# Patient Record
Sex: MALE | Race: WHITE | NOT HISPANIC OR LATINO | Employment: OTHER | URBAN - METROPOLITAN AREA
[De-identification: names, ages, dates, MRNs, and addresses within clinical notes are randomized per-mention and may not be internally consistent; named-entity substitution may affect disease eponyms.]

---

## 2024-09-04 ENCOUNTER — APPOINTMENT (OUTPATIENT)
Dept: CT IMAGING | Facility: CLINIC | Age: 69
DRG: 100 | End: 2024-09-04
Attending: EMERGENCY MEDICINE
Payer: MEDICARE

## 2024-09-04 ENCOUNTER — HOSPITAL ENCOUNTER (INPATIENT)
Facility: CLINIC | Age: 69
LOS: 12 days | Discharge: HOME | DRG: 100 | End: 2024-09-18
Attending: EMERGENCY MEDICINE | Admitting: HOSPITALIST
Payer: MEDICARE

## 2024-09-04 DIAGNOSIS — R41.0 CONFUSION: ICD-10-CM

## 2024-09-04 DIAGNOSIS — R41.82 ALTERED MENTAL STATUS, UNSPECIFIED ALTERED MENTAL STATUS TYPE: ICD-10-CM

## 2024-09-04 DIAGNOSIS — R56.9 SEIZURE (H): ICD-10-CM

## 2024-09-04 DIAGNOSIS — R46.2 BIZARRE BEHAVIOR: ICD-10-CM

## 2024-09-04 DIAGNOSIS — R41.9 NEUROCOGNITIVE DISORDER: Primary | Chronic | ICD-10-CM

## 2024-09-04 LAB
ALBUMIN SERPL BCG-MCNC: 4.4 G/DL (ref 3.5–5.2)
ALBUMIN UR-MCNC: NEGATIVE MG/DL
ALP SERPL-CCNC: 69 U/L (ref 40–150)
ALT SERPL W P-5'-P-CCNC: 23 U/L (ref 0–70)
ANION GAP SERPL CALCULATED.3IONS-SCNC: 13 MMOL/L (ref 7–15)
APPEARANCE UR: CLEAR
AST SERPL W P-5'-P-CCNC: 39 U/L (ref 0–45)
BASOPHILS # BLD AUTO: 0 10E3/UL (ref 0–0.2)
BASOPHILS NFR BLD AUTO: 0 %
BILIRUB SERPL-MCNC: 0.9 MG/DL
BILIRUB UR QL STRIP: NEGATIVE
BUN SERPL-MCNC: 10.9 MG/DL (ref 8–23)
CALCIUM SERPL-MCNC: 9.1 MG/DL (ref 8.8–10.4)
CHLORIDE SERPL-SCNC: 100 MMOL/L (ref 98–107)
COLOR UR AUTO: ABNORMAL
CREAT SERPL-MCNC: 0.9 MG/DL (ref 0.67–1.17)
EGFRCR SERPLBLD CKD-EPI 2021: >90 ML/MIN/1.73M2
EOSINOPHIL # BLD AUTO: 0 10E3/UL (ref 0–0.7)
EOSINOPHIL NFR BLD AUTO: 0 %
ERYTHROCYTE [DISTWIDTH] IN BLOOD BY AUTOMATED COUNT: 12.2 % (ref 10–15)
ETHANOL SERPL-MCNC: <0.01 G/DL
GLUCOSE SERPL-MCNC: 161 MG/DL (ref 70–99)
GLUCOSE UR STRIP-MCNC: NEGATIVE MG/DL
HCO3 SERPL-SCNC: 20 MMOL/L (ref 22–29)
HCT VFR BLD AUTO: 38.8 % (ref 40–53)
HGB BLD-MCNC: 13.9 G/DL (ref 13.3–17.7)
HGB UR QL STRIP: ABNORMAL
HOLD SPECIMEN: NORMAL
IMM GRANULOCYTES # BLD: 0.1 10E3/UL
IMM GRANULOCYTES NFR BLD: 1 %
KETONES UR STRIP-MCNC: ABNORMAL MG/DL
LEUKOCYTE ESTERASE UR QL STRIP: NEGATIVE
LYMPHOCYTES # BLD AUTO: 1.6 10E3/UL (ref 0.8–5.3)
LYMPHOCYTES NFR BLD AUTO: 21 %
MAGNESIUM SERPL-MCNC: 2.3 MG/DL (ref 1.7–2.3)
MCH RBC QN AUTO: 33.6 PG (ref 26.5–33)
MCHC RBC AUTO-ENTMCNC: 35.8 G/DL (ref 31.5–36.5)
MCV RBC AUTO: 94 FL (ref 78–100)
MONOCYTES # BLD AUTO: 0.4 10E3/UL (ref 0–1.3)
MONOCYTES NFR BLD AUTO: 6 %
NEUTROPHILS # BLD AUTO: 5.3 10E3/UL (ref 1.6–8.3)
NEUTROPHILS NFR BLD AUTO: 72 %
NITRATE UR QL: NEGATIVE
NRBC # BLD AUTO: 0 10E3/UL
NRBC BLD AUTO-RTO: 0 /100
PH UR STRIP: 6.5 [PH] (ref 5–7)
PLATELET # BLD AUTO: 137 10E3/UL (ref 150–450)
POTASSIUM SERPL-SCNC: 3.6 MMOL/L (ref 3.4–5.3)
PROT SERPL-MCNC: 7.4 G/DL (ref 6.4–8.3)
RBC # BLD AUTO: 4.14 10E6/UL (ref 4.4–5.9)
RBC URINE: 2 /HPF
SODIUM SERPL-SCNC: 133 MMOL/L (ref 135–145)
SP GR UR STRIP: 1.02 (ref 1–1.03)
UROBILINOGEN UR STRIP-MCNC: NORMAL MG/DL
WBC # BLD AUTO: 7.3 10E3/UL (ref 4–11)
WBC URINE: 1 /HPF

## 2024-09-04 PROCEDURE — 96360 HYDRATION IV INFUSION INIT: CPT

## 2024-09-04 PROCEDURE — 85025 COMPLETE CBC W/AUTO DIFF WBC: CPT | Performed by: EMERGENCY MEDICINE

## 2024-09-04 PROCEDURE — 80053 COMPREHEN METABOLIC PANEL: CPT | Performed by: EMERGENCY MEDICINE

## 2024-09-04 PROCEDURE — 36415 COLL VENOUS BLD VENIPUNCTURE: CPT | Performed by: EMERGENCY MEDICINE

## 2024-09-04 PROCEDURE — 82077 ASSAY SPEC XCP UR&BREATH IA: CPT | Performed by: EMERGENCY MEDICINE

## 2024-09-04 PROCEDURE — 250N000009 HC RX 250: Performed by: EMERGENCY MEDICINE

## 2024-09-04 PROCEDURE — 80307 DRUG TEST PRSMV CHEM ANLYZR: CPT | Performed by: EMERGENCY MEDICINE

## 2024-09-04 PROCEDURE — 81001 URINALYSIS AUTO W/SCOPE: CPT | Performed by: EMERGENCY MEDICINE

## 2024-09-04 PROCEDURE — 84443 ASSAY THYROID STIM HORMONE: CPT | Performed by: HOSPITALIST

## 2024-09-04 PROCEDURE — 83735 ASSAY OF MAGNESIUM: CPT | Performed by: EMERGENCY MEDICINE

## 2024-09-04 PROCEDURE — 99285 EMERGENCY DEPT VISIT HI MDM: CPT | Mod: 25

## 2024-09-04 PROCEDURE — 82607 VITAMIN B-12: CPT | Performed by: HOSPITALIST

## 2024-09-04 PROCEDURE — 258N000003 HC RX IP 258 OP 636: Performed by: EMERGENCY MEDICINE

## 2024-09-04 PROCEDURE — 70496 CT ANGIOGRAPHY HEAD: CPT | Mod: MA

## 2024-09-04 PROCEDURE — 70450 CT HEAD/BRAIN W/O DYE: CPT | Mod: MA

## 2024-09-04 PROCEDURE — 96361 HYDRATE IV INFUSION ADD-ON: CPT

## 2024-09-04 PROCEDURE — 250N000011 HC RX IP 250 OP 636: Performed by: EMERGENCY MEDICINE

## 2024-09-04 RX ORDER — IOPAMIDOL 755 MG/ML
67 INJECTION, SOLUTION INTRAVASCULAR ONCE
Status: COMPLETED | OUTPATIENT
Start: 2024-09-04 | End: 2024-09-04

## 2024-09-04 RX ADMIN — SODIUM CHLORIDE 1000 ML: 9 INJECTION, SOLUTION INTRAVENOUS at 21:24

## 2024-09-04 RX ADMIN — IOPAMIDOL 67 ML: 755 INJECTION, SOLUTION INTRAVENOUS at 22:06

## 2024-09-04 RX ADMIN — SODIUM CHLORIDE 100 ML: 9 INJECTION, SOLUTION INTRAVENOUS at 22:06

## 2024-09-04 ASSESSMENT — COLUMBIA-SUICIDE SEVERITY RATING SCALE - C-SSRS
2. HAVE YOU ACTUALLY HAD ANY THOUGHTS OF KILLING YOURSELF IN THE PAST MONTH?: NO
6. HAVE YOU EVER DONE ANYTHING, STARTED TO DO ANYTHING, OR PREPARED TO DO ANYTHING TO END YOUR LIFE?: NO
1. IN THE PAST MONTH, HAVE YOU WISHED YOU WERE DEAD OR WISHED YOU COULD GO TO SLEEP AND NOT WAKE UP?: NO

## 2024-09-04 ASSESSMENT — ACTIVITIES OF DAILY LIVING (ADL)
ADLS_ACUITY_SCORE: 35

## 2024-09-04 NOTE — LETTER
[facility] NEUROSCIENCE UNIT  [address] [place] [address]  Phone: [phone]    September 18, 2024      Ivan Webb  [facility] [place] [id number]      To Whom It May Concern:    Ivan Webb is under our care and was admitted to our hospital from 9/4/24 to 9/18/24.  I anticipate that he will be able to go take a flight back to [place] after discharging from Hospital.  I hope this information is sufficient for your needs.  If you have any additional questions regarding this matter please contact our office.  Thank you.      Sincerely,            Estiven Randall MD  Internal Medicine/ Hospitalist

## 2024-09-05 ENCOUNTER — APPOINTMENT (OUTPATIENT)
Dept: GENERAL RADIOLOGY | Facility: CLINIC | Age: 69
DRG: 100 | End: 2024-09-05
Attending: INTERNAL MEDICINE
Payer: MEDICARE

## 2024-09-05 ENCOUNTER — APPOINTMENT (OUTPATIENT)
Dept: MRI IMAGING | Facility: CLINIC | Age: 69
DRG: 100 | End: 2024-09-05
Attending: EMERGENCY MEDICINE
Payer: MEDICARE

## 2024-09-05 ENCOUNTER — HOSPITAL ENCOUNTER (OUTPATIENT)
Dept: NEUROLOGY | Facility: CLINIC | Age: 69
Setting detail: OBSERVATION
Discharge: HOME | DRG: 100 | End: 2024-09-05
Attending: HOSPITALIST
Payer: MEDICARE

## 2024-09-05 ENCOUNTER — HOSPITAL ENCOUNTER (OUTPATIENT)
Dept: NEUROLOGY | Facility: CLINIC | Age: 69
Setting detail: OBSERVATION
Discharge: HOME | DRG: 100 | End: 2024-09-05
Attending: PSYCHIATRY & NEUROLOGY
Payer: MEDICARE

## 2024-09-05 PROBLEM — R41.82 ALTERED MENTAL STATUS, UNSPECIFIED ALTERED MENTAL STATUS TYPE: Status: ACTIVE | Noted: 2024-09-05

## 2024-09-05 PROBLEM — R46.2 BIZARRE BEHAVIOR: Status: ACTIVE | Noted: 2024-09-05

## 2024-09-05 PROBLEM — R56.9 SEIZURE (H): Status: ACTIVE | Noted: 2024-09-05

## 2024-09-05 PROBLEM — R41.0 CONFUSION: Status: ACTIVE | Noted: 2024-09-05

## 2024-09-05 LAB
AMMONIA PLAS-SCNC: 13 UMOL/L (ref 16–60)
AMPHETAMINES UR QL SCN: NORMAL
ANION GAP SERPL CALCULATED.3IONS-SCNC: 11 MMOL/L (ref 7–15)
BARBITURATES UR QL SCN: NORMAL
BASOPHILS # BLD AUTO: 0 10E3/UL (ref 0–0.2)
BASOPHILS NFR BLD AUTO: 0 %
BENZODIAZ UR QL SCN: NORMAL
BUN SERPL-MCNC: 9.3 MG/DL (ref 8–23)
BZE UR QL SCN: NORMAL
CALCIUM SERPL-MCNC: 8.7 MG/DL (ref 8.8–10.4)
CANNABINOIDS UR QL SCN: NORMAL
CHLORIDE SERPL-SCNC: 100 MMOL/L (ref 98–107)
CREAT SERPL-MCNC: 0.77 MG/DL (ref 0.67–1.17)
EGFRCR SERPLBLD CKD-EPI 2021: >90 ML/MIN/1.73M2
EOSINOPHIL # BLD AUTO: 0 10E3/UL (ref 0–0.7)
EOSINOPHIL NFR BLD AUTO: 0 %
ERYTHROCYTE [DISTWIDTH] IN BLOOD BY AUTOMATED COUNT: 12.2 % (ref 10–15)
FENTANYL UR QL: NORMAL
GLUCOSE SERPL-MCNC: 107 MG/DL (ref 70–99)
HCO3 SERPL-SCNC: 25 MMOL/L (ref 22–29)
HCT VFR BLD AUTO: 38.1 % (ref 40–53)
HGB BLD-MCNC: 13.7 G/DL (ref 13.3–17.7)
IMM GRANULOCYTES # BLD: 0 10E3/UL
IMM GRANULOCYTES NFR BLD: 0 %
LYMPHOCYTES # BLD AUTO: 1.8 10E3/UL (ref 0.8–5.3)
LYMPHOCYTES NFR BLD AUTO: 25 %
MCH RBC QN AUTO: 33.7 PG (ref 26.5–33)
MCHC RBC AUTO-ENTMCNC: 36 G/DL (ref 31.5–36.5)
MCV RBC AUTO: 94 FL (ref 78–100)
MONOCYTES # BLD AUTO: 0.5 10E3/UL (ref 0–1.3)
MONOCYTES NFR BLD AUTO: 7 %
NEUTROPHILS # BLD AUTO: 4.8 10E3/UL (ref 1.6–8.3)
NEUTROPHILS NFR BLD AUTO: 67 %
NRBC # BLD AUTO: 0 10E3/UL
NRBC BLD AUTO-RTO: 0 /100
OPIATES UR QL SCN: NORMAL
PCP QUAL URINE (ROCHE): NORMAL
PLATELET # BLD AUTO: 118 10E3/UL (ref 150–450)
POTASSIUM SERPL-SCNC: 3.7 MMOL/L (ref 3.4–5.3)
RBC # BLD AUTO: 4.06 10E6/UL (ref 4.4–5.9)
SARS-COV-2 RNA RESP QL NAA+PROBE: POSITIVE
SODIUM SERPL-SCNC: 136 MMOL/L (ref 135–145)
TSH SERPL DL<=0.005 MIU/L-ACNC: 2.38 UIU/ML (ref 0.3–4.2)
VIT B12 SERPL-MCNC: 808 PG/ML (ref 232–1245)
WBC # BLD AUTO: 7.2 10E3/UL (ref 4–11)

## 2024-09-05 PROCEDURE — 250N000013 HC RX MED GY IP 250 OP 250 PS 637: Performed by: INTERNAL MEDICINE

## 2024-09-05 PROCEDURE — 99418 PROLNG IP/OBS E/M EA 15 MIN: CPT | Performed by: HOSPITALIST

## 2024-09-05 PROCEDURE — 250N000011 HC RX IP 250 OP 636: Performed by: PSYCHIATRY & NEUROLOGY

## 2024-09-05 PROCEDURE — 72080 X-RAY EXAM THORACOLMB 2/> VW: CPT

## 2024-09-05 PROCEDURE — 85048 AUTOMATED LEUKOCYTE COUNT: CPT | Performed by: HOSPITALIST

## 2024-09-05 PROCEDURE — 96374 THER/PROPH/DIAG INJ IV PUSH: CPT

## 2024-09-05 PROCEDURE — 999N000052 EEG VIDEO 12-26 HR UNMONITORED

## 2024-09-05 PROCEDURE — 36415 COLL VENOUS BLD VENIPUNCTURE: CPT | Performed by: HOSPITALIST

## 2024-09-05 PROCEDURE — 87635 SARS-COV-2 COVID-19 AMP PRB: CPT | Performed by: HOSPITALIST

## 2024-09-05 PROCEDURE — 95816 EEG AWAKE AND DROWSY: CPT

## 2024-09-05 PROCEDURE — 258N000003 HC RX IP 258 OP 636: Performed by: PSYCHIATRY & NEUROLOGY

## 2024-09-05 PROCEDURE — 70551 MRI BRAIN STEM W/O DYE: CPT | Mod: MA

## 2024-09-05 PROCEDURE — G0378 HOSPITAL OBSERVATION PER HR: HCPCS

## 2024-09-05 PROCEDURE — 82140 ASSAY OF AMMONIA: CPT | Performed by: EMERGENCY MEDICINE

## 2024-09-05 PROCEDURE — 99223 1ST HOSP IP/OBS HIGH 75: CPT | Mod: AI | Performed by: HOSPITALIST

## 2024-09-05 PROCEDURE — 250N000013 HC RX MED GY IP 250 OP 250 PS 637: Performed by: PSYCHIATRY & NEUROLOGY

## 2024-09-05 PROCEDURE — 250N000013 HC RX MED GY IP 250 OP 250 PS 637: Performed by: HOSPITALIST

## 2024-09-05 PROCEDURE — 36415 COLL VENOUS BLD VENIPUNCTURE: CPT | Performed by: EMERGENCY MEDICINE

## 2024-09-05 PROCEDURE — 80048 BASIC METABOLIC PNL TOTAL CA: CPT | Performed by: HOSPITALIST

## 2024-09-05 PROCEDURE — 99207 PR APP CREDIT; MD BILLING SHARED VISIT: CPT | Performed by: INTERNAL MEDICINE

## 2024-09-05 RX ORDER — LORAZEPAM 2 MG/ML
2 INJECTION INTRAMUSCULAR
Status: DISCONTINUED | OUTPATIENT
Start: 2024-09-05 | End: 2024-09-18 | Stop reason: HOSPADM

## 2024-09-05 RX ORDER — AMOXICILLIN 250 MG
2 CAPSULE ORAL 2 TIMES DAILY PRN
Status: DISCONTINUED | OUTPATIENT
Start: 2024-09-05 | End: 2024-09-18 | Stop reason: HOSPADM

## 2024-09-05 RX ORDER — BUPROPION HYDROCHLORIDE 150 MG/1
150 TABLET ORAL EVERY MORNING
Status: DISCONTINUED | OUTPATIENT
Start: 2024-09-05 | End: 2024-09-16

## 2024-09-05 RX ORDER — FOLIC ACID/MV,IRON,MIN/LUTEIN 0.4-18-25
1 TABLET ORAL DAILY
Status: ON HOLD | COMMUNITY
End: 2024-09-17

## 2024-09-05 RX ORDER — LEVETIRACETAM 750 MG/1
750 TABLET ORAL 2 TIMES DAILY
Status: DISCONTINUED | OUTPATIENT
Start: 2024-09-05 | End: 2024-09-06

## 2024-09-05 RX ORDER — FOLIC ACID 1 MG/1
1 TABLET ORAL DAILY
Status: DISCONTINUED | OUTPATIENT
Start: 2024-09-05 | End: 2024-09-18 | Stop reason: HOSPADM

## 2024-09-05 RX ORDER — FOLIC ACID 1 MG/1
1 TABLET ORAL DAILY
Status: ON HOLD | COMMUNITY
End: 2024-09-17

## 2024-09-05 RX ORDER — LIDOCAINE 4 G/G
2 PATCH TOPICAL
Status: DISCONTINUED | OUTPATIENT
Start: 2024-09-05 | End: 2024-09-18 | Stop reason: HOSPADM

## 2024-09-05 RX ORDER — ATORVASTATIN CALCIUM 80 MG/1
80 TABLET, FILM COATED ORAL DAILY
COMMUNITY

## 2024-09-05 RX ORDER — MAGNESIUM OXIDE 400 MG/1
800 TABLET ORAL ONCE
Status: COMPLETED | OUTPATIENT
Start: 2024-09-05 | End: 2024-09-05

## 2024-09-05 RX ORDER — LANOLIN ALCOHOL/MO/W.PET/CERES
100 CREAM (GRAM) TOPICAL DAILY
Status: ON HOLD | COMMUNITY
End: 2024-09-17

## 2024-09-05 RX ORDER — MULTIPLE VITAMINS W/ MINERALS TAB 9MG-400MCG
1 TAB ORAL DAILY
Status: DISCONTINUED | OUTPATIENT
Start: 2024-09-05 | End: 2024-09-18 | Stop reason: HOSPADM

## 2024-09-05 RX ORDER — MULTIVITAMIN,THERAPEUTIC
1 TABLET ORAL ONCE
Status: COMPLETED | OUTPATIENT
Start: 2024-09-05 | End: 2024-09-05

## 2024-09-05 RX ORDER — AMOXICILLIN 250 MG
1 CAPSULE ORAL 2 TIMES DAILY PRN
Status: DISCONTINUED | OUTPATIENT
Start: 2024-09-05 | End: 2024-09-18 | Stop reason: HOSPADM

## 2024-09-05 RX ORDER — ONDANSETRON 2 MG/ML
4 INJECTION INTRAMUSCULAR; INTRAVENOUS EVERY 6 HOURS PRN
Status: DISCONTINUED | OUTPATIENT
Start: 2024-09-05 | End: 2024-09-18 | Stop reason: HOSPADM

## 2024-09-05 RX ORDER — FOLIC ACID 1 MG/1
1 TABLET ORAL ONCE
Status: COMPLETED | OUTPATIENT
Start: 2024-09-05 | End: 2024-09-05

## 2024-09-05 RX ORDER — BUPROPION HYDROCHLORIDE 150 MG/1
150 TABLET ORAL EVERY MORNING
Status: ON HOLD | COMMUNITY
End: 2024-09-17

## 2024-09-05 RX ORDER — ACETAMINOPHEN 650 MG/1
650 SUPPOSITORY RECTAL EVERY 4 HOURS PRN
Status: DISCONTINUED | OUTPATIENT
Start: 2024-09-05 | End: 2024-09-18 | Stop reason: HOSPADM

## 2024-09-05 RX ORDER — ONDANSETRON 4 MG/1
4 TABLET, ORALLY DISINTEGRATING ORAL EVERY 6 HOURS PRN
Status: DISCONTINUED | OUTPATIENT
Start: 2024-09-05 | End: 2024-09-18 | Stop reason: HOSPADM

## 2024-09-05 RX ORDER — ACETAMINOPHEN 325 MG/1
650 TABLET ORAL EVERY 4 HOURS PRN
Status: DISCONTINUED | OUTPATIENT
Start: 2024-09-05 | End: 2024-09-18 | Stop reason: HOSPADM

## 2024-09-05 RX ORDER — LANOLIN ALCOHOL/MO/W.PET/CERES
3 CREAM (GRAM) TOPICAL
Status: DISCONTINUED | OUTPATIENT
Start: 2024-09-05 | End: 2024-09-18 | Stop reason: HOSPADM

## 2024-09-05 RX ORDER — ATORVASTATIN CALCIUM 80 MG/1
80 TABLET, FILM COATED ORAL DAILY
Status: DISCONTINUED | OUTPATIENT
Start: 2024-09-05 | End: 2024-09-18 | Stop reason: HOSPADM

## 2024-09-05 RX ADMIN — THIAMINE HCL TAB 100 MG 100 MG: 100 TAB at 03:03

## 2024-09-05 RX ADMIN — FOLIC ACID 1 MG: 1 TABLET ORAL at 03:03

## 2024-09-05 RX ADMIN — BENZOCAINE 6 MG-MENTHOL 10 MG LOZENGES 1 LOZENGE: at 03:16

## 2024-09-05 RX ADMIN — FOLIC ACID 1 MG: 1 TABLET ORAL at 15:59

## 2024-09-05 RX ADMIN — LIDOCAINE 2 PATCH: 560 PATCH PERCUTANEOUS; TOPICAL; TRANSDERMAL at 20:49

## 2024-09-05 RX ADMIN — LEVETIRACETAM 750 MG: 750 TABLET, FILM COATED ORAL at 20:48

## 2024-09-05 RX ADMIN — Medication 800 MG: at 03:03

## 2024-09-05 RX ADMIN — MULTIVITAMIN TABLET 1 TABLET: TABLET at 03:03

## 2024-09-05 RX ADMIN — LEVETIRACETAM 2000 MG: 100 INJECTION, SOLUTION INTRAVENOUS at 19:02

## 2024-09-05 RX ADMIN — ATORVASTATIN CALCIUM 80 MG: 80 TABLET, FILM COATED ORAL at 15:59

## 2024-09-05 RX ADMIN — THIAMINE HCL TAB 100 MG 100 MG: 100 TAB at 15:59

## 2024-09-05 RX ADMIN — ACETAMINOPHEN 650 MG: 325 TABLET ORAL at 09:57

## 2024-09-05 RX ADMIN — Medication 1 TABLET: at 15:59

## 2024-09-05 ASSESSMENT — ACTIVITIES OF DAILY LIVING (ADL)
ADLS_ACUITY_SCORE: 35
ADLS_ACUITY_SCORE: 45
ADLS_ACUITY_SCORE: 45
ADLS_ACUITY_SCORE: 35
ADLS_ACUITY_SCORE: 45
ADLS_ACUITY_SCORE: 35
ADLS_ACUITY_SCORE: 45
ADLS_ACUITY_SCORE: 44
ADLS_ACUITY_SCORE: 35
ADLS_ACUITY_SCORE: 45
ADLS_ACUITY_SCORE: 44
ADLS_ACUITY_SCORE: 35
ADLS_ACUITY_SCORE: 45
ADLS_ACUITY_SCORE: 35
ADLS_ACUITY_SCORE: 45
ADLS_ACUITY_SCORE: 35
ADLS_ACUITY_SCORE: 45
ADLS_ACUITY_SCORE: 35

## 2024-09-05 NOTE — PROGRESS NOTES
RECEIVING UNIT ED HANDOFF REVIEW    ED Nurse Handoff Report was reviewed by: Nikolay Cohen RN on September 5, 2024 at 9:13 AM

## 2024-09-05 NOTE — PROGRESS NOTES
Patient admitted early hours of this morning.  See excellent H&P by Dr. Gore    Patient seen and examined along with neurologist  History reviewed    Better but intermittent confusion as per wife    Recently had a long 3300 mile trip to [place] which he remembers but complains of low back pain  Mild cough since Saturday      Breathing comfortable  No tachypnea  Vital stable, no use of O2    Labs and imaging reviewed    A/P: Continue management as noted in H&P  -Discussed with neurologist and will repeat EEG  -Continue to treat toxic metabolic causes  - HOLD Bupropion as lowers seizure threshold    15-minute prolonged care

## 2024-09-05 NOTE — PROGRESS NOTES
Preliminary review of long-term EEG recording.      EEG started this afternoon around 3:30 PM.  Normal for the first 1-1/2 hours.  Around 4:50 PM, patient started developing rhythmic bifrontal sharp wave discharges with centralization around the right F4/C4 electrodes.  These quickly spread to bifrontal regions and likely represent right more than left bilateral periodic lateralized epileptiform discharges.  Could represent an epileptiform phenomena in the right clinical setting.  This activity goes on till about 5:20 PM and normalizes thereafter until about 538 when this note was written.      I called Dr. Shar Feliz, neurologist on-call to give him this report.  Will review a longer-term recording later tonight.    Isauro Jaime MD   Neurologist, [facility]   September 5, 2024 5:39 PM

## 2024-09-05 NOTE — H&P
[facility]    History and Physical  Hospitalist       Date of Admission:  9/4/2024  Date of Service (when I saw the patient): 09/05/24    ASSESSMENT  Ivan Webb is a markedly pleasant 69 year old gentleman with past medical history that is most notable for prior seizure, who presents with acute confusion and convulsive syncope. and is found to have suspected acute seizure and acute metabolic encephalopathy.    PLAN     Suspected acute seizure and acute metabolic encephalopathy: Of note, Mr. Webb is travelling today back to his home in [place]. He recently quit smoking several weeks ago. It seems that he was hospitalized there in 2020 for acute confusion and bizarre behavior. It was documented that he was drinking 4-6 alcoholic beverages per day at that time. While hospitalized he had a seizure. Alcohol withdrawal and/or wernicke korsakoff syndrome were suspected. No further seizures have been known to have occurred before or since.    Now, he presents for evaluation of acute confusion today, culminating in a witnessed episode of convulsive syncope at the airport. In the ED, he is afebrile, without hypotension, tachycardia, or hypoxia. WBC is normal, as is hepatic panel. He has mild acute metabolic acidosis and thrombocytopenia as discussed below. UA as well as UDS are negative and Ethyl alcohol level is undetectable. CT and CTA of head and neck show no acute processes.     Overall, his symptoms seem consistent with a seizure. He could have alcohol withdrawal or a post-viral process. We will rule out stroke or structural abnormalities of the brain. His ongoing encephalopathy could be due to post-ictal confusion. Alternatively he could have as yet undiagnosed dementia, perhaps Wernicke encephalopathy. Epilepsy also remains on the differential.       -- Observation. Fall and seizure precautions. Q 4 neuro checks. MRI Brain and EEG ordered. Neurology consulted.    -- TSH, B12 and  Ammonia level ordered. COVID PCR ordered    -- Monitor closely for signs of withdrawal. Thiamine, folate and MVI ordered    -- Repeat CBC and BMP in AM. SW consulted for disposition planning.    Acute metabolic acidosis: Suspect due to hypovolemia: Given IV fluid in the ED. Repeat BMP in AM    Thrombocytopenia, acute: Mild. Had been normal on 6/17/24. Could be due to acute illness. Repeat CBC in AM.    Hyperlipidemia: Resume home Lipitor when verified    Chronic depression: On Wellbutrin which can lower the seizure threshold. Neurology to assess further today prior to resumption    History of prostate cancer: Status post prostactectomy. Noted    I have spent 75 minutes on the date of service doing chart review, history, examination, documentation, and further activities per the note.    Chief Complaint   Confusion    History is obtained from the patient, his wife at the bedside, and the ED physician whom I have spoken with    History of Present Illness   Ivan Webb is a markedly pleasant 69 year old gentleman who presents with confusion. His wife is with him and notes that they are from [place], and have recently been on a bus tour of the [facility] (neither one of them have been driving). This morning when they woke up at the hotel they were at, she noted him to be very acutely confused, doing things such as putting his bags outside the elevator door and leaving them there, not aware of where he was. They got to the airport here to fly back and his confusion worsened, especially as they were trying to get through security. At one point he tossed his 's license up in the air. Then as they were in line to board the plane, he fell backwards onto his backpack, thereby avoiding striking his head; he developed loss of consciousness and had full body convulsions for a period of several minutes, at least as far as can be ascertained by EMS and his wife. When he came to, he was even more confused. He  "was brought in for further evaluation. He is currently disoriented to place and time and has trouble finding words to say at times. He appears easily distracted. He is unable to recall fully the events of the day. His wife says that they both have had recent cold symptoms with fever and head congestion which have improved. When I ask about ETOH use, she says \"he didn't have that much today\", perhaps two drinks today, with lunch and while at the airport. When I ask how much he normally drinks, he says \"a lot. And that's the end of the story\"; his wife then regards him with a quizzical look. He has some mild pain in the middle of the back after his fall today. He otherwise denies headache, nausea, diarrhea, dysuria, dyspnea, or other acute complaints.    In the ED,   09/04 2100 125/88 98.3  F (36.8  C) 82 18 95 %     CBC and CMP were notable for , Na 133, CO2 20, Glucose 161, otherwise were within the normal reference range. WBC was 7.3. UA shows 1 WBC, 2 RBC. Ethyl alcohol level and UDS were negative.    He was given IV fluid in the ED.    Recent Results (from the past 24 hour(s))   CT Head w/o Contrast    Narrative    EXAM: CT HEAD W/O CONTRAST  LOCATION: [facility]  DATE: 9/4/2024    INDICATION: seizure, fall, confusion  COMPARISON: None.  TECHNIQUE: Routine CT Head without IV contrast. Multiplanar reformats. Dose reduction techniques were used.    FINDINGS:  INTRACRANIAL CONTENTS: No intracranial hemorrhage, extraaxial collection, or mass effect.  No CT evidence of acute infarct. Mild presumed chronic small vessel ischemic changes. Mild generalized volume loss. No hydrocephalus.     VISUALIZED ORBITS/SINUSES/MASTOIDS: No intraorbital abnormality. Minimal amount of fluid left maxillary sinus. No middle ear or mastoid effusion.    BONES/SOFT TISSUES: No acute abnormality.      Impression    IMPRESSION:  1.  No acute intracranial process.   CTA Head Neck w Contrast    Narrative    " EXAM: CTA HEAD NECK W CONTRAST  LOCATION: [facility]  DATE: 9/4/2024    INDICATION: seizure, fall, confusion  COMPARISON: None.  CONTRAST: 67mL Isovue 370  TECHNIQUE: Head and neck CT angiogram with IV contrast. Axial helical CT images of the head and neck vessels obtained during the arterial phase of intravenous contrast administration. Axial 2D reconstructed images and multiplanar 3D MIP reconstructed   images of the head and neck vessels were performed by the technologist. Dose reduction techniques were used. All stenosis measurements made according to NASCET criteria unless otherwise specified.    FINDINGS:   HEAD CTA:  ANTERIOR CIRCULATION: Mild atherosclerotic changes cavernous and supraclinoid ICAs bilaterally. Standard [place] anatomy.    POSTERIOR CIRCULATION: No stenosis/occlusion, aneurysm, or high flow vascular malformation. Dominant left and smaller right vertebral artery contribute to a normal basilar artery.     DURAL VENOUS SINUSES: Expected enhancement of the major dural venous sinuses.    NECK CTA:  RIGHT CAROTID: Less than 50% narrowing.    LEFT CAROTID: Less than 50% narrowing.    VERTEBRAL ARTERIES: Mild narrowing at the origin of both vertebral arteries. Both vertebral arteries appear otherwise normal. Dominant left and smaller right vertebral arteries.    AORTIC ARCH: Moderate narrowing at origin/proximal aspect of both subclavian arteries.    NONVASCULAR STRUCTURES: Mild compression of T3 and moderate compression of T4, age indeterminate; appear chronic.      Impression    IMPRESSION:     HEAD CTA:   1.  Mild atherosclerotic changes cavernous and supraclinoid ICAs bilaterally.  2.  Intracranial circulation appears otherwise normal.    NECK CTA:  1.  No definite hemodynamically significant narrowing throughout major neck vessels.         PHYSICAL EXAM  Blood pressure 125/88, pulse 82, temperature 98.3  F (36.8  C), temperature source Oral, resp. rate 18,  "height 1.676 m (5' 6\"), weight 72.6 kg (160 lb), SpO2 95%.  Constitutional: Alert and oriented to person only; no apparent distress  Respiratory: lungs clear to auscultation bilaterally  Cardiovascular: regular S1 S2  GI: abdomen soft non tender non distended bowel sounds positive  Musculoskeletal: no clubbing, cyanosis or edema  Neurologic: extra-ocular muscles intact; moves all four extremities; no nystagmus     DVT Prophylaxis: Pneumatic Compression Devices  Code Status: Full Code    Medically Ready for Discharge: Anticipated Today       Nikolay Gore MD, MD    Past Medical History    I have reviewed this patient's medical history and updated it with pertinent information if needed.   Past Medical History:   Diagnosis Date    Depression     History of colonic polyps     History of diverticulitis     HTN (hypertension)     Hyperlipidemia     Nephrolithiasis     Prostate cancer (H)     Pulmonary nodules     Seizure (H) 2020    Thrombosed external hemorrhoids        Past Surgical History   I have reviewed this patient's surgical history and updated it with pertinent information if needed.  Past Surgical History:   Procedure Laterality Date    COLONOSCOPY      PROSTATECTOMY         Prior to Admission Medications     Need to be reconciled but seem to include:    1) Lipitor  2) Wellbutrin  3) Aspirin  4) Thiamine  5) Folate  6) MVI    Among others    Allergies   No Known Allergies    Social History   I have reviewed this patient's social history and updated it with pertinent information if needed. Ivan Webb      Family History   Family history assessed and, except as above, is non-contributory.    No family history on file.    Review of Systems   The 10 point Review of Systems is negative other than noted in the HPI or here.     Primary Care Physician   Physician No Ref-Primary    Data   Labs Ordered and Resulted from Time of ED Arrival to Time of ED Departure   COMPREHENSIVE METABOLIC PANEL - Abnormal "       Result Value    Sodium 133 (*)     Potassium 3.6      Carbon Dioxide (CO2) 20 (*)     Anion Gap 13      Urea Nitrogen 10.9      Creatinine 0.90      GFR Estimate >90      Calcium 9.1      Chloride 100      Glucose 161 (*)     Alkaline Phosphatase 69      AST 39      ALT 23      Protein Total 7.4      Albumin 4.4      Bilirubin Total 0.9     ROUTINE UA WITH MICROSCOPIC REFLEX TO CULTURE - Abnormal    Color Urine Straw      Appearance Urine Clear      Glucose Urine Negative      Bilirubin Urine Negative      Ketones Urine Trace (*)     Specific Gravity Urine 1.019      Blood Urine Small (*)     pH Urine 6.5      Protein Albumin Urine Negative      Urobilinogen Urine Normal      Nitrite Urine Negative      Leukocyte Esterase Urine Negative      RBC Urine 2      WBC Urine 1     CBC WITH PLATELETS AND DIFFERENTIAL - Abnormal    WBC Count 7.3      RBC Count 4.14 (*)     Hemoglobin 13.9      Hematocrit 38.8 (*)     MCV 94      MCH 33.6 (*)     MCHC 35.8      RDW 12.2      Platelet Count 137 (*)     % Neutrophils 72      % Lymphocytes 21      % Monocytes 6      % Eosinophils 0      % Basophils 0      % Immature Granulocytes 1      NRBCs per 100 WBC 0      Absolute Neutrophils 5.3      Absolute Lymphocytes 1.6      Absolute Monocytes 0.4      Absolute Eosinophils 0.0      Absolute Basophils 0.0      Absolute Immature Granulocytes 0.1      Absolute NRBCs 0.0     MAGNESIUM - Normal    Magnesium 2.3     ETHYL ALCOHOL LEVEL - Normal    Alcohol ethyl <0.01     URINE DRUG SCREEN PANEL - Normal    Amphetamines Urine Screen Negative      Barbituates Urine Screen Negative      Benzodiazepine Urine Screen Negative      Cannabinoids Urine Screen Negative      Cocaine Urine Screen Negative      Fentanyl Qual Urine Screen Negative      Opiates Urine Screen Negative      PCP Urine Screen Negative     AMMONIA       Data reviewed today:  I personally reviewed the head CT image(s) showing no acute pathology .

## 2024-09-05 NOTE — PROGRESS NOTES
Brief Note    Full consult note to follow, but alert by EEG that patient was having frequent intermittent epileptiform activity, consistent with PLEDS, around 5PM.  The tracing has since normalized, so no need to administer Ativan.  But will load with Keppra.      Shar Basilio MD (general neurology)  Pgr [phone]

## 2024-09-05 NOTE — ED PROVIDER NOTES
"  Emergency Department Note      History of Present Illness     Chief Complaint   Seizures    HPI   Ivan Webb is a 69 year old male presenting to the ED for evaluation of seizures. The patient's wife reports that they were in line at the airport when her  tensed up and fell backwards. He did not hit his head because he landed flat on the backpack he was wearing. She witnessed tonic clonic activity for an unknown amount of time, and since then, the patient has been exhibiting a foggy memory. She adds that since 1000 this morning, the patient has seemed disoriented and is \"not comprehending what he is supposed to be doing\". No speech changes or word finding difficulties before or after the seizure. Upon FD arrival, the patient's oxygen saturation was in the mid 80s on room air. The patient reportedly had a known seizure 20 years ago, but does not take any anti-seizure medications.  He adds that he had one beer and one gin and tonic last night, but no alcohol today. The patient and his wife were just on a 12 day tour of the [employer], and were connecting in [place] on their way back home to [place], [place].     Independent Historian   Wife as detailed above.    Review of External Notes   I reviewed the pulmonology office visit from 7/1/24.   Reviewed old records from 2021 patient was admitted at [facility] with similar symptoms    Past Medical History     Medical History and Problem List   Seizure  Prostate cancer  Sever major depression with psychotic features  Diverticulitis  Hyponatremia  Kidney stones  Mental disorder  Hemorrhoids  Thrombosed external hemorrhoid   Tobacco abuse  Hyperlipidemia  Colon polyps  Hypertension  Diverticular disease of colon  Lung nodule     Medications   Lipitor   Bupropion  Thiamine  Folic acid   Certavite     Surgical History   Colonoscopy  Prostatectomy     Physical Exam     Patient Vitals for the past 24 hrs:   BP Temp Temp src Pulse Resp SpO2 Height " "Weight   09/04/24 2100 125/88 98.3  F (36.8  C) Oral 82 18 95 % 1.676 m (5' 6\") 72.6 kg (160 lb)     Physical Exam  General: Patient is alert and normal appearing.  Confusion, difficulty with memory   HEENT: Head atraumatic    Eyes: pupils equal and reactive. Conjunctiva clear   Nares: patent   Oropharynx: no lesions, uvula midline, no palatal draping, normal voice, no trismus  Neck: Supple without lymphadenopathy, no meningismus  Chest: Heart regular rate and rhythm.   Lungs: Equal clear to auscultation with no wheeze or rales  Abdomen: Soft, non tender, nondistended, normal bowel sounds  Back: No costovertebral angle tenderness, no midline C, T or L spine tenderness  Neuro: Grossly nonfocal, normal speech, strength equal bilaterally, CN 2-12 intact  Extremities: No deformities, equal radial and DP pulses. No clubbing, cyanosis.  No edema  Skin: Warm and dry with no rash.       Diagnostics     Lab Results   Labs Ordered and Resulted from Time of ED Arrival to Time of ED Departure   COMPREHENSIVE METABOLIC PANEL - Abnormal       Result Value    Sodium 133 (*)     Potassium 3.6      Carbon Dioxide (CO2) 20 (*)     Anion Gap 13      Urea Nitrogen 10.9      Creatinine 0.90      GFR Estimate >90      Calcium 9.1      Chloride 100      Glucose 161 (*)     Alkaline Phosphatase 69      AST 39      ALT 23      Protein Total 7.4      Albumin 4.4      Bilirubin Total 0.9     ROUTINE UA WITH MICROSCOPIC REFLEX TO CULTURE - Abnormal    Color Urine Straw      Appearance Urine Clear      Glucose Urine Negative      Bilirubin Urine Negative      Ketones Urine Trace (*)     Specific Gravity Urine 1.019      Blood Urine Small (*)     pH Urine 6.5      Protein Albumin Urine Negative      Urobilinogen Urine Normal      Nitrite Urine Negative      Leukocyte Esterase Urine Negative      RBC Urine 2      WBC Urine 1     CBC WITH PLATELETS AND DIFFERENTIAL - Abnormal    WBC Count 7.3      RBC Count 4.14 (*)     Hemoglobin 13.9      " Hematocrit 38.8 (*)     MCV 94      MCH 33.6 (*)     MCHC 35.8      RDW 12.2      Platelet Count 137 (*)     % Neutrophils 72      % Lymphocytes 21      % Monocytes 6      % Eosinophils 0      % Basophils 0      % Immature Granulocytes 1      NRBCs per 100 WBC 0      Absolute Neutrophils 5.3      Absolute Lymphocytes 1.6      Absolute Monocytes 0.4      Absolute Eosinophils 0.0      Absolute Basophils 0.0      Absolute Immature Granulocytes 0.1      Absolute NRBCs 0.0     MAGNESIUM - Normal    Magnesium 2.3     ETHYL ALCOHOL LEVEL - Normal    Alcohol ethyl <0.01     URINE DRUG SCREEN PANEL   AMMONIA     Imaging   CTA Head Neck w Contrast   Final Result   IMPRESSION:       HEAD CTA:    1.  Mild atherosclerotic changes cavernous and supraclinoid ICAs bilaterally.   2.  Intracranial circulation appears otherwise normal.      NECK CTA:   1.  No definite hemodynamically significant narrowing throughout major neck vessels.         CT Head w/o Contrast   Final Result   IMPRESSION:   1.  No acute intracranial process.      MR Brain w/o Contrast    (Results Pending)   Report per radiology.     Independent Interpretation   CT Head: No intracranial hemorrhage or midline shift.    ED Course      Medications Administered   Medications   sodium chloride 0.9% BOLUS 1,000 mL (1,000 mLs Intravenous $New Bag 9/4/24 2124)   iopamidol (ISOVUE-370) solution 67 mL (67 mLs Intravenous $Given 9/4/24 2206)   sodium chloride 0.9 % bag 500 mL for CT scan flush use (100 mLs Intravenous $Given 9/4/24 2206)       Discussion of Management   Admitting Hospitalist, Dr. Gore. 12:10    ED Course   ED Course as of 09/04/24 2246   Wed Sep 04, 2024   2102 I obtained history and examined the patient as noted above.     0000 evaluated the patient.  Improved mentation however continues to have some bizarre behavior and confusion per his wife in the room.  Repeating questions.  Agree with plan for admission and updated on the findings and  plan  Additional Documentation  None    Medical Decision Making / Diagnosis     CMS Diagnoses: None    MIPS       None    MDM   Ivan Webb is a 69 year old male who presents emergency department following a seizure at the airport while waiting to check in.  Per the wife who is here with the patient they have been on a 2-week trip to the [employer] out [place].  Starting last night patient began to mill about and be very active and not getting sleep, this morning was acting more confused with some bizarre behavior for example getting off the elevator and going to a different elevator instead of walking to the door to leave the hotel.  While waiting in line at the airport patient was having trouble using his phone and locating his boarding pass which would be unusual for him and he fell back and had a seizure.  He landed on his backpack and did not hit his head.  Mildly postictal on arrival.  Reportedly unresponsive for 5 minutes.  On arrival patient is afebrile and hemodynamically stable.  He and his wife have had colds for the last 2 days but no fevers or chills.  Denies headache.  Denies any tick bites or rashes.  No new medication changes.  Patient has a history of alcohol use and previous admission 4 years ago with similar symptoms.  He does take thiamine and folic acid as prescribed.  Did drink a beer and a gin and tonic last night per his wife.    No signs of infectious etiology.  Afebrile and hemodynamically stable.  No significant leukocytosis.  No headache or meningismus on examination.    In the emergency department undertaken as noted above.  No evidence of alcohol withdrawal syndrome.  Alcohol level is negative.  Head CT with no acute intracranial hemorrhage or evidence of stroke.  CTA within acceptable limits with no large vessel occlusion.  Labs relatively unremarkable except for mild hyponatremia which she has had previously per records stating that he has hyponatremia.  Plan for MRI.  Patient  would not be a tPA candidate as he is outside of 4 hour window as well as no large vessel occlusion for thrombectomy.  Will plan to get MRI to rule out small lesions or CVA.  Plan to admit for continued observation, EEG and possible neurology consultation.  Patient is agreeable with this plan and all questions and concerns addressed.    Disposition   The patient was admitted to the hospital.     Diagnosis     ICD-10-CM    1. Altered mental status, unspecified altered mental status type  R41.82       2. Seizure (H)  R56.9       3. Confusion  R41.0       4. Bizarre behavior  R46.2            Discharge Medications   New Prescriptions    No medications on file     Scribe Disclosure:  I, Concha Bueno, am serving as a scribe at 9:02 PM on 9/4/2024 to document services personally performed by Yohana Augustine MD based on my observations and the provider's statements to me.        Yohana Augustine MD  09/05/24 0025

## 2024-09-05 NOTE — PLAN OF CARE
Goal Outcome Evaluation:  Plan of Care Reviewed With: patient, spouse    Overall Patient Progress: no changeOverall Patient Progress: no change    ED transferred 0930. Pt here with seizure like episode, acute encephalopathy. Covid +. Pt A&O x3, confused to situation. VSS, on RA. LS clear, infrequent dry cough. CMS and Neuro's intact except for confusion, and intermittent WFD like episodes. C/o lower back pain, Tylenol given and warm pack applied, relief. Xray of thoracic lumbar ordered. 24hr EEG ordered. A1 w/ GB to bathroom. Voiding adequately. +BS, last BM yesterday. Reg diet. Takes pills whole with water. Pt scoring green on Aggression Stop Light Tool. Seizure precautions maintained. Hosp/Gen Neuro following. Spouse, Laureen, at bedside. Discharge pending.

## 2024-09-05 NOTE — CONSULTS
"HOSPITAL NEUROLOGY    History of the Present Illness:    69 year old male presented to the ED 9/4/2024 after an apparent seizure with lingering disorientation (and behavior was strange leading up to the seizure as well).  Notes reported in chart suggest he was also hospitalized in 2020 for bizarre behavior and had a seizure.  Given that he was drinking 4-6 alcoholic drinks per day, these were thought to represent alcohol withdrawal seizures possibly.  Notes from this morning suggests not very forthcoming about alcohol intake, quoted as \"a lot, and that's the end of the story.\"  He was found to test positive for COVID.      Wife says that his behavior has been increasingly bizarre compared to yesterday and over the course of the day.  She does note that he was drinking more heavily but in the last week has cut back considerably.  Patient is disoriented and cannot give much in the way of history.      Otherwise, no focal weakness, numbness, vision changes.  No tongue biting or incontinence.        Past Medical History:   Diagnosis Date    Depression     History of colonic polyps     History of diverticulitis     HTN (hypertension)     Hyperlipidemia     Nephrolithiasis     Prostate cancer (H)     Pulmonary nodules     Seizure (H) 2020    Thrombosed external hemorrhoids         Past Surgical History:   Procedure Laterality Date    COLONOSCOPY      PROSTATECTOMY        Social History     Tobacco Use    Smoking status: Former     Types: Cigarettes   Substance Use Topics    Alcohol use: Yes        Family History   Problem Relation Age of Onset    Cancer Mother     Cancer Father           Current Facility-Administered Medications:     acetaminophen (TYLENOL) tablet 650 mg, 650 mg, Oral, Q4H PRN, 650 mg at 09/05/24 0957 **OR** acetaminophen (TYLENOL) Suppository 650 mg, 650 mg, Rectal, Q4H PRN, Nikolay Gore MD    benzocaine-menthol (CHLORASEPTIC) 6-10 MG lozenge 1 lozenge, 1 lozenge, Buccal, Q1H PRN, Sofía, " "Nikolay Scott MD, 1 lozenge at 24 0316    LORazepam (ATIVAN) injection 2 mg, 2 mg, Intravenous, Q3 Min PRN, Nikolay Gore MD    melatonin tablet 3 mg, 3 mg, Oral, At Bedtime PRN, Nikolay Gore MD    ondansetron (ZOFRAN ODT) ODT tab 4 mg, 4 mg, Oral, Q6H PRN **OR** ondansetron (ZOFRAN) injection 4 mg, 4 mg, Intravenous, Q6H PRN, Nikolay Gore MD    senna-docusate (SENOKOT-S/PERICOLACE) 8.6-50 MG per tablet 1 tablet, 1 tablet, Oral, BID PRN **OR** senna-docusate (SENOKOT-S/PERICOLACE) 8.6-50 MG per tablet 2 tablet, 2 tablet, Oral, BID PRN, Nikolay Gore MD    Allergies:  No Known Allergies    Physical Examination:     BP (!) 153/91   Pulse 72   Temp 98.4  F (36.9  C) (Oral)   Resp 29   Ht 1.727 m (5' 8\")   Wt 72.6 kg (160 lb)   SpO2 96%   BMI 24.33 kg/m      Body mass index is 24.33 kg/m .        NEUROLOGICAL:   MENTAL STATUS:   Alert but disoriented    CN:   II: Visual fields intact. PERRLA.   III, IV, VI: EOMI.    V: Symmetric facial sensation to light touch.   VII: Face symmetric.   XII: Tongue midline with symmetric movements.     MOTOR:          RIGHT UE: LEFT UE   Deltoid              5/5             5/5   Biceps              5/5              5/5   Triceps             5/5              5/5   Finger abd        5/5             5/5             RIGHT LE: LEFT LE:   HF     5/5            5/5   PF                      5/5            5/5   DF                     5/5             5/5     SENSATION:   Light touch intact and symmetric.     CEREBELLAR:   Normal F-N-F. No dysmetria. No nystagmus.     GAIT:   Deferred        Review of Diagnostics:  I personally reviewed and interpreted the followin/05/24 06:59   Sodium 136   Potassium 3.7   Chloride 100   Carbon Dioxide (CO2) 25   Urea Nitrogen 9.3   Creatinine 0.77   GFR Estimate >90   Calcium 8.7 (L)   Anion Gap 11        24 21:07   Albumin 4.4   Protein Total 7.4   Alkaline Phosphatase 69   ALT 23   AST 39 " "      Vitamin B12:   Lab Results   Component Value Date    B12 808 09/04/2024         Complete Blood Count:    Recent Labs   Lab Test 09/05/24  0659 09/04/24  2107   WBC 7.2 7.3   RBC 4.06* 4.14*   HGB 13.7 13.9   HCT 38.1* 38.8*   * 137*   LYMPH 25 21        HgA1c: No results found for: \"A1C\"     Thyroid Stimulating Hormone:   Lab Results   Component Value Date    TSH 2.38 09/04/2024          EEG (8/2020) - normal     EEG (9/5/2024) -   Impression: This is a mildly abnormal standard electroencephalogram showing no clear electroencephalographic epileptiform activity, but with some semirhythmic bifrontal slowing. This finding could point towards a generalized metabolic/infectious encephalopathy, but could also be indicative of a postictal phase. A longer duration EEG or clinical correlation would be required for definitive interpretation.     MRI Brain without Contrast   IMPRESSION:  1.  No acute intracranial abnormality.     2.  Age-related and chronic ischemic changes.    Impression:  Mr. Webb is a 69 year old male admitted after an apparent seizure.  He also has been diagnosed with COVID. There is a question of some history of alcohol withdrawal being a contributing factor.  His routine EEG was borderline, no clear epileptiform activity.  But given that his behavior is actually becoming increasingly strange, I think (as noted by Dr. Jaime in EEG read) a more prolonged monitoring is a good idea.      Recommendations:  - Agree with thiamine  - ciwa per primary  - Agree with switching off Wellbutrin  - Will evaluate with longer EEG monitoring          Shar Basilio MD (general neurology)  Pgr [phone]    1. Altered mental status, unspecified altered mental status type    2. Seizure (H)    3. Confusion    4. Bizarre behavior       "

## 2024-09-05 NOTE — ED TRIAGE NOTES
[place] after experiencing seizure-like episode. Per EMS, patient was standing in line when he tensed up and fell backwards. Did not hit head but had LOC for orughly 5 minutes. O2 mid 80s upon fire arrival. Pt states he doesn't remember what happened. Complaining of back pain after the fall. Hx of experiencing seizure 20 years ago. VSS. RA.      Triage Assessment (Adult)       Row Name 09/04/24 4548          Triage Assessment    Airway WDL WDL        Respiratory WDL    Respiratory WDL WDL        Skin Circulation/Temperature WDL    Skin Circulation/Temperature WDL WDL        Cardiac WDL    Cardiac WDL WDL        Peripheral/Neurovascular WDL    Peripheral Neurovascular WDL WDL        Cognitive/Neuro/Behavioral WDL    Cognitive/Neuro/Behavioral WDL orientation     Orientation disoriented to;situation

## 2024-09-05 NOTE — ED NOTES
"[facility]  ED Nurse Handoff Report    ED Chief complaint: Seizures      ED Diagnosis:   Final diagnoses:   Altered mental status, unspecified altered mental status type   Seizure (H)   Confusion   Bizarre behavior       Code Status: Full Code    Allergies: No Known Allergies    Patient Story: Triage: Patient arrives from airport via EMS after experiencing seizure-like episode. Per EMS, patient was standing in line when he tensed up and fell backwards. Did not hit head but had LOC for roughly 5 minutes. O2 mid 80s upon fire arrival. Pt states he doesn't remember what happened. Complaining of back pain after the fall. Hx of experiencing seizure 20 years ago. VSS. RA.     Focused Assessment:  Patient continues to be confused in ED, but appears to be gradually clearing throughout ED stay.  CT and MRI negative for acute findings.  Covid positive. Labs otherwise unremarkable.    Treatments and/or interventions provided: IVF  Patient's response to treatments and/or interventions: Tolerated well    To be done/followed up on inpatient unit:  Continue to monitor    Does this patient have any cognitive concerns?: Forgetful    Activity level - Baseline/Home:  Independent  Activity Level - Current:   Independent    Patient's Preferred language: English   Needed?: No    Isolation: None  Infection: Not Applicable  Patient tested for COVID 19 prior to admission: YES  Bariatric?: No    Vital Signs:   Vitals:    09/04/24 2100   BP: 125/88   Pulse: 82   Resp: 18   Temp: 98.3  F (36.8  C)   TempSrc: Oral   SpO2: 95%   Weight: 72.6 kg (160 lb)   Height: 1.676 m (5' 6\")       Cardiac Rhythm:     Was the PSS-3 completed:   Yes  What interventions are required if any?               Family Comments: None  OBS brochure/video discussed/provided to patient/family: No              Name of person given brochure if not patient: na              Relationship to patient: na    For the majority of the shift this patient's " behavior was Green.   Behavioral interventions performed were none.    ED NURSE PHONE NUMBER: [phone]

## 2024-09-05 NOTE — PLAN OF CARE
Pt here with fall, encephalopathy, rule out seizures, and COVID+. A&O x3, disoriented to situation. Neuros intermittent confusion/WFD episodes and generalized weakness. VSS, SBP<180. Regular diet, thin liquids. Takes pills whole. Up with Ax1 GB. Pt complaining of back pain. Pt scoring green on the Aggression Stop Light Tool. Thoracic Lumbar xray obtained. Plan continue EEG. Discharge pending.

## 2024-09-05 NOTE — PHARMACY-ADMISSION MEDICATION HISTORY
Pharmacy Intern Admission Medication History    Admission medication history is complete. The information provided in this note is only as accurate as the sources available at the time of the update.    Information Source(s): Family member and CareEverywhere/SureScripts via phone    Pertinent Information: Could not recall which vitamins they took yesterday in the AM.     Changes made to PTA medication list:  Added: Atorvastatin 80mg, bupropion XL 150mg, folic acid 1mg, certavite/antioxidant, vitamin B1 100mg  Deleted: None  Changed: None    Allergies reviewed with patient and updates made in EHR: yes    Medication History Completed By: Ade Peters 9/5/2024 9:05 AM    PTA Med List   Medication Sig Last Dose    atorvastatin (LIPITOR) 80 MG tablet Take 80 mg by mouth daily. 9/4/2024 at AM    buPROPion (WELLBUTRIN XL) 150 MG 24 hr tablet Take 150 mg by mouth every morning. 9/4/2024 at AM    folic acid (FOLVITE) 1 MG tablet Take 1 mg by mouth daily.     multivitamin w/minerals (CERTAVITE/ANTIOXIDANTS) tablet Take 1 tablet by mouth daily.     thiamine (B-1) 100 MG tablet Take 100 mg by mouth daily.

## 2024-09-05 NOTE — ED NOTES
Bed: ED29  Expected date: 9/4/24  Expected time: 8:45 PM  Means of arrival: Ambulance  Comments:  Priscila 542 69M fall; poss seizure

## 2024-09-06 ENCOUNTER — HOSPITAL ENCOUNTER (INPATIENT)
Dept: NEUROLOGY | Facility: CLINIC | Age: 69
Discharge: HOME | DRG: 100 | End: 2024-09-06
Attending: PSYCHIATRY & NEUROLOGY
Payer: MEDICARE

## 2024-09-06 LAB
ANION GAP SERPL CALCULATED.3IONS-SCNC: 10 MMOL/L (ref 7–15)
BUN SERPL-MCNC: 13.3 MG/DL (ref 8–23)
CALCIUM SERPL-MCNC: 9.3 MG/DL (ref 8.8–10.4)
CHLORIDE SERPL-SCNC: 101 MMOL/L (ref 98–107)
CREAT SERPL-MCNC: 0.88 MG/DL (ref 0.67–1.17)
EGFRCR SERPLBLD CKD-EPI 2021: >90 ML/MIN/1.73M2
ERYTHROCYTE [DISTWIDTH] IN BLOOD BY AUTOMATED COUNT: 12.3 % (ref 10–15)
GLUCOSE SERPL-MCNC: 74 MG/DL (ref 70–99)
HCO3 SERPL-SCNC: 27 MMOL/L (ref 22–29)
HCT VFR BLD AUTO: 38.5 % (ref 40–53)
HGB BLD-MCNC: 13.3 G/DL (ref 13.3–17.7)
MCH RBC QN AUTO: 32.6 PG (ref 26.5–33)
MCHC RBC AUTO-ENTMCNC: 34.5 G/DL (ref 31.5–36.5)
MCV RBC AUTO: 94 FL (ref 78–100)
PLATELET # BLD AUTO: 155 10E3/UL (ref 150–450)
POTASSIUM SERPL-SCNC: 3.5 MMOL/L (ref 3.4–5.3)
RBC # BLD AUTO: 4.08 10E6/UL (ref 4.4–5.9)
SODIUM SERPL-SCNC: 138 MMOL/L (ref 135–145)
WBC # BLD AUTO: 8.6 10E3/UL (ref 4–11)

## 2024-09-06 PROCEDURE — 120N000001 HC R&B MED SURG/OB

## 2024-09-06 PROCEDURE — 95714 VEEG EA 12-26 HR UNMNTR: CPT

## 2024-09-06 PROCEDURE — 250N000013 HC RX MED GY IP 250 OP 250 PS 637: Performed by: HOSPITALIST

## 2024-09-06 PROCEDURE — G0378 HOSPITAL OBSERVATION PER HR: HCPCS

## 2024-09-06 PROCEDURE — 250N000013 HC RX MED GY IP 250 OP 250 PS 637: Performed by: INTERNAL MEDICINE

## 2024-09-06 PROCEDURE — 82374 ASSAY BLOOD CARBON DIOXIDE: CPT | Performed by: INTERNAL MEDICINE

## 2024-09-06 PROCEDURE — 85014 HEMATOCRIT: CPT | Performed by: INTERNAL MEDICINE

## 2024-09-06 PROCEDURE — 84295 ASSAY OF SERUM SODIUM: CPT | Performed by: INTERNAL MEDICINE

## 2024-09-06 PROCEDURE — 36415 COLL VENOUS BLD VENIPUNCTURE: CPT | Performed by: INTERNAL MEDICINE

## 2024-09-06 PROCEDURE — 250N000011 HC RX IP 250 OP 636: Performed by: PSYCHIATRY & NEUROLOGY

## 2024-09-06 PROCEDURE — 99233 SBSQ HOSP IP/OBS HIGH 50: CPT | Performed by: HOSPITALIST

## 2024-09-06 PROCEDURE — 250N000013 HC RX MED GY IP 250 OP 250 PS 637: Performed by: PSYCHIATRY & NEUROLOGY

## 2024-09-06 RX ORDER — LEVETIRACETAM 500 MG/1
1000 TABLET ORAL 2 TIMES DAILY
Status: DISCONTINUED | OUTPATIENT
Start: 2024-09-07 | End: 2024-09-15

## 2024-09-06 RX ORDER — LEVETIRACETAM 10 MG/ML
1000 INJECTION INTRAVASCULAR ONCE
Status: COMPLETED | OUTPATIENT
Start: 2024-09-06 | End: 2024-09-06

## 2024-09-06 RX ADMIN — LEVETIRACETAM 750 MG: 750 TABLET, FILM COATED ORAL at 09:00

## 2024-09-06 RX ADMIN — THIAMINE HCL TAB 100 MG 100 MG: 100 TAB at 09:00

## 2024-09-06 RX ADMIN — FOLIC ACID 1 MG: 1 TABLET ORAL at 09:00

## 2024-09-06 RX ADMIN — ACETAMINOPHEN 650 MG: 325 TABLET ORAL at 15:38

## 2024-09-06 RX ADMIN — ACETAMINOPHEN 650 MG: 325 TABLET ORAL at 20:45

## 2024-09-06 RX ADMIN — ACETAMINOPHEN 650 MG: 325 TABLET ORAL at 05:50

## 2024-09-06 RX ADMIN — ATORVASTATIN CALCIUM 80 MG: 80 TABLET, FILM COATED ORAL at 09:00

## 2024-09-06 RX ADMIN — LIDOCAINE 2 PATCH: 560 PATCH PERCUTANEOUS; TOPICAL; TRANSDERMAL at 08:57

## 2024-09-06 RX ADMIN — LEVETIRACETAM 1000 MG: 10 INJECTION INTRAVENOUS at 20:28

## 2024-09-06 RX ADMIN — Medication 1 TABLET: at 09:00

## 2024-09-06 ASSESSMENT — ACTIVITIES OF DAILY LIVING (ADL)
ADLS_ACUITY_SCORE: 47
DEPENDENT_IADLS:: INDEPENDENT
ADLS_ACUITY_SCORE: 47

## 2024-09-06 NOTE — PROGRESS NOTES
Neurology Progress Note      Subjective    Patient feels good, from wife's perspective, he was a little bit irritable this morning.  Ivan notes that the EEG leads had been irritating and one had fallen off over night.  But he is much much better, no more bizarre comments, feels good.      Medications:    Current Facility-Administered Medications   Medication Dose Route Frequency Provider Last Rate Last Admin    acetaminophen (TYLENOL) tablet 650 mg  650 mg Oral Q4H PRN Nikolay Gore MD   650 mg at 09/06/24 1538    Or    acetaminophen (TYLENOL) Suppository 650 mg  650 mg Rectal Q4H PRN Nikolay Gore MD        atorvastatin (LIPITOR) tablet 80 mg  80 mg Oral Daily Estiven Randall MD   80 mg at 09/06/24 0900    benzocaine-menthol (CHLORASEPTIC) 6-10 MG lozenge 1 lozenge  1 lozenge Buccal Q1H PRN Nikolay Gore MD   1 lozenge at 09/05/24 0316    [Held by provider] buPROPion (WELLBUTRIN XL) 24 hr tablet 150 mg  150 mg Oral QAM Estiven Randall MD        folic acid (FOLVITE) tablet 1 mg  1 mg Oral Daily Estiven Randall MD   1 mg at 09/06/24 0900    levETIRAcetam (KEPPRA) tablet 750 mg  750 mg Oral BID Shar Basilio MD   750 mg at 09/06/24 0900    Lidocaine (LIDOCARE) 4 % Patch 2 patch  2 patch Transdermal Q24H Kerline Fraire MD   2 patch at 09/06/24 0857    LORazepam (ATIVAN) injection 2 mg  2 mg Intravenous Q3 Min PRN Nikolay Gore MD        melatonin tablet 3 mg  3 mg Oral At Bedtime PRN Nikolay Gore MD        multivitamin w/minerals (THERA-VIT-M) tablet 1 tablet  1 tablet Oral Daily Estiven Randall MD   1 tablet at 09/06/24 0900    ondansetron (ZOFRAN ODT) ODT tab 4 mg  4 mg Oral Q6H PRNikolay Echeverria MD        Or    ondansetron (ZOFRAN) injection 4 mg  4 mg Intravenous Q6H PRN Nikolay Gore MD        senna-docusate (SENOKOT-S/PERICOLACE) 8.6-50 MG per tablet 1 tablet  1 tablet Oral BID Nikolay Ponce MD        Or    galen  (SENOKOT-S/PERICOLACE) 8.6-50 MG per tablet 2 tablet  2 tablet Oral BID Nikolay Ponce MD        thiamine (B-1) tablet 100 mg  100 mg Oral Daily Estiven Randall MD   100 mg at 09/06/24 0900                Diagnostic Work-up Review:    TSH (9/4/2024) - 2.38  Vitamin B12 - 808    EEG (8/2020) - normal      EEG (9/5/2024) -   Impression: This is a mildly abnormal standard electroencephalogram showing no clear electroencephalographic epileptiform activity, but with some semirhythmic bifrontal slowing. This finding could point towards a generalized metabolic/infectious encephalopathy, but could also be indicative of a postictal phase. A longer duration EEG or clinical correlation would be required for definitive interpretation.      MRI Brain without Contrast   IMPRESSION:  1.  No acute intracranial abnormality.     2.  Age-related and chronic ischemic changes.        Impression:  69 year old male admitted 9/4/2024 after an apparent seizure.  He was diagnosed with COVID.  He had some bizarre behavior with a seizure in the past (hospitalized for this reason in 2020) but these past (and perhaps current) episode I thought could be due to alcohol withdrawal.  But with the abnormalities discovered on EEG from yesterday evening, it now seems likely that he has epilepsy.  He was loaded with Keppra yesterday.        Recommendations:  - Agree with thiamine / CIWA per primary  - Agree with switching off Wellbutrin  - Will need to establish with neurologist, continue levetiracetam 750mg BID    He is from [place] and due to return as soon as he is discharged here, so will need to consult with neurologist there about driving laws in his state, climbing up heights or use of power tools should be avoided as well at least until they consult with their local neurologist.      OK with discharge from my perspective assuming no clinical change, will sign off please page with ?s (Dr. Guaman will assume the service  tomorrow)      I spent 40 minutes on the date of encounter with the patient and before and after the visit on activities detailed in the above note which may include reviewing the EMR, documenting clinical information, and communicating with other health care professionals.    Shar Basilio MD  [facility] [phone]

## 2024-09-06 NOTE — UTILIZATION REVIEW
Admission Status; Secondary Review Determination         Under the authority of the Utilization Management Committee, the utilization review process indicated a secondary review on the above patient.  The review outcome is based on review of the medical records, discussions with staff, and applying clinical experience noted on the date of the review.        (x)      Inpatient Status Appropriate - This patient's medical care is consistent with medical management for inpatient care and reasonable inpatient medical practice.       RATIONALE FOR DETERMINATION   The patient is a 69-year-old male admitted on 9/5/2024.  Patient presented with acute confusion and convulsive syncope is found to have suspected acute seizure and acute metabolic encephalopathy.  Patient was traveling back to his home in [place].  His prior history of was positive for seizure, possibly alcohol withdrawal in 2020.  Patient was also noted to have acute metabolic acidosis possibly due to hypovolemia and was given IV fluids in the ED.  The patient has thrombocytopenia with platelet count of 38.5.  Neurology was consulted and EEG was ordered to evaluate for possible seizure issues.  Conclusion of EEG did not show seizure activity.  Additional evaluation will be required the patient will have stayed at least 3 midnight stay evaluating change mental status and other findings as mentioned above.  Recommend changing status from observation to inpatient based on complexity and need for further evaluation Dr. Rice will be notified of this recommendation.      The severity of illness, intensity of service provided, expected LOS and risk for adverse outcome make the care complex, high risk and appropriate for hospital admission.        The information on this document is developed by the utilization review team in order for the business office to ensure compliance.  This only denotes the appropriateness of proper admission status and does not reflect  the quality of care rendered.         The definitions of Inpatient Status and Observation Status used in making the determination above are those provided in the CMS Coverage Manual, Chapter 1 and Chapter 6, section 70.4.      Sincerely,     Brady Barrett MD  Physician Advisor  Utilization Review/ Case Management  [facility].

## 2024-09-06 NOTE — PROGRESS NOTES
[facility]    Medicine Progress Note - Hospitalist Service    Date of Admission:  9/4/2024    Assessment & Plan   Ivan Webb is a markedly pleasant 69 year old gentleman with past medical history that is most notable for prior seizure, who presents with acute confusion and convulsive syncope. and is found to have suspected acute seizure and acute metabolic encephalopathy.     Suspected epilepsy  Acute metabolic encephalopathy  Mr. Webb was travelling back to his home in [place] on the day of admission. He recently quit smoking several weeks ago. It seems that he was hospitalized there in 2020 for acute confusion and bizarre behavior. It was documented that he was drinking 4-6 alcoholic beverages per day at that time. While hospitalized he had a seizure. Alcohol withdrawal and/or wernicke korsakoff syndrome were suspected. No further seizures have been known to have occurred before or since.  Now, he presented for evaluation of acute confusion, culminating in a witnessed episode of convulsive syncope at the airport. In the ED, he was afebrile, without hypotension, tachycardia, or hypoxia. WBC was normal, as was hepatic panel. He had mild acute metabolic acidosis and thrombocytopenia as discussed below. UA as well as UDS were negative and Ethyl alcohol level was undetectable. CT and CTA of head and neck showed no acute processes.   Overall, his symptoms seemed consistent with a seizure. He could have alcohol withdrawal or a post-viral process.  Registered to observation initially, then admitted to inpatient on 9/6/24.  Neurology consulted, appreciate their assistance.  EEG on 9/5/24 showed PLEDS, resolved around 7:00 pm on 9/5/24.  MRI brain on 9/5/24 showed no acute abnormality.  Loaded with keppra on 9/5/24, then started on keppra 750 mg PO BID.  Seizure precautions.  TSH and vitamin B12 within normal limits.  Ammonia level low at 13.  Multivitamin, folic acid, and  thiamine.  Monitor for signs of withdrawal, although suspect this is unlikely as patient's alcohol use has diminished significantly since 2020, now has one drink per day.  Discontinue Wellbutrin.  Continue care in the hospital today. Potential discharge tomorrow pending continued improvement and no further seizure activity.     Recent COVID-19 infection  Patient developed a cough, fever, and sneezes about 1 week prior to admission.  Wife subsequently developed the same symptoms.  COVID-19 PCR was positive on 9/5/2024.  Symptoms have essentially resolved.  Afebrile.  No cough or sneezes.  Vitals okay, not hypoxic.  Monitor.    Acute metabolic acidosis  Suspect due to hypovolemia: Given IV fluid in the ED.  Resolved after IV fluids.     Thrombocytopenia, acute  Mild, platelets 137 initially. Had been normal on 6/17/24.  Could be due to acute illness.  Platelets down to 118 on 9/5, but then up to 155 on 9/6.     Hyperlipidemia  Continue PTA atorvastatin.     Tobacco use  Has been on Wellbutrin for a while (patient and wife not sure exactly how long) for tobacco cessation.  They state he stopped smoking about 2 weeks ago.  Will stop Wellbutrin as it can lower the seizure threshold.     History of prostate cancer  Status post prostactectomy.   Noted.       Observation Goals: -diagnostic tests and consults completed and resulted, -vital signs normal or at patient baseline, -returns to baseline functional status, -safe disposition plan has been identified, Nurse to notify provider when observation goals have been met and patient is ready for discharge.  Diet: Regular Diet Adult    DVT Prophylaxis: Pneumatic Compression Devices  Kennedy Catheter: Not present  Lines: None     Cardiac Monitoring: None  Code Status: Full Code      Clinically Significant Risk Factors Present on Admission                # Thrombocytopenia: Lowest platelets = 118 in last 2 days, will monitor for bleeding                            Disposition Plan      Medically Ready for Discharge: Anticipated Tomorrow             Quan Rice MD  Hospitalist Service  [facility]  Securely message with [employer] (more info)  Text page via [employer] Paging/Directory   ______________________________________________________________________    Interval History   Ivan Webb was seen this afternoon. He feels better today.  Epileptiform discharges were noted on EEG yesterday, appeared to have resolved around 7 PM last evening.  No seizure activity noted today.  Denies headache, chest pain, shortness breath, nausea, abdominal pain.  Wife was in the room with him, discussed plan of care.  Discussed plan of care with bedside nurse, care coordinator, and neurology as well.    Physical Exam   Vital Signs: Temp: 98.4  F (36.9  C) Temp src: Axillary BP: 139/67 Pulse: 64   Resp: 18 SpO2: 96 % O2 Device: None (Room air)    Weight: 160 lbs 0 oz    Constitutional: awake, alert, cooperative, no apparent distress, laying in the hospital bed with the head of the bed elevated  Respiratory: no increased work of breathing, clear to auscultation bilaterally, no crackles or wheezing  Cardiovascular: regular rate and rhythm, normal S1 and S2, no murmur noted  GI: normal bowel sounds, soft, non-distended, non-tender  Skin: warm, dry  Musculoskeletal: no lower extremity pitting edema present  Neurologic: awake, alert, answers questions appropriately, moves all extremities    Medical Decision Making       55 MINUTES SPENT BY ME on the date of service doing chart review, history, exam, documentation & further activities per the note.      Data     I have personally reviewed the following data over the past 24 hrs:    8.6  \   13.3   / 155     138 101 13.3 /  74   3.5 27 0.88 \       Imaging results reviewed over the past 24 hrs:   Recent Results (from the past 24 hour(s))   XR Thoracic Lumbar Standing 2 Views    Narrative    EXAM: XR THORACIC LUMBAR STANDING 2  VIEWS  LOCATION: [facility]  DATE: 9/5/2024    INDICATION: LBP. r o fracture  COMPARISON: None.      Impression    IMPRESSION: Age-indeterminate fractures at T7, T8, T9, and T12. Normal alignment. Mild multilevel disc height loss and facet hypertrophy. Aortic atherosclerotic versus.

## 2024-09-06 NOTE — CONSULTS
Care Management Initial Consult    General Information  Assessment completed with: Spouse or significant other, Alaina  Type of CM/SW Visit: Initial Assessment    Primary Care Provider verified and updated as needed: Yes   Readmission within the last 30 days: no previous admission in last 30 days      Reason for Consult: discharge planning  Advance Care Planning: Advance Care Planning Reviewed: no concerns identified          Communication Assessment  Patient's communication style: spoken language (English or Bilingual)             Cognitive  Cognitive/Neuro/Behavioral: WDL  Level of Consciousness: alert, confused  Arousal Level: opens eyes spontaneously  Orientation: oriented x 4  Mood/Behavior: calm, cooperative  Best Language: 0 - No aphasia  Speech: clear, logical    Living Environment:   People in home: spouse  Alaina  Current living Arrangements: house      Able to return to prior arrangements: yes       Family/Social Support:  Care provided by: self  Provides care for: no one  Marital Status:   Support system: Wife, Children, Sibling(s), Friend, Neighbor  Alaina       Description of Support System: Supportive, Involved    Support Assessment: Adequate family and caregiver support    Current Resources:   Patient receiving home care services: No        Community Resources: None  Equipment currently used at home: none  Supplies currently used at home: None    Employment/Financial:  Employment Status: retired        Financial Concerns: none   Referral to Financial Worker: No       Does the patient's insurance plan have a 3 day qualifying hospital stay waiver?  No    Lifestyle & Psychosocial Needs:  Social Determinants of Health     Food Insecurity: Not on file   Depression: Not at risk (11/27/2020)    [facility]    PHQ-2     PHQ-2 Score: 0   Housing Stability: Not on file   Tobacco Use: Medium Risk (9/5/2024)    Patient History     Smoking Tobacco Use: Former     Smokeless Tobacco Use:  Unknown     Passive Exposure: Not on file   Financial Resource Strain: Not on file   Alcohol Use: Not At Risk (11/27/2020)    Received from [facility]    AUDIT-C     Frequency of Alcohol Consumption: 4 or more times a week     Average Number of Drinks: 1 or 2     Frequency of Binge Drinking: Never   Transportation Needs: Not on file   Physical Activity: Not on file   Interpersonal Safety: Not on file   Stress: Not on file   Social Connections: Not on file   Health Literacy: Not on file       Functional Status:  Prior to admission patient needed assistance:   Dependent ADLs:: Independent  Dependent IADLs:: Independent  Assesssment of Functional Status: At functional baseline    Mental Health Status:          Chemical Dependency Status:                Values/Beliefs:  Spiritual, Cultural Beliefs, Restoration Practices, Values that affect care: no               Discussed  Partnership in Safe Discharge Planning  document with patient/family: No    Additional Information:      Consulted for discharge planning, writer called Pt's room phone and spoke to the Pt's wife,Alaina. Writer went over [facility] information and verbalized understanding.  Writer introduced self and role in discharge planning. Pt lives in a house with his Wife, Aalina. Pt and Wife were on their way home from a 12 day tour of [employer] and were on their way back to [place].  Per Alaina, prior to admit, Pt was independent with all ADL's and was still driving. Pt denies any financial concerns at this time. Pt wasn't receiving any [facility] or [facility] services.Pt will discharge home with no needs. Wife Alaina, asked writer if I could giver her information on Hotels close to [facility] with shuttle services. Pt also wanted transportation information to get to the hotel. Writer printed off hotel information and [employer] taxi number to be able to book a hotel and schedule a taxi for discharge.     Pt WOULD NEED A MEDICAL NOTE TO BE CLEARED TO  FLY            Inpatient Care Coordinator will continue to follow for discharge needs.      Next Steps: Planning discharge 9/7, no needs        Marimar Palmer RN, BSN, Care Coordinator

## 2024-09-06 NOTE — PLAN OF CARE
Goal Outcome Evaluation:  9/5/2024 [id number]   Present acute confusion and convulsive syncope. and is found to have suspected acute seizure and acute metabolic encephalopathy.     Orientation: AOX2-3,  Neuros intermittent confusion/WFD episodes and generalized weakness.  Vitals/Tele: VSS RA, NSR  IV Access/drains: 1PIV SL  Diet: Reg, take pill whole  Mobility: AX1 gbw  GI/: continent   Wound/Skin:   Consults: Fall and seizure precautions. Q 4 neuro checks. EEG continue to monitor overnight   Discharge Plan: TBD  - C/O Back pain due to fall, CT done prior shift, see results. Lidocaine patches in lower back, prn Tylenol given,  Covid positive.wife at bedside.   See Flow sheets for assessment

## 2024-09-06 NOTE — PROGRESS NOTES
Care Management Discharge Note    Discharge Date: 09/07/2024       Discharge Disposition: Home    Discharge Services: None    Discharge DME: None    Discharge Transportation: public transportation (Gave number for the [employer] taxi)    Private pay costs discussed: Not applicable    Does the patient's insurance plan have a 3 day qualifying hospital stay waiver?  No    PAS Confirmation Code:    Patient/family educated on Medicare website which has current facility and service quality ratings: no    Education Provided on the Discharge Plan:    Persons Notified of Discharge Plans: Bedside RN  Patient/Family in Agreement with the Plan: yes    Handoff Referral Completed: No, handoff not indicated or clinically appropriate    Additional Information:    No further care management intervention anticipated at this time.  Please re-consult if further needs arise.  Care management signing off.            Marimar Palmer, RN, BSN, Care Coordinator

## 2024-09-06 NOTE — PROVIDER NOTIFICATION
MD Notification    Notified Person: MD    Notified Person Name: Dr Fraire    Notification Date/Time: 758pm 9/5/2024     Notification Interaction: kaveh    Purpose of Notification: requesting lidocaine patches for pt's lower back pain      Orders Received: ordered    Comments:

## 2024-09-06 NOTE — PROGRESS NOTES
Reviewed EEG again last night and this morning.  The PLEDs from last evening became rare, and proceeded to resolve completely by about 7 PM.  Since then, the EEG has been basically normal, showing normal sleep patterns and infrequent sharp waves and isolated spikes appear in the left frontal region; but no epileptiform activity.       Nevertheless, no electroencephalographic seizure activity is noted. Discussed these findings with Dr. Lynette Feliz, who is on-call.

## 2024-09-07 ENCOUNTER — HOSPITAL ENCOUNTER (INPATIENT)
Dept: NEUROLOGY | Facility: CLINIC | Age: 69
Discharge: HOME | DRG: 100 | End: 2024-09-07
Attending: PSYCHIATRY & NEUROLOGY
Payer: MEDICARE

## 2024-09-07 LAB
CREAT SERPL-MCNC: 0.81 MG/DL (ref 0.67–1.17)
EGFRCR SERPLBLD CKD-EPI 2021: >90 ML/MIN/1.73M2

## 2024-09-07 PROCEDURE — 250N000013 HC RX MED GY IP 250 OP 250 PS 637: Performed by: INTERNAL MEDICINE

## 2024-09-07 PROCEDURE — 36415 COLL VENOUS BLD VENIPUNCTURE: CPT | Performed by: INTERNAL MEDICINE

## 2024-09-07 PROCEDURE — 999N000052 EEG VIDEO 12-26 HR UNMONITORED

## 2024-09-07 PROCEDURE — 120N000001 HC R&B MED SURG/OB

## 2024-09-07 PROCEDURE — 82565 ASSAY OF CREATININE: CPT | Performed by: INTERNAL MEDICINE

## 2024-09-07 PROCEDURE — 250N000011 HC RX IP 250 OP 636: Performed by: INTERNAL MEDICINE

## 2024-09-07 PROCEDURE — 250N000013 HC RX MED GY IP 250 OP 250 PS 637: Performed by: HOSPITALIST

## 2024-09-07 PROCEDURE — 250N000013 HC RX MED GY IP 250 OP 250 PS 637: Performed by: PSYCHIATRY & NEUROLOGY

## 2024-09-07 PROCEDURE — 99233 SBSQ HOSP IP/OBS HIGH 50: CPT | Performed by: INTERNAL MEDICINE

## 2024-09-07 RX ORDER — ENOXAPARIN SODIUM 100 MG/ML
40 INJECTION SUBCUTANEOUS EVERY 24 HOURS
Status: DISCONTINUED | OUTPATIENT
Start: 2024-09-07 | End: 2024-09-18 | Stop reason: HOSPADM

## 2024-09-07 RX ADMIN — FOLIC ACID 1 MG: 1 TABLET ORAL at 08:42

## 2024-09-07 RX ADMIN — ACETAMINOPHEN 650 MG: 325 TABLET ORAL at 05:08

## 2024-09-07 RX ADMIN — Medication 1 TABLET: at 08:42

## 2024-09-07 RX ADMIN — ENOXAPARIN SODIUM 40 MG: 40 INJECTION SUBCUTANEOUS at 16:22

## 2024-09-07 RX ADMIN — LEVETIRACETAM 1000 MG: 500 TABLET, FILM COATED ORAL at 08:42

## 2024-09-07 RX ADMIN — MELATONIN TAB 3 MG 3 MG: 3 TAB at 21:48

## 2024-09-07 RX ADMIN — THIAMINE HCL TAB 100 MG 100 MG: 100 TAB at 08:42

## 2024-09-07 RX ADMIN — ACETAMINOPHEN 650 MG: 325 TABLET ORAL at 20:09

## 2024-09-07 RX ADMIN — ATORVASTATIN CALCIUM 80 MG: 80 TABLET, FILM COATED ORAL at 08:42

## 2024-09-07 RX ADMIN — LIDOCAINE 2 PATCH: 560 PATCH PERCUTANEOUS; TOPICAL; TRANSDERMAL at 08:43

## 2024-09-07 RX ADMIN — LEVETIRACETAM 1000 MG: 500 TABLET, FILM COATED ORAL at 20:10

## 2024-09-07 ASSESSMENT — ACTIVITIES OF DAILY LIVING (ADL)
ADLS_ACUITY_SCORE: 47
ADLS_ACUITY_SCORE: 23
ADLS_ACUITY_SCORE: 47
ADLS_ACUITY_SCORE: 47
ADLS_ACUITY_SCORE: 23
ADLS_ACUITY_SCORE: 23
ADLS_ACUITY_SCORE: 24
ADLS_ACUITY_SCORE: 23
ADLS_ACUITY_SCORE: 47
ADLS_ACUITY_SCORE: 24
ADLS_ACUITY_SCORE: 47
ADLS_ACUITY_SCORE: 23
ADLS_ACUITY_SCORE: 47
ADLS_ACUITY_SCORE: 23
ADLS_ACUITY_SCORE: 24
ADLS_ACUITY_SCORE: 24
ADLS_ACUITY_SCORE: 29
ADLS_ACUITY_SCORE: 47

## 2024-09-07 NOTE — PROGRESS NOTES
Neurology Note  [facility]    Patient Name: Ivan Webb  MRN: [id number]  : 1955  Visit Date: 2024    Subjective:  He continues to be confused today.  When I ask him why he is in the hospital he says that something about a plane and he says how the plane is round in front.  Wife feels that his mentation has not been improving and he has intermittent confusion however he is able to follow all commands.    Past Medical History:  Past Medical History:   Diagnosis Date    Depression     History of colonic polyps     History of diverticulitis     HTN (hypertension)     Hyperlipidemia     Nephrolithiasis     Prostate cancer (H)     Pulmonary nodules     Seizure (H) 2020    Thrombosed external hemorrhoids      Past Surgical History:  Past Surgical History:   Procedure Laterality Date    COLONOSCOPY      PROSTATECTOMY       Medications:  No current outpatient medications on file.     Allergies:  No Known Allergies  Family History:  Family History   Problem Relation Age of Onset    Cancer Mother     Cancer Father      Social History:  Social History     Socioeconomic History    Marital status:      Spouse name: Not on file    Number of children: Not on file    Years of education: Not on file    Highest education level: Not on file   Occupational History    Not on file   Tobacco Use    Smoking status: Former     Types: Cigarettes    Smokeless tobacco: Not on file   Substance and Sexual Activity    Alcohol use: Yes    Drug use: Not on file    Sexual activity: Not on file   Other Topics Concern    Not on file   Social History Narrative    Not on file     Social Determinants of Health     Financial Resource Strain: Low Risk  (2024)    Financial Resource Strain     Within the past 12 months, have you or your family members you live with been unable to get utilities (heat, electricity) when it was really needed?: No   Food Insecurity: Low Risk  (2024)    Food  "Insecurity     Within the past 12 months, did you worry that your food would run out before you got money to buy more?: No     Within the past 12 months, did the food you bought just not last and you didn t have money to get more?: No   Transportation Needs: Low Risk  (9/7/2024)    Transportation Needs     Within the past 12 months, has lack of transportation kept you from medical appointments, getting your medicines, non-medical meetings or appointments, work, or from getting things that you need?: No   Physical Activity: Not on file   Stress: Not on file   Social Connections: Not on file   Interpersonal Safety: High Risk (9/7/2024)    Interpersonal Safety     Do you feel physically and emotionally safe where you currently live?: No     Within the past 12 months, have you been hit, slapped, kicked or otherwise physically hurt by someone?: No     Within the past 12 months, have you been humiliated or emotionally abused in other ways by your partner or ex-partner?: No   Housing Stability: Low Risk  (9/7/2024)    Housing Stability     Do you have housing? : Yes     Are you worried about losing your housing?: No         Vital Signs:  /75 (BP Location: Left arm, Patient Position: Semi-Eng's, Cuff Size: Adult Regular)   Pulse 66   Temp 98.4  F (36.9  C) (Oral)   Resp 16   Ht 1.727 m (5' 8\")   Wt 72.6 kg (160 lb)   SpO2 98%   BMI 24.33 kg/m        Neurological examination  Mental Status: He is confused and is able to follow commands but is unable to state the date or why he is in the hospital.  Cranial Nerve I: Not tested.  Cranial Nerve II: The pupils are 3 mm, equally round and reactive to light.  Cranial Nerves III, IV, VI: The extraocular movements are full in all directions of gaze without  ophthalmoplegia or nystagmus.  Cranial Nerve V: Light touch is intact and symmetric in the V1, V2, V3 divisions of both  trigeminal nerves.  Cranial Nerve VII: Facial movements are symmetric.  Cranial Nerve XI: " Sternocleidomastoid strength is 5/5 bilaterally with normal shoulder shrug.  Muscle Strength: The strength was 5/5 in upper and lower extremities bilaterally.  Sensory: The sensory examination is normal for light touch bilaterally and symmetrically.  Cerebellar: The cerebellar examination is normal to finger to nose test.    Review of Diagnostics:      XR Thoracic Lumbar Standing 2 Views    Result Date: 9/5/2024  EXAM: XR THORACIC LUMBAR STANDING 2 VIEWS LOCATION: [facility] DATE: 9/5/2024 INDICATION: LBP. r o fracture COMPARISON: None.     IMPRESSION: Age-indeterminate fractures at T7, T8, T9, and T12. Normal alignment. Mild multilevel disc height loss and facet hypertrophy. Aortic atherosclerotic versus.     EEG Awake or Drowsy Routine    Result Date: 9/5/2024   Impression: This is a mildly abnormal standard electroencephalogram showing no clear electroencephalographic epileptiform activity, but with some semirhythmic bifrontal slowing.  This finding could point towards a generalized metabolic/infectious encephalopathy, but could also be indicative of a postictal phase.  A longer duration EEG or clinical correlation would be required for definitive interpretation. Isauro Jaime MD Neurologist, [facility] September 5, 2024 11:33 AM       September 6: EEG showed periodic lateral epileptiform discharges.    MR Brain w/o Contrast    Result Date: 9/5/2024  IMPRESSION: 1.  No acute intracranial abnormality. 2.  Age-related and chronic ischemic changes.  Images reviewed personally.    CTA Head Neck w Contrast    IMPRESSION: HEAD CTA: 1.  Mild atherosclerotic changes cavernous and supraclinoid ICAs bilaterally. 2.  Intracranial circulation appears otherwise normal. NECK CTA: 1.  No definite hemodynamically significant narrowing throughout major neck vessels.     CT Head w/o Contrast    Result Date: 9/4/2024  EXAM: CT HEAD W/O CONTRAST LOCATION: [facility]  HOSPITAL DATE: 9/4/2024 INDICATION: seizure, fall, confusion COMPARISON: None. TECHNIQUE: Routine CT Head without IV contrast. Multiplanar reformats. Dose reduction techniques were used. FINDINGS: INTRACRANIAL CONTENTS: No intracranial hemorrhage, extraaxial collection, or mass effect.  No CT evidence of acute infarct. Mild presumed chronic small vessel ischemic changes. Mild generalized volume loss. No hydrocephalus. VISUALIZED ORBITS/SINUSES/MASTOIDS: No intraorbital abnormality. Minimal amount of fluid left maxillary sinus. No middle ear or mastoid effusion. BONES/SOFT TISSUES: No acute abnormality.     IMPRESSION: 1.  No acute intracranial process.      No results found for this or any previous visit (from the past 24 hour(s)).    Vitamin B12:   Lab Results   Component Value Date    B12 808 09/04/2024           Thyroid Stimulating Hormone:   Lab Results   Component Value Date    TSH 2.38 09/04/2024        Recent Labs   Lab Test 09/06/24  1106 09/05/24  0659   WBC 8.6 7.2   HGB 13.3 13.7   MCV 94 94    118*      Recent Labs   Lab Test 09/06/24  1106 09/05/24  0659    136   POTASSIUM 3.5 3.7   CHLORIDE 101 100   CO2 27 25   BUN 13.3 9.3   CR 0.88 0.77   ANIONGAP 10 11   ANA 9.3 8.7*   GLC 74 107*     CMP:  Recent Labs   Lab 09/04/24  2107   PROTTOTAL 7.4   ALBUMIN 4.4   ALKPHOS 69   AST 39   ALT 23   BILITOTAL 0.9         Impression:  Mr. Webb is a 69 year old who was admitted on September 4, 2024 after he had a seizure.  He also has a history of a possible seizure with bizarre behavior in the past and hospitalized in 2020 for this.  He had an EEG that showed periodic lateralized epileptiform discharges and was started on Keppra on September 6, 2024.    Plan:    - Will continue Keppra 1000 mg twice daily.  -Intermittent confusion could be related to underlying seizures, metabolic encephalopathy or post-COVID syndrome.    Venkata Guaman MD  Neurology      Thank you very much  for allowing me to  participate in the care of this patient.   no

## 2024-09-07 NOTE — PLAN OF CARE
Date & Time: 9/6-7/24, [id number]  Summary: acute confusion and convulsive syncope. and is found to have suspected acute seizure and acute metabolic encephalopathy.  Behavior & Aggression: green  Fall Risk: yes  Orientation: AxOx4  Neuro: intact  ABNL VS/O2: WNL  ABNL Labs: see chart  Pain Management: 3-4/10 back pain- lidocaine patches and tylenol are effective   Bowel/Bladder: continent   Drains: PIV SL   Diet: reg  Activity Level: SBA   Tests/Procedures: EEG monitoring   Anticipated  DC Date: TBD- patient and wife have a flight today 9/7  Tele: NSR/SHARRON

## 2024-09-07 NOTE — PLAN OF CARE
Date & Time: 9/7 4342-1800  Diagnosis: Confusion  Procedures: NA  Orientation/Cognitive: AOx2-4, forgetful, waxes & wanes  VS/O2: VSS, RA  Mobility: Independent  Diet: Regular  Pain Management: Lidocaine patches on lower back, PRNs available  Neuro: Intact ex intermittent confusion  Bowel & Bladder: Continent  Skin: WDL  Abnormal Labs: COVID +  Tele: NSR  IV Access/Drips/Fluids: L PIV SL  Drains: NA  Tests/Imaging: EEG - in process, MRI & CT - negative  Consults: Neurology, hospitalist  Discharge Plan: Pending - possibly 9/8 if no more seizure activity

## 2024-09-07 NOTE — PROGRESS NOTES
[facility]    Medicine Progress Note - Hospitalist Service    Date of Admission:  9/4/2024    Assessment & Plan   Ivan Webb is a markedly pleasant 69 year old gentleman with past medical history that is most notable for prior seizure, who presents with acute confusion and convulsive syncope. and is found to have suspected acute seizure and acute metabolic encephalopathy.     Suspected epilepsy  Acute metabolic encephalopathy  Mr. Webb was travelling back to his home in [place] on the day of admission. He recently quit smoking several weeks ago. It seems that he was hospitalized there in 2020 for acute confusion and bizarre behavior. It was documented that he was drinking 4-6 alcoholic beverages per day at that time. While hospitalized he had a seizure. Alcohol withdrawal and/or wernicke korsakoff syndrome were suspected. No further seizures have been known to have occurred before or since.  Now, he presented for evaluation of acute confusion, culminating in a [place] episode of conv[place] the airport. In the ED, he was afebrile, without hypotension, tachycardia, or hypoxia. WBC was normal, as was hepatic panel. He had mild acute metabolic acidosis and thrombocytopenia as discussed below. UA as well as UDS were negative and Ethyl alcohol level was undetectable. CT and CTA of head and neck showed no acute processes.   Overall, his symptoms seemed consistent with a seizure. He could have alcohol withdrawal or a post-viral process.  Registered to observation initially, then admitted to inpatient on 9/6/24.  Neurology consulted, appreciate their assistance.  EEG on 9/5/24 showed PLEDS, resolved around 7:00 pm on 9/5/24.  MRI brain on 9/5/24 showed no acute abnormality.  Loaded with keppra on 9/5/24, then started on keppra 750 mg PO BID.  Seizure precautions.  TSH and vitamin B12 within normal limits.  Ammonia level low at 13.  Multivitamin, folic acid, and  thiamine.  Monitor for signs of withdrawal, although suspect this is unlikely as patient's alcohol use has diminished significantly since 2020, now has one drink per day.  Discontinue Wellbutrin.  Continue care in the hospital today. Potential discharge tomorrow pending continued improvement and no further seizure activity.     Recent COVID-19 infection  Patient developed a cough, fever, and sneezes about 1 week prior to admission.  Wife subsequently developed the same symptoms.  COVID-19 PCR was positive on 9/5/2024.  Symptoms have essentially resolved.  Afebrile.  No cough or sneezes.  Vitals okay, not hypoxic.  Monitor.     Acute metabolic acidosis  Suspect due to hypovolemia: Given IV fluid in the ED.  Resolved after IV fluids.     Thrombocytopenia, acute  Mild, platelets 137 initially. Had been normal on 6/17/24.  Could be due to acute illness.  Platelets down to 118 on 9/5, but then up to 155 on 9/6.     Hyperlipidemia  Continue PTA atorvastatin.     Tobacco use  Has been on Wellbutrin for a while (patient and wife not sure exactly how long) for tobacco cessation.  They state he stopped smoking about 2 weeks ago.  Will stop Wellbutrin as it can lower the seizure threshold.     History of prostate cancer  Status post prostactectomy.   Noted.        Diet: Regular Diet Adult    DVT Prophylaxis: Pneumatic Compression Devices  Kennedy Catheter: Not present  Lines: None     Cardiac Monitoring: None  Code Status: Full Code      Clinically Significant Risk Factors Present on Admission                              # Financial/Environmental Concerns: none               Disposition Plan     Medically Ready for Discharge: Anticipated in 2-4 Days             Estiven Randall MD  Hospitalist Service  [facility]  Securely message with [employer] (more info)  Text page via [employer] Paging/Directory   The above note was dictated using voice recognition software. Although reviewed after completion, some word and  grammatical error may remain . Please contact the author for any clarifications.  ______________________________________________________________________    Interval History   Continues to have confusion.  Discussed with patient, patient's wife and neurologist    Physical Exam   Vital Signs: Temp: 98.4  F (36.9  C) Temp src: Oral BP: 129/75 Pulse: 66   Resp: 16 SpO2: 98 % O2 Device: None (Room air)    Weight: 160 lbs 0 oz    Gen- pleasant   HEENT- NAD, AIDA  Neck- supple  CVS- I+II+ no m/r/g  RS- no tachypnea  Abdo- soft, no tenderness .   Ext- no edema   CNS-as per neurology    Medical Decision Making       51 MINUTES SPENT BY ME on the date of service doing chart review, history, exam, documentation & further activities per the note.      Data   ------------------------- PAST 24 HR DATA REVIEWED -----------------------------------------------        Imaging results reviewed over the past 24 hrs:   No results found for this or any previous visit (from the past 24 hour(s)).

## 2024-09-07 NOTE — PROGRESS NOTES
Patient is confused again. He has the same presentation he had at the admission.   Wife has noted that he became increasingly confused after 5:30 p.m.   Dr. Fair was notified.   Dr. Mills was notified at 1930.

## 2024-09-07 NOTE — PROGRESS NOTES
Brief Note    Alerted to fact that patient's mental status is becoming altered again, EEG leads still in place so will ask to have them hooked up to look for new seizures.  Will give 1000mg Keppra and increase to 1000mg BID, but not obviously seizing so will hold on Ativan unless EEG suggests ongoing epileptiform activity (which had resolved without intervention yesterday).      Shar Basilio MD

## 2024-09-07 NOTE — PROGRESS NOTES
During the day patient was AO X 4; VSS; on RA; independent.  In the evening he became confused again, redirectable.

## 2024-09-08 PROCEDURE — 250N000013 HC RX MED GY IP 250 OP 250 PS 637: Performed by: HOSPITALIST

## 2024-09-08 PROCEDURE — 250N000013 HC RX MED GY IP 250 OP 250 PS 637: Performed by: PSYCHIATRY & NEUROLOGY

## 2024-09-08 PROCEDURE — 250N000011 HC RX IP 250 OP 636: Performed by: INTERNAL MEDICINE

## 2024-09-08 PROCEDURE — 250N000013 HC RX MED GY IP 250 OP 250 PS 637: Performed by: INTERNAL MEDICINE

## 2024-09-08 PROCEDURE — 99232 SBSQ HOSP IP/OBS MODERATE 35: CPT | Performed by: INTERNAL MEDICINE

## 2024-09-08 PROCEDURE — 120N000001 HC R&B MED SURG/OB

## 2024-09-08 RX ORDER — METHOCARBAMOL 500 MG/1
500 TABLET, FILM COATED ORAL 3 TIMES DAILY PRN
Status: DISCONTINUED | OUTPATIENT
Start: 2024-09-08 | End: 2024-09-09

## 2024-09-08 RX ADMIN — METHOCARBAMOL 500 MG: 500 TABLET ORAL at 17:06

## 2024-09-08 RX ADMIN — FOLIC ACID 1 MG: 1 TABLET ORAL at 09:22

## 2024-09-08 RX ADMIN — SENNOSIDES AND DOCUSATE SODIUM 1 TABLET: 50; 8.6 TABLET ORAL at 13:05

## 2024-09-08 RX ADMIN — LIDOCAINE 2 PATCH: 560 PATCH PERCUTANEOUS; TOPICAL; TRANSDERMAL at 09:20

## 2024-09-08 RX ADMIN — LEVETIRACETAM 1000 MG: 500 TABLET, FILM COATED ORAL at 09:22

## 2024-09-08 RX ADMIN — ENOXAPARIN SODIUM 40 MG: 40 INJECTION SUBCUTANEOUS at 09:22

## 2024-09-08 RX ADMIN — ATORVASTATIN CALCIUM 80 MG: 80 TABLET, FILM COATED ORAL at 09:22

## 2024-09-08 RX ADMIN — MELATONIN TAB 3 MG 3 MG: 3 TAB at 22:06

## 2024-09-08 RX ADMIN — LEVETIRACETAM 1000 MG: 500 TABLET, FILM COATED ORAL at 22:06

## 2024-09-08 RX ADMIN — THIAMINE HCL TAB 100 MG 100 MG: 100 TAB at 09:22

## 2024-09-08 RX ADMIN — Medication 1 TABLET: at 09:22

## 2024-09-08 RX ADMIN — ACETAMINOPHEN 650 MG: 325 TABLET ORAL at 16:16

## 2024-09-08 ASSESSMENT — ACTIVITIES OF DAILY LIVING (ADL)
ADLS_ACUITY_SCORE: 24

## 2024-09-08 NOTE — PROGRESS NOTES
Goal Outcome Evaluation:          Reason for Admission: seizure     Cognitive/Mentation: A/Ox 3 dis to time  Neuros/CMS: Intact ex confusion  VS: stable.   Tele: SR.  /GI: Continent. PRN senna given .   Pulmonary: LS clear.  Pain: intermittent back pain, lidocaine patches in place.     Drains/Lines: no PIV pt pulled out.   Skin: CDI  Activity: IND.  Diet: reg with thin liquids. Takes pills whole.      Therapies recs: pending  Discharge: pending     Aggression Stoplight Tool: green

## 2024-09-08 NOTE — PROGRESS NOTES
[facility]    Medicine Progress Note - Hospitalist Service    Date of Admission:  9/4/2024    Assessment & Plan   Ivan Webb is a markedly pleasant 69 year old gentleman with past medical history that is most notable for prior seizure, who presents with acute confusion and convulsive syncope. and is found to have suspected acute seizure and acute metabolic encephalopathy.     Suspected epilepsy  Acute metabolic encephalopathy  Mr. Webb was travelling back to his home in [place] on the day of admission. He recently quit smoking several weeks ago. It seems that he was hospitalized there in 2020 for acute confusion and bizarre behavior. It was documented that he was drinking 4-6 alcoholic beverages per day at that time. While hospitalized he had a seizure. Alcohol withdrawal and/or wernicke korsakoff syndrome were suspected. No further seizures have been known to have occurred before or since.  Now, he presented for evaluation of acute confusion, culminating [place] the airport. In the ED, he was afebrile, without hypotension, tachycardia, or hypoxia. WBC was normal, as was hepatic panel. He had mild acute metabolic acidosis and thrombocytopenia as discussed below. UA as well as UDS were negative and Ethyl alcohol level was undetectable. CT and CTA of head and neck showed no acute processes.   Overall, his symptoms seemed consistent with a seizure. He could have alcohol withdrawal or a post-viral process.  Registered to observation initially, then admitted to inpatient on 9/6/24.  Neurology consulted, appreciate their assistance.  EEG on 9/5/24 showed PLEDS, resolved around 7:00 pm on 9/5/24.  MRI brain on 9/5/24 showed no acute abnormality.  Loaded with keppra on 9/5/24, then started on keppra 750 mg PO BID.  Seizure precautions.  TSH and vitamin B12 within normal limits.  Ammonia level low at 13.  Multivitamin, folic acid, and  thiamine.  Monitor for signs of withdrawal, although suspect this is unlikely as patient's alcohol use has diminished significantly since 2020, now has one drink per day.  Discontinued Wellbutrin.  Discussed with neurology 09/08.  Continues with mild encephalopathy on EEG.  Will discontinue today.  Continue with Keppra.  Will monitor another 24 hours and if remains stable likely for discharge tomorrow    Recent COVID-19 infection  Patient developed a cough, fever, and sneezes about 1 week prior to admission.  Wife subsequently developed the same symptoms.  COVID-19 PCR was positive on 9/5/2024.  Symptoms have essentially resolved.  Afebrile.  No cough or sneezes.  Vitals okay, not hypoxic.  Monitor.     Acute metabolic acidosis  Suspect due to hypovolemia: Given IV fluid in the ED.  Resolved after IV fluids.     Thrombocytopenia, acute  Mild, platelets 137 initially. Had been normal on 6/17/24.  Could be due to acute illness.  Platelets down to 118 on 9/5, but then up to 155 on 9/6.     Hyperlipidemia  Continue PTA atorvastatin.     Tobacco use  Has been on Wellbutrin for a while (patient and wife not sure exactly how long) for tobacco cessation.  They state he stopped smoking about 2 weeks ago.  Will stop Wellbutrin as it can lower the seizure threshold.     History of prostate cancer  Status post prostactectomy.   Noted.        Diet: Regular Diet Adult    DVT Prophylaxis: Pneumatic Compression Devices  Kennedy Catheter: Not present  Lines: None     Cardiac Monitoring: None  Code Status: Full Code      Clinically Significant Risk Factors                                # Financial/Environmental Concerns: none               Disposition Plan     Medically Ready for Discharge: Anticipated in 2-4 Days             Estiven Randall MD  Hospitalist Service  [facility]  Securely message with [employer] (more info)  Text page via [employer] Paging/Directory   The above note was dictated using voice recognition  software. Although reviewed after completion, some word and grammatical error may remain . Please contact the author for any clarifications.  ______________________________________________________________________    Interval History   Feels much improved today.  Fidgety and still with mild confusion as per wife  Breathing stable  Physical Exam   Vital Signs: Temp: 97.6  F (36.4  C) Temp src: Axillary BP: 130/86 Pulse: 77   Resp: 16 SpO2: 95 % O2 Device: None (Room air)    Weight: 160 lbs 0 oz    Gen- pleasant   HEENT- NAD, AIDA  Neck- supple  CVS- I+II+ no m/r/g  RS- no tachypnea  Abdo- soft, no tenderness .   Ext- no edema   CNS-as per neurology.  Knows his name, date of birth, place  Can count 10 to 1 backwards    Medical Decision Making       51 MINUTES SPENT BY ME on the date of service doing chart review, history, exam, documentation & further activities per the note.      Data   ------------------------- PAST 24 HR DATA REVIEWED -----------------------------------------------        Imaging results reviewed over the past 24 hrs:   No results found for this or any previous visit (from the past 24 hour(s)).

## 2024-09-08 NOTE — PROGRESS NOTES
Neurology Note  [facility]    Patient Name: Ivan Webb  MRN: [id number]  : 1955  Visit Date: 2024    Subjective:  He is much more alert and attentive today.  He says he is in [facility].  When asking the date he says he was admitted on  and it has been 4 days since he is in the hospital.  He is able to follow commands.  He is able to identify the president and says the 's name as well.    Past Medical History:  Past Medical History:   Diagnosis Date    Depression     History of colonic polyps     History of diverticulitis     HTN (hypertension)     Hyperlipidemia     Nephrolithiasis     Prostate cancer (H)     Pulmonary nodules     Seizure (H) 2020    Thrombosed external hemorrhoids      Past Surgical History:  Past Surgical History:   Procedure Laterality Date    COLONOSCOPY      PROSTATECTOMY       Medications:  No current outpatient medications on file.     Allergies:  No Known Allergies  Family History:  Family History   Problem Relation Age of Onset    Cancer Mother     Cancer Father      Social History:  Social History     Socioeconomic History    Marital status:      Spouse name: Not on file    Number of children: Not on file    Years of education: Not on file    Highest education level: Not on file   Occupational History    Not on file   Tobacco Use    Smoking status: Former     Types: Cigarettes    Smokeless tobacco: Not on file   Substance and Sexual Activity    Alcohol use: Yes    Drug use: Not on file    Sexual activity: Not on file   Other Topics Concern    Not on file   Social History Narrative    Not on file     Social Determinants of Health     Financial Resource Strain: Low Risk  (2024)    Financial Resource Strain     Within the past 12 months, have you or your family members you live with been unable to get utilities (heat, electricity) when it was really needed?: No   Food Insecurity: Low Risk   "(9/7/2024)    Food Insecurity     Within the past 12 months, did you worry that your food would run out before you got money to buy more?: No     Within the past 12 months, did the food you bought just not last and you didn t have money to get more?: No   Transportation Needs: Low Risk  (9/7/2024)    Transportation Needs     Within the past 12 months, has lack of transportation kept you from medical appointments, getting your medicines, non-medical meetings or appointments, work, or from getting things that you need?: No   Physical Activity: Not on file   Stress: Not on file   Social Connections: Not on file   Interpersonal Safety: High Risk (9/7/2024)    Interpersonal Safety     Do you feel physically and emotionally safe where you currently live?: No     Within the past 12 months, have you been hit, slapped, kicked or otherwise physically hurt by someone?: No     Within the past 12 months, have you been humiliated or emotionally abused in other ways by your partner or ex-partner?: No   Housing Stability: Low Risk  (9/7/2024)    Housing Stability     Do you have housing? : Yes     Are you worried about losing your housing?: No         Vital Signs:  /86 (BP Location: Left arm)   Pulse 77   Temp 97.6  F (36.4  C) (Axillary)   Resp 16   Ht 1.727 m (5' 8\")   Wt 72.6 kg (160 lb)   SpO2 95%   BMI 24.33 kg/m        Neurological examination  Mental Status: He is much more alert and attentive today.  He says he is in [facility].  When asking the date he says he was admitted on September 4 and it has been 4 days since he is in the hospital.  He is able to follow commands.  He is able to identify the president and says the 's name as well.  He moves all 4 extremities spontaneously and to command.    Review of Diagnostics:      XR Thoracic Lumbar Standing 2 Views    Result Date: 9/5/2024  EXAM: XR THORACIC LUMBAR STANDING 2 VIEWS LOCATION: [facility] DATE: " 9/5/2024 INDICATION: LBP. r o fracture COMPARISON: None.     IMPRESSION: Age-indeterminate fractures at T7, T8, T9, and T12. Normal alignment. Mild multilevel disc height loss and facet hypertrophy. Aortic atherosclerotic versus.     EEG Awake or Drowsy Routine    Result Date: 9/5/2024   Impression: This is a mildly abnormal standard electroencephalogram showing no clear electroencephalographic epileptiform activity, but with some semirhythmic bifrontal slowing.  This finding could point towards a generalized metabolic/infectious encephalopathy, but could also be indicative of a postictal phase.  A longer duration EEG or clinical correlation would be required for definitive interpretation. Isauro Jaime MD Neurologist, [facility] September 5, 2024 11:33 AM       September 6: EEG showed periodic lateral epileptiform discharges.    MR Brain w/o Contrast    Result Date: 9/5/2024  IMPRESSION: 1.  No acute intracranial abnormality. 2.  Age-related and chronic ischemic changes.  Images reviewed personally.    CTA Head Neck w Contrast    IMPRESSION: HEAD CTA: 1.  Mild atherosclerotic changes cavernous and supraclinoid ICAs bilaterally. 2.  Intracranial circulation appears otherwise normal. NECK CTA: 1.  No definite hemodynamically significant narrowing throughout major neck vessels.     CT Head w/o Contrast    Result Date: 9/4/2024  EXAM: CT HEAD W/O CONTRAST LOCATION: [facility] DATE: 9/4/2024 INDICATION: seizure, fall, confusion COMPARISON: None. TECHNIQUE: Routine CT Head without IV contrast. Multiplanar reformats. Dose reduction techniques were used. FINDINGS: INTRACRANIAL CONTENTS: No intracranial hemorrhage, extraaxial collection, or mass effect.  No CT evidence of acute infarct. Mild presumed chronic small vessel ischemic changes. Mild generalized volume loss. No hydrocephalus. VISUALIZED ORBITS/SINUSES/MASTOIDS: No intraorbital abnormality. Minimal amount of fluid  left maxillary sinus. No middle ear or mastoid effusion. BONES/SOFT TISSUES: No acute abnormality.     IMPRESSION: 1.  No acute intracranial process.      No results found for this or any previous visit (from the past 24 hour(s)).    Vitamin B12:   Lab Results   Component Value Date    B12 808 09/04/2024           Thyroid Stimulating Hormone:   Lab Results   Component Value Date    TSH 2.38 09/04/2024        Recent Labs   Lab Test 09/06/24  1106 09/05/24  0659   WBC 8.6 7.2   HGB 13.3 13.7   MCV 94 94    118*      Recent Labs   Lab Test 09/07/24  1103 09/06/24  1106 09/05/24  0659   NA  --  138 136   POTASSIUM  --  3.5 3.7   CHLORIDE  --  101 100   CO2  --  27 25   BUN  --  13.3 9.3   CR 0.81 0.88 0.77   ANIONGAP  --  10 11   ANA  --  9.3 8.7*   GLC  --  74 107*     CMP:  Recent Labs   Lab 09/04/24  2107   PROTTOTAL 7.4   ALBUMIN 4.4   ALKPHOS 69   AST 39   ALT 23   BILITOTAL 0.9         Impression:  Mr. Webb is a 69 year old who was admitted on September 4, 2024 after he had a seizure.  He also has a history of a possible seizure with bizarre behavior in the past and hospitalized in 2020 for this.  He had an EEG that showed periodic lateralized epileptiform discharges and was started on Keppra on September 6, 2024.    Plan:    -Will continue Keppra 1000 mg twice daily.  -Intermittent confusion but improving, could be related to metabolic encephalopathy or post-COVID syndrome.  Can discontinue EEG.  -No further recommendations at this time. Neurology will sign off. Please contact us with any questions or concerns. Thank you    Venkata Guaman MD  Neurology      Thank you very much  for allowing me to participate in the care of this patient.

## 2024-09-08 NOTE — PROVIDER NOTIFICATION
FYI pt reporting new pain in upper thigh. Pt confused and trying to climb out of bed, and onto other furniture per wife. Wife is wondering if a medication would be appropriate for when this happens. I have prn tylenol for back pain and upper thigh pain.  Evita Bucio RN on 9/8/2024 at 4:27 PM    Added Robaxin

## 2024-09-08 NOTE — PLAN OF CARE
Date & Time: 9/7-8/24, 7900-3917  Diagnosis: Confusion  Orientation/Cognitive: Aox3 beginning of shift, Alert to self middle of shift   VS/O2: VSS, RA  Mobility: SBA  Diet: Regular  Pain Management: Lidocaine patches on lower back, PRNs available  Neuro: Intact ex intermittent confusion- neuro aware, unable to follow most commands, impulsive earlier in NOC. Patient able to follow commands and AxOx4 at 0400 neuro check  Bowel & Bladder: Continent  Skin: WDL  Abnormal Labs: COVID +  Tele: SB  IV Access/Drips/Fluids: No IV access due to pt ripped out IV  Tests/Imaging: EEG - in process, MRI & CT - negative  Consults: Neurology, hospitalist  Discharge Plan: Pending - possibly 9/8 if no more seizure activity, neuro change unknown d/t melatonin given and possible hospital delirium

## 2024-09-08 NOTE — PLAN OF CARE
Reason for Admission: seizure     Cognitive/Mentation: A/Ox to self, orientation waxes and wanes  Neuros/CMS: Intact ex confusion  VS: stable.   Tele: SR.  /GI: Continent.  Pulmonary: LS clear.  Pain: intermittent back pain, lidocaine patches in place. PRN tylenol and robaxin given. Pt reported new thigh pain- provider notified.      Drains/Lines: no PIV pt pulled out.   Skin: CDI  Activity: Ind and SBA when confused   Diet: reg with thin liquids. Takes pills whole.      Therapies recs: pending  Discharge: pending     Aggression Stoplight Tool: green

## 2024-09-09 ENCOUNTER — APPOINTMENT (OUTPATIENT)
Dept: MRI IMAGING | Facility: CLINIC | Age: 69
DRG: 100 | End: 2024-09-09
Attending: INTERNAL MEDICINE
Payer: MEDICARE

## 2024-09-09 LAB
ANION GAP SERPL CALCULATED.3IONS-SCNC: 11 MMOL/L (ref 7–15)
BUN SERPL-MCNC: 14 MG/DL (ref 8–23)
CALCIUM SERPL-MCNC: 9.4 MG/DL (ref 8.8–10.4)
CHLORIDE SERPL-SCNC: 99 MMOL/L (ref 98–107)
CREAT SERPL-MCNC: 0.79 MG/DL (ref 0.67–1.17)
EGFRCR SERPLBLD CKD-EPI 2021: >90 ML/MIN/1.73M2
ERYTHROCYTE [DISTWIDTH] IN BLOOD BY AUTOMATED COUNT: 12.1 % (ref 10–15)
GLUCOSE SERPL-MCNC: 93 MG/DL (ref 70–99)
HCO3 SERPL-SCNC: 25 MMOL/L (ref 22–29)
HCT VFR BLD AUTO: 39.5 % (ref 40–53)
HGB BLD-MCNC: 14.2 G/DL (ref 13.3–17.7)
MCH RBC QN AUTO: 33.6 PG (ref 26.5–33)
MCHC RBC AUTO-ENTMCNC: 35.9 G/DL (ref 31.5–36.5)
MCV RBC AUTO: 94 FL (ref 78–100)
PLATELET # BLD AUTO: 205 10E3/UL (ref 150–450)
POTASSIUM SERPL-SCNC: 3.6 MMOL/L (ref 3.4–5.3)
RBC # BLD AUTO: 4.22 10E6/UL (ref 4.4–5.9)
SODIUM SERPL-SCNC: 135 MMOL/L (ref 135–145)
WBC # BLD AUTO: 7.2 10E3/UL (ref 4–11)

## 2024-09-09 PROCEDURE — 250N000013 HC RX MED GY IP 250 OP 250 PS 637: Performed by: HOSPITALIST

## 2024-09-09 PROCEDURE — 999N000128 HC STATISTIC PERIPHERAL IV START W/O US GUIDANCE

## 2024-09-09 PROCEDURE — 99232 SBSQ HOSP IP/OBS MODERATE 35: CPT | Performed by: INTERNAL MEDICINE

## 2024-09-09 PROCEDURE — 36415 COLL VENOUS BLD VENIPUNCTURE: CPT | Performed by: INTERNAL MEDICINE

## 2024-09-09 PROCEDURE — 82374 ASSAY BLOOD CARBON DIOXIDE: CPT | Performed by: INTERNAL MEDICINE

## 2024-09-09 PROCEDURE — 120N000001 HC R&B MED SURG/OB

## 2024-09-09 PROCEDURE — 250N000013 HC RX MED GY IP 250 OP 250 PS 637: Performed by: INTERNAL MEDICINE

## 2024-09-09 PROCEDURE — 250N000011 HC RX IP 250 OP 636: Performed by: INTERNAL MEDICINE

## 2024-09-09 PROCEDURE — 250N000011 HC RX IP 250 OP 636: Performed by: HOSPITALIST

## 2024-09-09 PROCEDURE — 99221 1ST HOSP IP/OBS SF/LOW 40: CPT

## 2024-09-09 PROCEDURE — 250N000013 HC RX MED GY IP 250 OP 250 PS 637: Performed by: PSYCHIATRY & NEUROLOGY

## 2024-09-09 PROCEDURE — 72146 MRI CHEST SPINE W/O DYE: CPT | Mod: MG

## 2024-09-09 PROCEDURE — 85027 COMPLETE CBC AUTOMATED: CPT | Performed by: INTERNAL MEDICINE

## 2024-09-09 RX ORDER — THIAMINE HYDROCHLORIDE 100 MG/ML
500 INJECTION, SOLUTION INTRAMUSCULAR; INTRAVENOUS 3 TIMES DAILY
Status: COMPLETED | OUTPATIENT
Start: 2024-09-09 | End: 2024-09-10

## 2024-09-09 RX ORDER — LORAZEPAM 0.5 MG/1
0.5 TABLET ORAL
Status: DISCONTINUED | OUTPATIENT
Start: 2024-09-09 | End: 2024-09-11

## 2024-09-09 RX ORDER — THIAMINE HYDROCHLORIDE 100 MG/ML
250 INJECTION, SOLUTION INTRAMUSCULAR; INTRAVENOUS DAILY
Status: COMPLETED | OUTPATIENT
Start: 2024-09-11 | End: 2024-09-15

## 2024-09-09 RX ORDER — LORAZEPAM 2 MG/ML
0.5 INJECTION INTRAMUSCULAR
Status: DISCONTINUED | OUTPATIENT
Start: 2024-09-09 | End: 2024-09-12

## 2024-09-09 RX ADMIN — ACETAMINOPHEN 650 MG: 325 TABLET ORAL at 21:40

## 2024-09-09 RX ADMIN — SENNOSIDES AND DOCUSATE SODIUM 2 TABLET: 50; 8.6 TABLET ORAL at 13:37

## 2024-09-09 RX ADMIN — ATORVASTATIN CALCIUM 80 MG: 80 TABLET, FILM COATED ORAL at 08:46

## 2024-09-09 RX ADMIN — THIAMINE HYDROCHLORIDE 500 MG: 100 INJECTION, SOLUTION INTRAMUSCULAR; INTRAVENOUS at 18:23

## 2024-09-09 RX ADMIN — LIDOCAINE 2 PATCH: 560 PATCH PERCUTANEOUS; TOPICAL; TRANSDERMAL at 08:47

## 2024-09-09 RX ADMIN — ACETAMINOPHEN 650 MG: 325 TABLET ORAL at 14:48

## 2024-09-09 RX ADMIN — METHOCARBAMOL 500 MG: 500 TABLET ORAL at 14:48

## 2024-09-09 RX ADMIN — THIAMINE HYDROCHLORIDE 500 MG: 100 INJECTION, SOLUTION INTRAMUSCULAR; INTRAVENOUS at 11:46

## 2024-09-09 RX ADMIN — FOLIC ACID 1 MG: 1 TABLET ORAL at 08:46

## 2024-09-09 RX ADMIN — MELATONIN TAB 3 MG 3 MG: 3 TAB at 21:40

## 2024-09-09 RX ADMIN — LORAZEPAM 2 MG: 2 INJECTION INTRAMUSCULAR; INTRAVENOUS at 18:22

## 2024-09-09 RX ADMIN — Medication 1 TABLET: at 08:47

## 2024-09-09 RX ADMIN — LEVETIRACETAM 1000 MG: 500 TABLET, FILM COATED ORAL at 08:46

## 2024-09-09 RX ADMIN — THIAMINE HYDROCHLORIDE 500 MG: 100 INJECTION, SOLUTION INTRAMUSCULAR; INTRAVENOUS at 21:40

## 2024-09-09 RX ADMIN — THIAMINE HCL TAB 100 MG 100 MG: 100 TAB at 08:46

## 2024-09-09 RX ADMIN — ENOXAPARIN SODIUM 40 MG: 40 INJECTION SUBCUTANEOUS at 08:47

## 2024-09-09 RX ADMIN — LEVETIRACETAM 1000 MG: 500 TABLET, FILM COATED ORAL at 21:40

## 2024-09-09 ASSESSMENT — ACTIVITIES OF DAILY LIVING (ADL)
ADLS_ACUITY_SCORE: 25
ADLS_ACUITY_SCORE: 24
ADLS_ACUITY_SCORE: 25
ADLS_ACUITY_SCORE: 25
ADLS_ACUITY_SCORE: 24
ADLS_ACUITY_SCORE: 25
ADLS_ACUITY_SCORE: 24
ADLS_ACUITY_SCORE: 25
ADLS_ACUITY_SCORE: 25
ADLS_ACUITY_SCORE: 24
ADLS_ACUITY_SCORE: 25
ADLS_ACUITY_SCORE: 25

## 2024-09-09 NOTE — PROGRESS NOTES
Reason for Admission: seizure     Cognitive/Mentation: A/Oxself  Neuros/CMS: Intact ex confusion 1:1 sitter in place. Gen neuro consulted for increased confusion  VS: stable.   Tele: SR.  /GI: Continent . 2 tabs prn senna given pt constipated in am and no documented BM.  Pulmonary: LS clear.  Pain: intermittent back pain, lidocaine patches in place. Prn tylenol and robaxin given. Neurosurgery consulted and thoracic MRI ordered today.     Drains/Lines: new IV placed on shift.   Skin: CDI  Activity: IND.  Diet: reg with thin liquids. Takes pills whole.      Therapies recs: pending  Discharge: pending     Aggression Stoplight Tool: green   IV vitamin B started on shift.

## 2024-09-09 NOTE — PROGRESS NOTES
"  Neurology Progress Note               Interval History:     Ivan was noted to be more confused since yesterday by the nursing staff.  Patient received Robaxin for muscle spasms yesterday.  He has history of seizures with bizarre behavior in 2020.  He had a seizure on September 4, 2024.  His EEG showed periodic lateralizing epileptiform discharges during his hospital stay.  Patient is on Keppra 1000 mg twice a day.              Medications:   I have reviewed this patient's current medications               Physical Exam:   Blood pressure (!) 129/98, pulse 78, temperature 98  F (36.7  C), temperature source Oral, resp. rate 16, height 1.727 m (5' 8\"), weight 72.6 kg (160 lb), SpO2 98%.  GENERAL EXAMINATION:  HEENT: PERRLA, EOMI.    NEUROLOGICAL EXAMINATION  Mental Status:  Patient is awake, alert, but disoriented to time and place.  He had difficulty in following simple commands during exam though he was able to follow the commands of the nursing staff when he came from the restroom and sat down on the bed.    Cranial Nerves: Pupils are equal and reactive to light.Visual fields are full to confrontation. Extraocular movements are intact. There is no nystagmus in the horizontal or vertical planes.No facial sensory deficits. No facial weakness. The tongue is midline.     Motor: Muscle tone is normal. There is no pronator drift. There is no muscle atrophy. The strength is 5/5 in upper and lower extremities bilaterally. Deep tendon reflexes are 2+ and symmetric in  biceps, triceps, knees, and ankles. Plantars are flexor. .       Coordination: .Finger to nose - normal.         Data:     Review of Diagnostics:     I personally reviewed and interpreted the following:    EEG Video 12-26 hr Unmonitored    Result Date: 9/8/2024  EEG Video 12-26 hr Unmonitored Result VIDEO EEG DATE: 9/7/24 VIDEO EEG LOG: [facility]  LTV VIDEO EEG DAY#: 2 VIDEO EEG SOURCE FILE DURATION: 19 HRS AND 10 MIN PATIENT INFORMATION: Ivan Webb is " a 69 year old year old male who presents with episodic confusion. MEDICATIONS: Reviewed-See electronic health record for list of medications administered on the day of this recording.  TECHNICAL SUMMARY: EEG was recorded from 24 MRI COMP. WIRES scalp electrodes placed according to the 10-20 international system. Additional electrodes were used for referencing, EKG, and to record from other cerebral regions as appropriate. Video was continuously recorded and was reviewed for clinical correlation. Electrodes were attached and both video and EEG were monitored and annotated by qualified EEG technologists. Video-EEG was reviewed and report generated by qualified physician. BACKGROUND ACTIVITY:  During wakefulness, the background activity consists of synchronous and symmetric, well-modulated, 10 Hz posterior dominant rhythm. The posterior dominant rhythm attenuated with eye opening. During drowsiness, the background activity waxed and waned and there were periods of slowing and attenuation of the posterior alpha rhythm. ACTIVATION PROCEDURE: None. IMPRESSION OF VIDEO EEG DAY # 2: This video electroencephalogram is abnormal due to the presence of generalized intermittent slowing. This is a mildly abnormal awake and sleep video electroencephalogram which shows generalized slowing intermittently, pointing towards a mild encephalopathic etiology. No electrographic seizures or epileptiform discharges were recorded. Clinical correlation is advised. Isauro Jaime MD [facility] Neurology Office phone [phone]    EEG Video 12-26 hr Unmonitored    Result Date: 9/8/2024  EEG Video 12-26 hr Unmonitored Result VIDEO EEG DATE: 9/8/24 VIDEO EEG LOG: [facility]  LTV VIDEO EEG DAY#: 3 VIDEO EEG SOURCE FILE DURATION: 22 HRS AND 29 MIN PATIENT INFORMATION: Ivan Webb is a 69 year old year old male who presents with confusional episodes. MEDICATIONS: Reviewed-See electronic health record for list of medications administered on the day  of this recording.  TECHNICAL SUMMARY: EEG was recorded from 24 MRI COMP. WIRES scalp electrodes placed according to the 10-20 international system. Additional electrodes were used for referencing, EKG, and to record from other cerebral regions as appropriate. Video was continuously recorded and was reviewed for clinical correlation. Electrodes were attached and both video and EEG were monitored and annotated by qualified EEG technologists. Video-EEG was reviewed and report generated by qualified physician. BACKGROUND ACTIVITY:  During wakefulness, the background activity consists of synchronous and symmetric, well-modulated,  10-11 Hz posterior dominant rhythm. The posterior dominant rhythm attenuated with eye opening. During drowsiness, the background activity waxed and waned and there were periods of slowing and attenuation of the posterior alpha rhythm.  Normal sleep patterns were recorded in which synchronous and symmetrical spindles, slow waves and K complexes were identified.  ACTIVATION PROCEDURE: None. IMPRESSION OF VIDEO EEG DAY # 3: This video electroencephalogram is abnormal due to the presence of generalized intermittent slowing.  However, no clear PLEDs, sharp and slow wave complexes, or other epileptiform patterns are identified. This is a mildly abnormal awake and sleep video electroencephalogram due to the presence of generalized encephalopathic patterns. No electrographic seizures or epileptiform discharges were recorded. Clinical correlation is advised. Isauro Jaime MD [facility] Neurology Office phone [phone]    EEG Video 12-26 hr Unmonitored    Result Date: 9/7/2024  EEG Video 12-26 hr Unmonitored Result VIDEO EEG DATE: 9/6/24 VIDEO EEG LOG: [id number]   LTV VIDEO EEG DAY#: 1 VIDEO EEG SOURCE FILE DURATION: 21 hour and 15 min PATIENT INFORMATION: Ivan Webb is a 69 year old year old male who presents with seizures. MEDICATIONS: Reviewed-See electronic health record for list of  medications administered on the day of this recording.  TECHNICAL SUMMARY: EEG was recorded from 24MRI scalp electrodes placed according to the 10-20 international system. Additional electrodes were used for referencing, EKG, and to record from other cerebral regions as appropriate. Video was continuously recorded and was reviewed for clinical correlation. Electrodes were attached and both video and EEG were monitored and annotated by qualified EEG technologists. Video-EEG was reviewed and report generated by qualified physician. BACKGROUND ACTIVITY:  During wakefulness, the background activity consists of synchronous and symmetric, well-modulated, 10 Hz posterior dominant rhythm. The posterior dominant rhythm attenuated with eye opening. During drowsiness, the background activity waxed and waned and there were periods of slowing and attenuation of the posterior alpha rhythm. Normal sleep structures were recorded during sleep. ACTIVATION PROCEDURE: Photic. IMPRESSION OF VIDEO EEG during different phases: DAY # 1: 9/5/2024: PLEDS noted in the right> left frontal leads, most prominently in the F4/C4 leads. Corresponding garbled speech and confusion noted. This pattern continues from beginning of recording until 630 pm on 9/5/2024. Day 1-2 (9/5-9/6): EEG normalizes with very rare right frontal sharp waves, but no rhythmic slowing or associated clinical abnormalities. This pattern continues until about 3 pm on day #2 (9/6/2024).  Day 2 (9/6/2024): 7pm-8pm: EEG restarted secondary to patient's clinical status changes. Record shows frequent PLEDS and spike-wave activity in the right > left frontal regions. Video correlation shows confusion. Day 2-3 8pm-11 am: EEG normalizes again. Patient conversational/ sleeping. Corresponding EEG changes noted. Day 3: 11AM- 4pm: EEG appears normal without PLEDS or spike wave discharges noted previously. Conclusion: This EEG shows PLEDS/ spine and wave activity with alternating long  phases of  normal awake and sleep video electroencephalogram recordings. Clinical correlation is advised. Isauro Jaime MD Neurologist, [facility]rology September 7, 2024 11:02 AM      XR Thoracic Lumbar Standing 2 Views    Result Date: 9/5/2024  EXAM: XR THORACIC LUMBAR STANDING 2 VIEWS LOCATION: [facility] DATE: 9/5/2024 INDICATION: LBP. r o fracture COMPARISON: None.     IMPRESSION: Age-indeterminate fractures at T7, T8, T9, and T12. Normal alignment. Mild multilevel disc height loss and facet hypertrophy. Aortic atherosclerotic versus.     EEG Awake or Drowsy Routine    Result Date: 9/5/2024  Table formatting from the original result was not included. Electroencephalogram report [facility]      [September 5, 2024] Technical details: Routine, surface electrode EEG, performed using 18 channels of digitally acquired EEG, with additional channels for EKG and eye leads. Standard international 10 - 20 system employed for electrode placement.  Video monitoring was not performed. Special technical modifications: None. Duration of study: 23 min. Patient arousal states studied: Awake, resting, drowsy, asleep. Activation procedures performed: Photic stimulation. Waveform description: Predominant background during the record reveals well formed, 8-9Hz alpha rhythm, noted symmetrically, and blocking well with eye opening.  Brief instances of bifrontal, sharply contoured semirhythmic polymorphic delta/theta discharges are noted that subside into a fairly normal appearing EEG about 3 minutes into the record.  These slow waves are not noted again. Otherwise, we do not notice any unusual laterality, spike or sharp waves or other abnormal structures. An analysis of waveforms during photic stimulation revealed no significant changes. Impression: This is a mildly abnormal standard electroencephalogram showing no clear electroencephalographic epileptiform  activity, but with some semirhythmic bifrontal slowing.  This finding could point towards a generalized metabolic/infectious encephalopathy, but could also be indicative of a postictal phase.  A longer duration EEG or clinical correlation would be required for definitive interpretation. Isauro Jaime MD Neurologist, [facility] September 5, 2024 11:33 AM       MR Brain w/o Contrast    Result Date: 9/5/2024  EXAM: MR BRAIN W/O CONTRAST LOCATION: [facility] DATE: 9/5/2024 INDICATION: AMS, confusion, seizure. COMPARISON: CTA head and neck 9/4/2024. TECHNIQUE: Routine multiplanar multisequence head MRI without intravenous contrast. FINDINGS: INTRACRANIAL CONTENTS: No acute or subacute infarct. No mass, acute hemorrhage, or extra-axial fluid collections. Mild volume loss and presumed chronic small vessel ischemia. Normal position of the cerebellar tonsils. SELLA: No abnormality accounting for technique. OSSEOUS STRUCTURES/SOFT TISSUES: Normal marrow signal. The major intracranial vascular flow voids are maintained. ORBITS: No abnormality accounting for technique. SINUSES/MASTOIDS: No paranasal sinus mucosal disease. No middle ear or mastoid effusion.     IMPRESSION: 1.  No acute intracranial abnormality. 2.  Age-related and chronic ischemic changes.    CTA Head Neck w Contrast    Result Date: 9/4/2024  EXAM: CTA HEAD NECK W CONTRAST LOCATION: [facility] DATE: 9/4/2024 INDICATION: seizure, fall, confusion COMPARISON: None. CONTRAST: 67mL Isovue 370 TECHNIQUE: Head and neck CT angiogram with IV contrast. Axial helical CT images of the head and neck vessels obtained during the arterial phase of intravenous contrast administration. Axial 2D reconstructed images and multiplanar 3D MIP reconstructed images of the head and neck vessels were performed by the technologist. Dose reduction techniques were used. All stenosis measurements made according to  NASCET criteria unless otherwise specified. FINDINGS: HEAD CTA: ANTERIOR CIRCULATION: Mild atherosclerotic changes cavernous and supraclinoid ICAs bilaterally. Standard [place] anatomy. POSTERIOR CIRCULATION: No stenosis/occlusion, aneurysm, or high flow vascular malformation. Dominant left and smaller right vertebral artery contribute to a normal basilar artery. DURAL VENOUS SINUSES: Expected enhancement of the major dural venous sinuses. NECK CTA: RIGHT CAROTID: Less than 50% narrowing. LEFT CAROTID: Less than 50% narrowing. VERTEBRAL ARTERIES: Mild narrowing at the origin of both vertebral arteries. Both vertebral arteries appear otherwise normal. Dominant left and smaller right vertebral arteries. AORTIC ARCH: Moderate narrowing at origin/proximal aspect of both subclavian arteries. NONVASCULAR STRUCTURES: Mild compression of T3 and moderate compression of T4, age indeterminate; appear chronic.     IMPRESSION: HEAD CTA: 1.  Mild atherosclerotic changes cavernous and supraclinoid ICAs bilaterally. 2.  Intracranial circulation appears otherwise normal. NECK CTA: 1.  No definite hemodynamically significant narrowing throughout major neck vessels.     CT Head w/o Contrast    Result Date: 9/4/2024  EXAM: CT HEAD W/O CONTRAST LOCATION: [facility] DATE: 9/4/2024 INDICATION: seizure, fall, confusion COMPARISON: None. TECHNIQUE: Routine CT Head without IV contrast. Multiplanar reformats. Dose reduction techniques were used. FINDINGS: INTRACRANIAL CONTENTS: No intracranial hemorrhage, extraaxial collection, or mass effect.  No CT evidence of acute infarct. Mild presumed chronic small vessel ischemic changes. Mild generalized volume loss. No hydrocephalus. VISUALIZED ORBITS/SINUSES/MASTOIDS: No intraorbital abnormality. Minimal amount of fluid left maxillary sinus. No middle ear or mastoid effusion. BONES/SOFT TISSUES: No acute abnormality.     IMPRESSION: 1.  No acute intracranial  "process.        Vitamin B12:   Lab Results   Component Value Date    B12 808 09/04/2024         Complete Blood Count:    Recent Labs   Lab Test 09/09/24  0716 09/06/24  1106 09/05/24  0659 09/04/24  2107   WBC 7.2 8.6 7.2 7.3   RBC 4.22* 4.08* 4.06* 4.14*   HGB 14.2 13.3 13.7 13.9   HCT 39.5* 38.5* 38.1* 38.8*    155 118* 137*   LYMPH  --   --  25 21        HgA1c: No results found for: \"A1C\"     Thyroid Stimulating Hormone:   Lab Results   Component Value Date    TSH 2.38 09/04/2024        Recent Labs   Lab Test 09/09/24  0716 09/06/24  1106   WBC 7.2 8.6   HGB 14.2 13.3   MCV 94 94    155      Recent Labs   Lab Test 09/09/24  0716 09/07/24  1103 09/06/24  1106     --  138   POTASSIUM 3.6  --  3.5   CHLORIDE 99  --  101   CO2 25  --  27   BUN 14.0  --  13.3   CR 0.79 0.81 0.88   ANIONGAP 11  --  10   ANA 9.4  --  9.3   GLC 93  --  74     CMP:  Recent Labs   Lab 09/04/24  2107   PROTTOTAL 7.4   ALBUMIN 4.4   ALKPHOS 69   AST 39   ALT 23   BILITOTAL 0.9            Assessment and Plan:     Confusion: Patient was noted to be more confused overnight.  He was initially suspected to have metabolic encephalopathy versus systemic inflammatory response to COVID.  His EEG showed PLEDs in the past.  Patient is on good dose of Keppra.  I will continue the current dose of Keppra.  I will avoid any sedating agents like Robaxin that may worsen the cognition.  Patient was able to respond and follow some of the commands when he was not being examined.  This would raise the concern for functional nature.  Observe overnight.       Parish Russo MD  [facility]    "

## 2024-09-09 NOTE — CONSULTS
"[facility]    Neurosurgery Consultation     Date of Admission:  9/4/2024  Date of Consult (When I saw the patient): 09/09/24    Assessment & Plan   Ivan Webb is a 69 year old male past medical history seizure disorder, ETOH use, Nicotine use, prostate cancer who presented to the ED for evaluation of convulsive seizure and who was admitted on 9/4/2024. NSGY was consulted for multiple thoracic compression fractures.      HPI and examination limited due to patient's acute encephalopathy.   Per patient's wife, patient has history of chronic back pain without radicular pain or paresthesias. Patients wife denies acute bowel or bladder dysfunction. When asking patient about back pain and radicular symptoms patient responds \"yes\" to all questions but is not able to expand on details or questions.     Patient's wife notes they were at the airport boarding for a flight home, currently lives in [place], when patient had a convulsive seizure, falling backwards onto his backpack.  Patient began having intermittent confusion several days prior to convulsive seizure.  Patient had a similar event approximately 4 years ago and was hospitalized. Patient currently consumes multiple alcoholic drinks per day, but has recently been having 1 alcoholic drink per day while away from home. Patient also recently  stopped smoking several weeks ago with the use of Wellbutrin.    On examination, patient is pacing around hospital room, moving all extremities spontaneously and equally, tender to palpation of thoracic spine. Thoracic XR demonstrates age-indeterminate fractures at T7, T8, T9, and T12.  Per chart review, DEXA scan 2022 demonstrating osteopenia.    Discussed plan with patient and patient's wife for thoracic MRI to determine acuity of fractures.  Patient and patient's wife verbalized understanding and agreed with the current plan.      Imaging:  Imaging Interpretation provided by radiologist. " "  EXAM: XR THORACIC LUMBAR STANDING 2 VIEWS  LOCATION: [facility]  DATE: 9/5/2024     INDICATION: LBP. r o fracture  COMPARISON: None.                                                                      IMPRESSION: Age-indeterminate fractures at T7, T8, T9, and T12. Normal alignment. Mild multilevel disc height loss and facet hypertrophy. Aortic atherosclerotic versus.     NSGY Recommendations:  - Thoracic and MRI, further recs pending  - Seizure mangament per neurology    - Pain management per primary team     I have discussed the following assessment and plan with Dr. Gutiérrez who is in agreement with initial plan and will follow up with further consultation recommendations.    Tanya Mcnally PA-C  [facility] Neurosurgery  [place] [id number]    Text page via [employer] Paging/Directory    Code Status    Full Code    Reason for Consult   Reason for consult: Thoracic compression fractures    Primary Care Physician   Physician No Ref-Primary    Chief Complaint   Seizure    History is obtained from the patient, patients wife and chart review    History of Present Illness   Ivan Webb is a 69 year old male past medical history seizure disorder, ETOH use, Nicotine use, prostate cancer who presented to the ED for evaluation of convulsive seizure.     Per patient's wife, patient has history of chronic back pain without radicular pain or paresthesias. Patients wife denies acute bowel or bladder dysfunction. When asking patient about back pain and lower extremity radicular symptoms patient responds \"yes\" to all questions but is not able to expand on details.     Patient's wife notes they were at the airport boarding for a flight home, currently lives in [place], when patient had a convulsive seizure, falling backwards onto his backpack.  Patient began having intermittent confusion several days prior to convulsive seizure.  " Patient had a similar event approximately 4 years ago and was hospitalized. Patient currently consumes multiple alcoholic drinks per day, but has recently been having 1 alcoholic drink per day while away from home. Patient also recently  stopped smoking several weeks ago with the use of Wellbutrin.    Past Medical History   I have reviewed this patient's medical history and updated it with pertinent information if needed.   Past Medical History:   Diagnosis Date    Depression     History of colonic polyps     History of diverticulitis     HTN (hypertension)     Hyperlipidemia     Nephrolithiasis     Prostate cancer (H)     Pulmonary nodules     Seizure (H) 2020    Thrombosed external hemorrhoids        Past Surgical History   I have reviewed this patient's surgical history and updated it with pertinent information if needed.  Past Surgical History:   Procedure Laterality Date    COLONOSCOPY      PROSTATECTOMY         Prior to Admission Medications   Prior to Admission Medications   Prescriptions Last Dose Informant Patient Reported? Taking?   atorvastatin (LIPITOR) 80 MG tablet 9/4/2024 at AM  Yes Yes   Sig: Take 80 mg by mouth daily.   buPROPion (WELLBUTRIN XL) 150 MG 24 hr tablet 9/4/2024 at AM  Yes Yes   Sig: Take 150 mg by mouth every morning.   folic acid (FOLVITE) 1 MG tablet   Yes Yes   Sig: Take 1 mg by mouth daily.   multivitamin w/minerals (CERTAVITE/ANTIOXIDANTS) tablet   Yes Yes   Sig: Take 1 tablet by mouth daily.   thiamine (B-1) 100 MG tablet   Yes Yes   Sig: Take 100 mg by mouth daily.      Facility-Administered Medications: None     Allergies   No Known Allergies    Social History   I have reviewed this patient's social history and updated it with pertinent information if needed. Ivan Webb  reports that he has quit smoking. His smoking use included cigarettes. He does not have any smokeless tobacco history on file. He reports current alcohol use.    Family History   I have reviewed this  "patient's family history and updated it with pertinent information if needed.   Family History   Problem Relation Age of Onset    Cancer Mother     Cancer Father        ROS: 10 point ROS negative other than the symptoms noted above in the HPI.    Physical Exam   Temp: 97.9  F (36.6  C) Temp src: Oral BP: (!) 163/88 Pulse: 72   Resp: 16 SpO2: 96 % O2 Device: None (Room air)    Vital Signs with Ranges  Temp:  [97.7  F (36.5  C)-98.8  F (37.1  C)] 97.9  F (36.6  C)  Pulse:  [67-77] 72  Resp:  [16] 16  BP: (138-163)/(68-88) 163/88  SpO2:  [95 %-97 %] 96 %  160 lbs 0 oz     , Blood pressure (!) 163/88, pulse 72, temperature 97.9  F (36.6  C), temperature source Oral, resp. rate 16, height 1.727 m (5' 8\"), weight 72.6 kg (160 lb), SpO2 96%.  160 lbs 0 oz    Exam limited due acute encephalopathy.   HEENT:  Normocephalic, atraumatic.  PERRL.     NEUROLOGICAL EXAMINATION:   Mental status:  Alert and Oriented to person (not place or time), speech is fluent.  Cranial nerves:  II-XII intact.   Patient pacing around room with family at bedside.   Moving all extremities spontaneously   DF/PF 5/5, patient does not follow commands for HF/KF/EF but appears without focal deficit  Tender to palpation over thoracic spine and paraspinous muscles     Patient does not answer sensation questions appropriately.   Difficulty assessing clonus due to patient having difficulty following commands.     Data     CBC RESULTS:   Recent Labs   Lab Test 09/09/24  0716   WBC 7.2   RBC 4.22*   HGB 14.2   HCT 39.5*   MCV 94   MCH 33.6*   MCHC 35.9   RDW 12.1        Basic Metabolic Panel:  Lab Results   Component Value Date     09/09/2024      Lab Results   Component Value Date    POTASSIUM 3.6 09/09/2024     Lab Results   Component Value Date    CHLORIDE 99 09/09/2024     Lab Results   Component Value Date    ANA 9.4 09/09/2024     Lab Results   Component Value Date    CO2 25 09/09/2024     Lab Results   Component Value Date    BUN 14.0 " "09/09/2024     Lab Results   Component Value Date    CR 0.79 09/09/2024     Lab Results   Component Value Date    GLC 93 09/09/2024     INR:  No results found for: \"INR\"      "

## 2024-09-09 NOTE — PROGRESS NOTES
[facility]    Medicine Progress Note - Hospitalist Service    Date of Admission:  9/4/2024    Assessment & Plan   Ivan Webb is a markedly pleasant 69 year old gentleman with past medical history that is most notable for prior seizure, who presents with acute confusion and convulsive syncope. and is found to have suspected acute seizure and acute metabolic encephalopathy.     Suspected epilepsy  Acute metabolic encephalopathy  Mr. Webb was travelling back to his home in [place] on the day of admission. He recently quit smoking several weeks ago. It seems that he was hospitalized there in 2020 for acute confusion and bizarre behavior. It was documented that he was drinking 4-6 alcoholic beverages per day at that time. While hospitalized he had a seizure. Alcohol withdrawal and/or wernicke korsakoff syndrome were suspected. No further seizures have been known to have occurred before or since.  Now, he presented for evaluation of acute confusion, culminating in a witnessed episode of convulsive [place]ope at the airport. In the ED, he was afebrile, without hypotension, tachycardia, or hypoxia. WBC was normal, as was hepatic panel. He had mild acute metabolic acidosis and thrombocytopenia as discussed below. UA as well as UDS were negative and Ethyl alcohol level was undetectable. CT and CTA of head and neck showed no acute processes.   Overall, his symptoms seemed consistent with a seizure. He could have alcohol withdrawal or a post-viral process.  Registered to observation initially, then admitted to inpatient on 9/6/24.  Neurology consulted, appreciate their assistance.  EEG on 9/5/24 showed PLEDS, resolved around 7:00 pm on 9/5/24.  MRI brain on 9/5/24 showed no acute abnormality.  Loaded with keppra on 9/5/24, then started on keppra 750 mg PO BID.  Seizure precautions.  TSH and vitamin B12 within normal limits.  Ammonia level low at 13.  Multivitamin, folic acid, and  thiamine.  Monitor for signs of withdrawal, although suspect this is unlikely as patient's alcohol use has diminished significantly since 2020, now has one drink per day.  Discontinued Wellbutrin.  Discussed with neurology 09/08.  Continues with mild encephalopathy on EEG.  Continue with Keppra.    9/9: More confused.  Will get repeat neurology evaluation.  Continue to treat toxic metabolic causes.  Trial of IV thiamine    LBP: Age-indeterminate fractures at T7, T8, T9, and T12   -Will get MRI and neurosurgery review    Recent COVID-19 infection  Patient developed a cough, fever, and sneezes about 1 week prior to admission.  Wife subsequently developed the same symptoms.  COVID-19 PCR was positive on 9/5/2024.   not hypoxic.    Acute metabolic acidosis  Suspect due to hypovolemia: Given IV fluid in the ED.  Resolved after IV fluids.     Thrombocytopenia, acute- resolved   Mild, platelets 137 initially. Had been normal on 6/17/24.  Could be due to acute illness.  Platelets down to 118 on 9/5     Hyperlipidemia  Continue PTA atorvastatin.     Tobacco use  Has been on Wellbutrin for a while (patient and wife not sure exactly how long) for tobacco cessation.  They state he stopped smoking about 2 weeks ago.  stopped Wellbutrin as it can lower the seizure threshold.     History of prostate cancer  Status post prostactectomy.   Noted.        Diet: Regular Diet Adult    DVT Prophylaxis: Pneumatic Compression Devices  Kennedy Catheter: Not present  Lines: None     Cardiac Monitoring: None  Code Status: Full Code      Clinically Significant Risk Factors                                # Financial/Environmental Concerns: none             Disposition Plan     Medically Ready for Discharge: Anticipated in 2-4 Days         Estiven Randall MD  Hospitalist Service  [facility]  Securely message with [employer] (more info)  Text page via [employer]   The above note was dictated using voice recognition  software. Although reviewed after completion, some word and grammatical error may remain . Please contact the author for any clarifications.  ______________________________________________________________________    Interval History   More confused and fidgety  Continues with low back pain but mild and able to walk    Physical Exam   Vital Signs: Temp: 97.9  F (36.6  C) Temp src: Oral BP: (!) 163/88 Pulse: 72   Resp: 16 SpO2: 96 % O2 Device: None (Room air)    Weight: 160 lbs 0 oz    Gen- pleasant   HEENT- NAD, AIDA  Neck- supple  CVS- I+II+ no m/r/g  RS- no tachypnea  Abdo- soft, no tenderness .   Ext- no edema   CNS-as per neurology.  More confused today    Medical Decision Making       51 MINUTES SPENT BY ME on the date of service doing chart review, history, exam, documentation & further activities per the note.      Data   ------------------------- PAST 24 HR DATA REVIEWED -----------------------------------------------    I have personally reviewed the following data over the past 24 hrs:    7.2  \   14.2   / 205     135 99 14.0 /  93   3.6 25 0.79 \       Imaging results reviewed over the past 24 hrs:   No results found for this or any previous visit (from the past 24 hour(s)).

## 2024-09-09 NOTE — PLAN OF CARE
Goal Outcome Evaluation:    Shift Summary 9/8/[id number]    Admitting Diagnosis: Confusion [R41.0]  Seizure (H) [R56.9]  Bizarre behavior [R46.2]  Altered mental status, unspecified altered mental status type [R41.82]     Vitals - WNL  Pain 0/10. Taking N/A PRN. Last dose N/A  A&O to self, orientation waxes and wanes  Voiding - Continent  Mobility  - SBA when confused. Pt impulsive  Tele - SR               Neuro/CMS - Intact ex confusion  Lung Sounds clear/diminished on RA   GI - Continent. No BM this shift  Skin/Dressing - Scattered bruising  Drains: No IV access    Orders Placed This Encounter      Regular Diet Adult       Plan: Pending. Wife at bedside

## 2024-09-10 ENCOUNTER — APPOINTMENT (OUTPATIENT)
Dept: OCCUPATIONAL THERAPY | Facility: CLINIC | Age: 69
DRG: 100 | End: 2024-09-10
Attending: INTERNAL MEDICINE
Payer: MEDICARE

## 2024-09-10 LAB
CREAT SERPL-MCNC: 0.85 MG/DL (ref 0.67–1.17)
EGFRCR SERPLBLD CKD-EPI 2021: >90 ML/MIN/1.73M2
PLATELET # BLD AUTO: 214 10E3/UL (ref 150–450)

## 2024-09-10 PROCEDURE — 97530 THERAPEUTIC ACTIVITIES: CPT | Mod: GO

## 2024-09-10 PROCEDURE — 250N000013 HC RX MED GY IP 250 OP 250 PS 637: Performed by: PSYCHIATRY & NEUROLOGY

## 2024-09-10 PROCEDURE — 83519 RIA NONANTIBODY: CPT | Performed by: PSYCHIATRY & NEUROLOGY

## 2024-09-10 PROCEDURE — 99232 SBSQ HOSP IP/OBS MODERATE 35: CPT | Performed by: INTERNAL MEDICINE

## 2024-09-10 PROCEDURE — 250N000013 HC RX MED GY IP 250 OP 250 PS 637: Performed by: INTERNAL MEDICINE

## 2024-09-10 PROCEDURE — 97535 SELF CARE MNGMENT TRAINING: CPT | Mod: GO

## 2024-09-10 PROCEDURE — 250N000013 HC RX MED GY IP 250 OP 250 PS 637: Performed by: HOSPITALIST

## 2024-09-10 PROCEDURE — 36415 COLL VENOUS BLD VENIPUNCTURE: CPT | Performed by: PSYCHIATRY & NEUROLOGY

## 2024-09-10 PROCEDURE — 250N000011 HC RX IP 250 OP 636: Performed by: INTERNAL MEDICINE

## 2024-09-10 PROCEDURE — 120N000001 HC R&B MED SURG/OB

## 2024-09-10 PROCEDURE — 97165 OT EVAL LOW COMPLEX 30 MIN: CPT | Mod: GO

## 2024-09-10 PROCEDURE — 36415 COLL VENOUS BLD VENIPUNCTURE: CPT | Performed by: INTERNAL MEDICINE

## 2024-09-10 PROCEDURE — 82565 ASSAY OF CREATININE: CPT | Performed by: INTERNAL MEDICINE

## 2024-09-10 PROCEDURE — 85049 AUTOMATED PLATELET COUNT: CPT | Performed by: INTERNAL MEDICINE

## 2024-09-10 RX ORDER — LORAZEPAM 2 MG/ML
0.5 INJECTION INTRAMUSCULAR
Status: DISCONTINUED | OUTPATIENT
Start: 2024-09-10 | End: 2024-09-18 | Stop reason: HOSPADM

## 2024-09-10 RX ADMIN — THIAMINE HYDROCHLORIDE 500 MG: 100 INJECTION, SOLUTION INTRAMUSCULAR; INTRAVENOUS at 12:14

## 2024-09-10 RX ADMIN — ENOXAPARIN SODIUM 40 MG: 40 INJECTION SUBCUTANEOUS at 08:51

## 2024-09-10 RX ADMIN — MELATONIN TAB 3 MG 3 MG: 3 TAB at 21:21

## 2024-09-10 RX ADMIN — ATORVASTATIN CALCIUM 80 MG: 80 TABLET, FILM COATED ORAL at 08:50

## 2024-09-10 RX ADMIN — LEVETIRACETAM 1000 MG: 500 TABLET, FILM COATED ORAL at 21:21

## 2024-09-10 RX ADMIN — ACETAMINOPHEN 650 MG: 325 TABLET ORAL at 02:34

## 2024-09-10 RX ADMIN — THIAMINE HYDROCHLORIDE 500 MG: 100 INJECTION, SOLUTION INTRAMUSCULAR; INTRAVENOUS at 16:48

## 2024-09-10 RX ADMIN — LIDOCAINE 2 PATCH: 560 PATCH PERCUTANEOUS; TOPICAL; TRANSDERMAL at 08:51

## 2024-09-10 RX ADMIN — Medication 1 TABLET: at 08:50

## 2024-09-10 RX ADMIN — LEVETIRACETAM 1000 MG: 500 TABLET, FILM COATED ORAL at 08:50

## 2024-09-10 RX ADMIN — QUETIAPINE 12.5 MG: 25 TABLET, FILM COATED ORAL at 21:21

## 2024-09-10 RX ADMIN — ACETAMINOPHEN 650 MG: 325 TABLET ORAL at 21:27

## 2024-09-10 RX ADMIN — THIAMINE HYDROCHLORIDE 500 MG: 100 INJECTION, SOLUTION INTRAMUSCULAR; INTRAVENOUS at 21:34

## 2024-09-10 RX ADMIN — FOLIC ACID 1 MG: 1 TABLET ORAL at 08:50

## 2024-09-10 ASSESSMENT — ACTIVITIES OF DAILY LIVING (ADL)
ADLS_ACUITY_SCORE: 25
ADLS_ACUITY_SCORE: 30
ADLS_ACUITY_SCORE: 25
ADLS_ACUITY_SCORE: 25
ADLS_ACUITY_SCORE: 30
ADLS_ACUITY_SCORE: 25
ADLS_ACUITY_SCORE: 30
ADLS_ACUITY_SCORE: 25
ADLS_ACUITY_SCORE: 25
ADLS_ACUITY_SCORE: 30
ADLS_ACUITY_SCORE: 25
ADLS_ACUITY_SCORE: 30
ADLS_ACUITY_SCORE: 30

## 2024-09-10 NOTE — PROGRESS NOTES
"   09/10/24 1000   Appointment Info   Signing Clinician's Name / Credentials (OT) Aviva Aiden, OTR/L   Living Environment   People in Home spouse   Current Living Arrangements house   Living Environment Comments Pt lives w/ spouse in [place]. Reports he does not have steps to get into the home but there is to get to the mail box? Tub shower and walk in shower option but pt primairly uses tub shower. No AD at baseline for mobility.   Self-Care   Usual Activity Tolerance excellent   Current Activity Tolerance moderate   Equipment Currently Used at Home none   Fall history within last six months yes   Number of times patient has fallen within last six months 1  (at airport leading to admission)   Activity/Exercise/Self-Care Comment Pt is normally IND at baseline for ADLs.   Instrumental Activities of Daily Living (IADL)   IADL Comments IND at baseline, retired  (which could explain perseveration on numbers and measurements); does drive some, has wife support. Had similiar episode ~4 years ago which at that time they thought to be alcohol related, he was hospitalized for 1 month at that time.   General Information   Onset of Illness/Injury or Date of Surgery 09/04/24   Referring Physician Estiven Randall MD   Patient/Family Therapy Goal Statement (OT) (S)  Wife is really hopeful that he can travel back to [place] in the next few days otherwise she might have to go home and come back. It is not possible for her to drive him back as she does not feel comfortable with highway driving, especially long distances.   Additional Occupational Profile Info/Pertinent History of Current Problem Per chart review: \"Ivan Webb is a markedly pleasant 69 year old gentleman with past medical history that is most notable for prior seizure, who presents with acute confusion and convulsive syncope. and is found to have suspected acute seizure and acute metabolic encephalopathy.  \" Was at airport about to fly back " home when the event happened .   Limitations/Impairments safety/cognitive   Cognitive Status Examination   Orientation Status person;place   Behavioral Issues overwhelmed easily   Affect/Mental Status (Cognitive) anxious;confused   Follows Commands follows one-step commands;25-49% accuracy;delayed response/completion;increased processing time needed;initiation impaired;physical/tactile prompts required;repetition of directions required;verbal cues/prompting required   Safety Deficit moderate deficit;ability to follow commands;at risk behavior observed;awareness of need for assistance;insight into deficits/self-awareness;judgment;problem-solving   Memory Deficit moderate deficit;short-term memory;recall, recent events;working memory   Attention Deficit distractible in noisy environment;distractible in quiet environment;focused/sustained attention;selective attention;alternating attention;divided attention;restless when unoccupied  (perseverative lately on measuring items throughout the room)   Executive Function Deficit moderate deficit;abstract thinking;impulse control;information processing;initiation;insight/awareness of deficits;judgment;organization/sequencing;planning/decision-making;self-monitoring/self-correction   Cognitive Status Comments Pt w/ severe cognitive deficits at time of evaluation, unable to perform formal cognitive assessment 2/2 pts attention and command following. Very poor sequencing, pt perseverating on measuring things in the room; pt washing hands multiple times and also not having the correct sequence, needing physical assist and cues for simple BADL task of brushing teeth.   Cognitive Screens/Assessments   Cognitive Assessments Completed   (unable to perform formal assessment)   Visual Perception   Impact of Vision Impairment on Function (Vision) possibly some L inattention, he bumps into things on L side as if he did not see it (bed, door frame, garbage can etc.)   Sensory   Sensory  Comments inattention on L side but difficult to assess sensory vs inattention vs vision   Range of Motion Comprehensive   Comment, General Range of Motion BUE WFL   Strength Comprehensive (MMT)   Comment, General Manual Muscle Testing (MMT) Assessment BUE WFL, some trunk and postural correction weakness   Coordination   Coordination Comments coordination is intact but pt is distracted and difficulty w/ sequency   Bed Mobility   Comment (Bed Mobility) SBA-IND, bed flat and removed bed rails   Transfers   Transfers toilet transfer   Transfer Comments SBA-IND no AD   Toilet Transfer   Toilet Transfer Comments IND-SBA   Balance   Balance Comments no overt balance deficits, pt is just distracted and bumps into things occasionally   Activities of Daily Living   BADL Assessment/Intervention lower body dressing;toileting   Lower Body Dressing Assessment/Training   Comment, (Lower Body Dressing) IND   Toileting   Comment, (Toileting) Needs SBA for sequencing through task, taking brief off, doffing and donning etc. Poor sequencing.   Clinical Impression   Criteria for Skilled Therapeutic Interventions Met (OT) Yes, treatment indicated   OT Diagnosis impaired cognition and ADL/IADL IND   OT Problem List-Impairments impacting ADL problems related to;cognition   Assessment of Occupational Performance 1-3 Performance Deficits   Identified Performance Deficits All ADLs and IADLs impacted by sequencing and cognition   Planned Therapy Interventions (OT) ADL retraining;IADL retraining;cognition;home program guidelines;progressive activity/exercise   Clinical Decision Making Complexity (OT) detailed assessment/moderate complexity   OT Total Evaluation Time   OT Eval, Low Complexity Minutes (81015) 9   OT Goals   Therapy Frequency (OT) 5 times/week   OT Predicted Duration/Target Date for Goal Attainment 09/17/24   OT Goals Hygiene/Grooming;Cognition;Home Management;OT Goal 1;OT Goal 2   OT: Hygiene/Grooming supervision/stand-by  assist  (sequence through basic ADL g/h task)   OT: Home Management Supervision/stand-by assist;with light demand household tasks;ambulatory level   OT: Cognitive Patient/caregiver will verbalize understanding of cognitive assessment results/recommendations as needed for safe discharge planning   OT: Goal 1 Pt will demonstrate improved attention and restlessness required for 2 hour flight to return home.   OT: Goal 2 Pt will will demonstrate improved cognition and sequencing through basic common ADL tasks w/ supervision.   Interventions   Interventions Quick Adds Self-Care/Home Management;Therapeutic Activity   Self-Care/Home Management   Self-Care/Home Mgmt/ADL, Compensatory, Meal Prep Minutes (32673) 29   Treatment Detail/Skilled Intervention OT: Pt engaged in basic ADL sequencing task, SBA bed mobility, fixes socks siting EOB; ambulates to toilet in room w/ SBA-CGA, Mod I toilet tr, however when pt on toilet needs cues to locate toilet paper, was reaching for toilet paper castaneda and saying aloud measurements, once showed where toilet paper is, pt was able to wipe IND, Min A for doffing and donning brief, would reach down and adjust sock but multiple cues required to doff. Once standing at sink for g/h tasks pt washing hands multiple times but difficulty w/ terminating task then putting soap on hands and then wiping off with paper towel. Continues to have poor attention. Lengthy discussion w/ wife on phone about pts diffiuclty w/ sequencing and restlessness and likely poor tolerance for tolerating a flight.   Therapeutic Activities   Therapeutic Activity Minutes (81339) 12   Treatment Detail/Skilled Intervention OT: Pt engaged in functional mobility within the room for activity tolerance and gauge ability to manage home, room and community mobility. Pt also demonstrated supervision w/ step up and over curb step in room, walking >50' in room w/ poor attention, sitting down multiple times and diffiuclty w/  sequencing through multi step commands. Easily distracted.   OT Discharge Planning   OT Plan sequencing through basic ADL tasks, needs to tolerate >2hr flight to [place], attention, possibly get out of room w/ covid restrictions   OT Discharge Recommendation (DC Rec) home with assist;home with outpatient occupational therapy   OT Rationale for DC Rec Pt functioning severely below cognitive baseline. Expect pt will have 24h support from wife and discharge back to [place]. At this time primary concern is pts restlessness and attention to tolerate flight. Will need to follow up w/ OP OT and PCP when pt returns home.   OT Brief overview of current status Goals of therapy will be to address safe mobility and make recs for d/c to next level of care. Pt and RN will continue to follow all falls risk precautions as documented by RN staff while hospitalized  (Allow pt to pace around room and continue to distract him to keep busy and manage restlessness, SBA for mobility, VC and assist for sequencing all basic tasks)   Total Session Time   Timed Code Treatment Minutes 41   Total Session Time (sum of timed and untimed services) 50

## 2024-09-10 NOTE — PROGRESS NOTES
Cross coverage note: 4:20 AM-paged by RN because the patient not being able to sleep and requesting as needed medication; patient came with confusion/acute encephalopathy, followed by neurology.  He received melatonin 3 mg at 2140 last night: Would not give another sleeping aid that might interfere with his mental status at this time.    Olga Mcneal, Hospitalist

## 2024-09-10 NOTE — PLAN OF CARE
Goal Outcome Evaluation:  PT-OT screened for PT. Per OT patient doesn't need A for mobility. He has difficulty with sequencing but this is due to impaired cognition so no PT needs indicted.

## 2024-09-10 NOTE — PLAN OF CARE
"Chief Complaint / current diagnosis: admitted after seizure 9/4/2024 after a fall at the airport    RN Assessment & Course 7150-3511:    Neuro / Mentation / Psych: A&O to self. Reoriented patient to situation, time, date. Confused and PRN ativan given for agitation prior to MRI this evening. Seizure precautions maintained. Bedside attendant/sitter for safety. Q8 neuro checks.  Aggression Stoplight: yellow  Vitals / Pain: VSS on RA, robaxin and tylenol for chronic back pain  CV: stable HR/BP; sinus rhythm  LS: clear, diminished  Activity: x-1/standby assist, PT/OT  GI/: continent  Diet/nutrition: regular diet; takes pills whole  Access: PIV C/D/I  Diagnostics: MRI this evening  Patient/Staff Safety: bed in low/locked position, call light in reach, ID band on, all appropriate equipment/monitors on & audible.  Isolation: COVID+    _____________________________________________________    Electronically signed by  Ivy Vasquez, RN-BSN  [facility] Nurse    Goal Outcome Evaluation:      Plan of Care Reviewed With: patient    Overall Patient Progress: no change    Problem: Adult Inpatient Plan of Care  Goal: Plan of Care Review  Description: The Plan of Care Review/Shift note should be completed every shift.  The Outcome Evaluation is a brief statement about your assessment that the patient is improving, declining, or no change.  This information will be displayed automatically on your shift  note.  Outcome: Progressing  Flowsheets (Taken 9/9/2024 1914)  Plan of Care Reviewed With: patient  Overall Patient Progress: no change  Goal: Patient-Specific Goal (Individualized)  Description: You can add care plan individualizations to a care plan. Examples of Individualization might be:  \"Parent requests to be called daily at 9am for status\", \"I have a hard time hearing out of my right ear\", or \"Do not touch me to wake me up as it startles  me\".  Outcome: Progressing  Goal: Absence of " Hospital-Acquired Illness or Injury  Outcome: Progressing  Intervention: Identify and Manage Fall Risk  Recent Flowsheet Documentation  Taken 9/9/2024 1900 by Ivy Vasquez RN  Safety Promotion/Fall Prevention:   activity supervised   bedside attendant   clutter free environment maintained   increased rounding and observation   room near nurse's station   room organization consistent   safety round/check completed   supervised activity   treat underlying cause  Intervention: Prevent Skin Injury  Recent Flowsheet Documentation  Taken 9/9/2024 1900 by Ivy Vasquez RN  Body Position: position changed independently  Skin Protection: adhesive use limited  Device Skin Pressure Protection: adhesive use limited  Taken 9/9/2024 1700 by Ivy Vasquez RN  Body Position: position changed independently  Taken 9/9/2024 1500 by Ivy Vasquez RN  Body Position: position changed independently  Intervention: Prevent Infection  Recent Flowsheet Documentation  Taken 9/9/2024 1900 by Ivy Vasquez RN  Infection Prevention:   hand hygiene promoted   rest/sleep promoted   personal protective equipment utilized  Goal: Optimal Comfort and Wellbeing  Outcome: Progressing  Goal: Readiness for Transition of Care  Outcome: Progressing     Problem: Fall Injury Risk  Goal: Absence of Fall and Fall-Related Injury  Outcome: Progressing  Intervention: Identify and Manage Contributors  Recent Flowsheet Documentation  Taken 9/9/2024 1900 by Ivy Vasquez RN  Medication Review/Management: medications reviewed  Intervention: Promote Injury-Free Environment  Recent Flowsheet Documentation  Taken 9/9/2024 1900 by Ivy Vasquez RN  Safety Promotion/Fall Prevention:   activity supervised   bedside attendant   clutter free environment maintained   increased rounding and observation   room near nurse's station   room organization consistent   safety round/check completed   supervised activity   treat underlying cause     Problem:  Seizure, Active Management  Goal: Absence of Seizure/Seizure-Related Injury  Outcome: Progressing  Intervention: Prevent Seizure-Related Injury  Recent Flowsheet Documentation  Taken 9/9/2024 1900 by Ivy Vasquez RN  Seizure Precautions:   activity supervised   clutter-free environment maintained     Problem: Risk for Delirium  Goal: Optimal Coping  Outcome: Progressing  Intervention: Optimize Psychosocial Adjustment to Delirium  Recent Flowsheet Documentation  Taken 9/9/2024 1900 by Ivy Vasquez RN  Supportive Measures:   active listening utilized   relaxation techniques promoted  Goal: Improved Behavioral Control  Outcome: Progressing  Intervention: Prevent and Manage Agitation  Recent Flowsheet Documentation  Taken 9/9/2024 1900 by Ivy Vasquez RN  Environment Familiarity/Consistency: daily routine followed  Intervention: Minimize Safety Risk  Recent Flowsheet Documentation  Taken 9/9/2024 1900 by Ivy Vasquez RN  Communication Enhancement Strategies: repetition utilized  Enhanced Safety Measures: room near unit station  Goal: Improved Attention and Thought Clarity  Outcome: Progressing  Intervention: Maximize Cognitive Function  Recent Flowsheet Documentation  Taken 9/9/2024 1900 by Ivy Vasquez RN  Sensory Stimulation Regulation:   care clustered   lighting decreased   quiet environment promoted  Reorientation Measures:   calendar in view   clock in view   reorientation provided  Goal: Improved Sleep  Outcome: Progressing  Intervention: Promote Sleep  Recent Flowsheet Documentation  Taken 9/9/2024 1900 by Ivy Vasquez RN  Sleep/Rest Enhancement: consistent schedule promoted

## 2024-09-10 NOTE — PROGRESS NOTES
[facility]    Neurosurgery  Daily Note    Assessment & Plan       24 hour events: No acute events overnight.     AM Rounds:  Patient evaluated with hospitalist and nursing at bedside. Improved confusion. Ongoing mild lower back pain, history of chronic back pain. Denies radicular pain, paresthesias, acute bowel/bladder dysfunction. BLE strength 5/5, sensation intact BLE, nontender to palpation of thoracic and lumbar spine and paraspinous muscles. Thoracic MRI completed yesterday with sedation medication; however imaging is limited due to motion and no STIR images. Significant for T3, T4, T5, T6, T7, T8, T9, T12 endplate fractures without high grade canal or foraminal stenosis. Per chart review, thoracic XR 2020 noted stable T5, T7 T8 compression fractures. DEXA 2022 noted osteopenia.     Imaging:  Imaging Interpretation provided by radiologist.   IMPRESSION:     1.  Incomplete examination, terminated early due to excessive patient motion. Sagittal STIR sequence was not obtained.  2.  Superior endplate fractures at T3, T4, T5, T6, T7, T8, T9 and T12. Height loss is greatest and approximately 50% at T4. No definite marrow edema at any levels, though evaluation is substantially limited in the absence of sagittal STIR sequence. If   there is persistent clinical concern, suggest repeat study with formal sedation.  3.  No evidence for high-grade spinal canal or neural foraminal stenosis.  4.  No definite cord signal abnormality.      Plan:  Thoracic fractures likely chronic in nature clinically. Recommend patient follow up with PCP in [place] if ongoing or worsening symptoms.    No bracing necessary.     NSGY signing off, please page or reconsult for questions or concerns.     Plans discussed with Dr. Gutiérrez who was in agreement with plans.    Tanya Mcnally PA-C  [facility]  [address] [place] [address]  [address] [place] [id number]      Physical Exam   Temp: 97.3  F (36.3   C) Temp src: Oral BP: 138/86 Pulse: 77   Resp: 16 SpO2: 96 % O2 Device: None (Room air)    Vitals:    09/04/24 2100   Weight: 72.6 kg (160 lb)     Vital Signs with Ranges  Temp:  [97.3  F (36.3  C)-98  F (36.7  C)] 97.3  F (36.3  C)  Pulse:  [65-78] 77  Resp:  [16] 16  BP: (117-138)/(78-98) 138/86  SpO2:  [93 %-98 %] 96 %  I/O last 3 completed shifts:  In: 740 [P.O.:740]  Out: -     Sitting up in hospital chair with nursing at bedside.   Awake, alert, and appropriate. NAD  Oriented to self, location   BLE strength 5/5  Sensation intact BLE  Nontender to palpation thoracic and lumbar spine and paraspinous muscles.     Medications   Current Facility-Administered Medications   Medication Dose Route Frequency Provider Last Rate Last Admin      Current Facility-Administered Medications   Medication Dose Route Frequency Provider Last Rate Last Admin    atorvastatin (LIPITOR) tablet 80 mg  80 mg Oral Daily Estiven Randall MD   80 mg at 09/09/24 0846    [Held by provider] buPROPion (WELLBUTRIN XL) 24 hr tablet 150 mg  150 mg Oral QAM Estiven Randall MD        enoxaparin ANTICOAGULANT (LOVENOX) injection 40 mg  40 mg Subcutaneous Q24H Estiven Randall MD   40 mg at 09/09/24 0847    folic acid (FOLVITE) tablet 1 mg  1 mg Oral Daily Estiven Randall MD   1 mg at 09/09/24 0846    levETIRAcetam (KEPPRA) tablet 1,000 mg  1,000 mg Oral BID Shar Basilio MD   1,000 mg at 09/09/24 2140    Lidocaine (LIDOCARE) 4 % Patch 2 patch  2 patch Transdermal Q24H Kerline Fraire MD   2 patch at 09/09/24 0847    multivitamin w/minerals (THERA-VIT-M) tablet 1 tablet  1 tablet Oral Daily Estiven Randall MD   1 tablet at 09/09/24 0847    thiamine (B-1) injection 500 mg  500 mg Intravenous TID Estiven Randall MD   500 mg at 09/09/24 2140    Followed by    [START ON 9/11/2024] thiamine (B-1) injection 250 mg  250 mg Intravenous Daily Estiven Randall MD        Followed by    [START ON 9/16/2024] thiamine (B-1) tablet 100 mg  100 mg Oral Daily  Estiven Randall MD

## 2024-09-10 NOTE — PROVIDER NOTIFICATION
MD Notification    Notified Person: MD    Notified Person Name:  Elizabet    Notification Date/Time: 0403, 9-10-24    Notification Interaction: vocera    Purpose of Notification: Pt not able to sleep can I get a PRN?    Orders Received: He came with confusion, cannot give anything else for now to not interfere with mental status    Comments:

## 2024-09-10 NOTE — PLAN OF CARE
Pt here with fall, seizures, and encephalopathy. COVID+. A&O x2-3. Neuros confused and generalized weakness. VSS, SBP<180. Regular diet, thin liquids. Takes pills whole. Up with Ax1 GBW. Denies pain. Sitter at bedside. Pt scoring yellow on the Aggression Stop Light Tool for restlessness/ impulsivity. Plan LP. Discharge pending.

## 2024-09-10 NOTE — PLAN OF CARE
Goal Outcome Evaluation:         Shift: 9-10-24, [id number]  Summary: had a seizure and fall  Cognition: A/O x 2 not sit/time  Aggression Stoplight Tool: green  Vitals: VSS on RA  Pain: tylenol x 1  Activity: A x 1 gb/w  Diet: reg  Bladder/bowel: continent up to BR  Labs/BG:  Tele: NSR  IV: SL  Consults: nuerology  Discharge: TBD pending medical status  Other:  Covid precautions maintained. No nausea, SOB. Lung sounds clear. Bowel sounds active. CMS intact. Continue to monitor.

## 2024-09-10 NOTE — PROGRESS NOTES
"  Neurology Progress Note               Interval History:     Patient is sitting comfortably in the chair and denies any pain or significant complaints.  He thinks that he is in [place] he does not know the name of the hospital or the city.  He reports that he hears people talking to him.  He is able to follow commands with repetition.              Medications:   I have reviewed this patient's current medications               Physical Exam:   Blood pressure 138/86, pulse 77, temperature 97.3  F (36.3  C), temperature source Oral, resp. rate 16, height 1.727 m (5' 8\"), weight 72.6 kg (160 lb), SpO2 96%.  GENERAL EXAMINATION:  HEENT: PERRLA, EOMI.    NEUROLOGICAL EXAMINATION  Mental Status:  Patient is awake, alert, but disoriented to time and place.  He knows the month of September and year 2024.  He could not touch as left ear with the right hand on command.  He could not differentiate his right hand from left hand.  He could name and repeat.    Cranial Nerves: Pupils are equal and reactive to light.Visual fields are full to confrontation. Extraocular movements are intact. There is no nystagmus in the horizontal or vertical planes.No facial sensory deficits. No facial weakness. The tongue is midline.     Motor: Muscle tone is normal. There is no pronator drift. There is no muscle atrophy. The strength is 5/5 in upper and lower extremities bilaterally. Deep tendon reflexes are 2+ and symmetric in  biceps, triceps, knees, and ankles. Plantars are flexor. .     Coordination: .Finger to nose - normal.         Data:     Review of Diagnostics:     I personally reviewed and interpreted the following:    MR Thoracic Spine w/o Contrast    Result Date: 9/9/2024  EXAM: MR THORACIC SPINE W/O CONTRAST LOCATION: [facility] DATE: 9/9/2024 INDICATION: T7  T12 fractures COMPARISON: Thoracic spine radiographs: 9/5/2024. Head/neck CTA: 9/4/2024. TECHNIQUE: Routine Thoracic Spine MRI without IV contrast. " FINDINGS: Incomplete examination, terminated early due to excessive patient motion. Sagittal STIR sequence was not obtained. Superior endplate fractures at T3, T4, T5, T6, T7, T8, T9, and T12. Height loss is greatest and approximately 50% at T4. No definite marrow edema, though evaluation is limited in the absence of sagittal STIR sequence. Mild multilevel degenerative disc disease without findings to suggest high-grade spinal canal or neural foraminal stenosis within constraints of motion degradation. No definite cord signal abnormality within constraints of motion. No definite extraspinal abnormality.     IMPRESSION: 1.  Incomplete examination, terminated early due to excessive patient motion. Sagittal STIR sequence was not obtained. 2.  Superior endplate fractures at T3, T4, T5, T6, T7, T8, T9 and T12. Height loss is greatest and approximately 50% at T4. No definite marrow edema at any levels, though evaluation is substantially limited in the absence of sagittal STIR sequence. If there is persistent clinical concern, suggest repeat study with formal sedation. 3.  No evidence for high-grade spinal canal or neural foraminal stenosis. 4.  No definite cord signal abnormality.    EEG Video 12-26 hr Unmonitored    Result Date: 9/8/2024  EEG Video 12-26 hr Unmonitored Result VIDEO EEG DATE: 9/7/24 VIDEO EEG LOG: [facility], [facility]–1983  LTV VIDEO EEG DAY#: 2 VIDEO EEG SOURCE FILE DURATION: 19 HRS AND 10 MIN PATIENT INFORMATION: Ivan Webb is a 69 year old year old male who presents with episodic confusion. MEDICATIONS: Reviewed-See electronic health record for list of medications administered on the day of this recording.  TECHNICAL SUMMARY: EEG was recorded from 24 MRI COMP. WIRES scalp electrodes placed according to the 10-20 international system. Additional electrodes were used for referencing, EKG, and to record from other cerebral regions as appropriate. Video was continuously recorded and was reviewed for clinical  correlation. Electrodes were attached and both video and EEG were monitored and annotated by qualified EEG technologists. Video-EEG was reviewed and report generated by qualified physician. BACKGROUND ACTIVITY:  During wakefulness, the background activity consists of synchronous and symmetric, well-modulated, 10 Hz posterior dominant rhythm. The posterior dominant rhythm attenuated with eye opening. During drowsiness, the background activity waxed and waned and there were periods of slowing and attenuation of the posterior alpha rhythm. ACTIVATION PROCEDURE: None. IMPRESSION OF VIDEO EEG DAY # 2: This video electroencephalogram is abnormal due to the presence of generalized intermittent slowing. This is a mildly abnormal awake and sleep video electroencephalogram which shows generalized slowing intermittently, pointing towards a mild encephalopathic etiology. No electrographic seizures or epileptiform discharges were recorded. Clinical correlation is advised. Isauro Jaime MD [facility] Office phone [phone]    EEG Video 12-26 hr Unmonitored    Result Date: 9/8/2024  EEG Video 12-26 hr Unmonitored Result VIDEO EEG DATE: 9/8/24 VIDEO EEG LOG: [facility], later [facility]  LTV VIDEO EEG DAY#: 3 VIDEO EEG SOURCE FILE DURATION: 22 HRS AND 29 MIN PATIENT INFORMATION: Ivan Webb is a 69 year old year old male who presents with confusional episodes. MEDICATIONS: Reviewed-See electronic health record for list of medications administered on the day of this recording.  TECHNICAL SUMMARY: EEG was recorded from 24 MRI COMP. WIRES scalp electrodes placed according to the 10-20 international system. Additional electrodes were used for referencing, EKG, and to record from other cerebral regions as appropriate. Video was continuously recorded and was reviewed for clinical correlation. Electrodes were attached and both video and EEG were monitored and annotated by qualified EEG technologists. Video-EEG was reviewed and report  generated by qualified physician. BACKGROUND ACTIVITY:  During wakefulness, the background activity consists of synchronous and symmetric, well-modulated,  10-11 Hz posterior dominant rhythm. The posterior dominant rhythm attenuated with eye opening. During drowsiness, the background activity waxed and waned and there were periods of slowing and attenuation of the posterior alpha rhythm.  Normal sleep patterns were recorded in which synchronous and symmetrical spindles, slow waves and K complexes were identified.  ACTIVATION PROCEDURE: None. IMPRESSION OF VIDEO EEG DAY # 3: This video electroencephalogram is abnormal due to the presence of generalized intermittent slowing.  However, no clear PLEDs, sharp and slow wave complexes, or other epileptiform patterns are identified. This is a mildly abnormal awake and sleep video electroencephalogram due to the presence of generalized encephalopathic patterns. No electrographic seizures or epileptiform discharges were recorded. Clinical correlation is advised. Isauro Jaime MD [facility]rology Office phone [phone]    EEG Video 12-26 hr Unmonitored    Result Date: 9/7/2024  EEG Video 12-26 hr Unmonitored Result VIDEO EEG DATE: 9/6/24 VIDEO EEG LOG: [id number]   LTV VIDEO EEG DAY#: 1 VIDEO EEG SOURCE FILE DURATION: 21 hour and 15 min PATIENT INFORMATION: Ivan Webb is a 69 year old year old male who presents with seizures. MEDICATIONS: Reviewed-See electronic health record for list of medications administered on the day of this recording.  TECHNICAL SUMMARY: EEG was recorded from 24MRI scalp electrodes placed according to the 10-20 international system. Additional electrodes were used for referencing, EKG, and to record from other cerebral regions as appropriate. Video was continuously recorded and was reviewed for clinical correlation. Electrodes were attached and both video and EEG were monitored and annotated by qualified EEG technologists. Video-EEG was  reviewed and report generated by qualified physician. BACKGROUND ACTIVITY:  During wakefulness, the background activity consists of synchronous and symmetric, well-modulated, 10 Hz posterior dominant rhythm. The posterior dominant rhythm attenuated with eye opening. During drowsiness, the background activity waxed and waned and there were periods of slowing and attenuation of the posterior alpha rhythm. Normal sleep structures were recorded during sleep. ACTIVATION PROCEDURE: Photic. IMPRESSION OF VIDEO EEG during different phases: DAY # 1: 9/5/2024: PLEDS noted in the right> left frontal leads, most prominently in the F4/C4 leads. Corresponding garbled speech and confusion noted. This pattern continues from beginning of recording until 630 pm on 9/5/2024. Day 1-2 (9/5-9/6): EEG normalizes with very rare right frontal sharp waves, but no rhythmic slowing or associated clinical abnormalities. This pattern continues until about 3 pm on day #2 (9/6/2024).  Day 2 (9/6/2024): 7pm-8pm: EEG restarted secondary to patient's clinical status changes. Record shows frequent PLEDS and spike-wave activity in the right > left frontal regions. Video correlation shows confusion. Day 2-3 8pm-11 am: EEG normalizes again. Patient conversational/ sleeping. Corresponding EEG changes noted. Day 3: 11AM- 4pm: EEG appears normal without PLEDS or spike wave discharges noted previously. Conclusion: This EEG shows PLEDS/ spine and wave activity with alternating long phases of  normal awake and sleep video electroencephalogram recordings. Clinical correlation is advised. Isauro Jaime MD Neurologist, [facility] September 7, 2024 11:02 AM      XR Thoracic Lumbar Standing 2 Views    Result Date: 9/5/2024  EXAM: XR THORACIC LUMBAR STANDING 2 VIEWS LOCATION: [facility] DATE: 9/5/2024 INDICATION: LBP. r o fracture COMPARISON: None.     IMPRESSION: Age-indeterminate fractures at T7, T8, T9, and T12.  Normal alignment. Mild multilevel disc height loss and facet hypertrophy. Aortic atherosclerotic versus.     EEG Awake or Drowsy Routine    Result Date: 9/5/2024  Table formatting from the original result was not included. Electroencephalogram report [facility]      [September 5, 2024] Technical details: Routine, surface electrode EEG, performed using 18 channels of digitally acquired EEG, with additional channels for EKG and eye leads. Standard international 10 - 20 system employed for electrode placement.  Video monitoring was not performed. Special technical modifications: None. Duration of study: 23 min. Patient arousal states studied: Awake, resting, drowsy, asleep. Activation procedures performed: Photic stimulation. Waveform description: Predominant background during the record reveals well formed, 8-9Hz alpha rhythm, noted symmetrically, and blocking well with eye opening.  Brief instances of bifrontal, sharply contoured semirhythmic polymorphic delta/theta discharges are noted that subside into a fairly normal appearing EEG about 3 minutes into the record.  These slow waves are not noted again. Otherwise, we do not notice any unusual laterality, spike or sharp waves or other abnormal structures. An analysis of waveforms during photic stimulation revealed no significant changes. Impression: This is a mildly abnormal standard electroencephalogram showing no clear electroencephalographic epileptiform activity, but with some semirhythmic bifrontal slowing.  This finding could point towards a generalized metabolic/infectious encephalopathy, but could also be indicative of a postictal phase.  A longer duration EEG or clinical correlation would be required for definitive interpretation. Isuaro Jaime MD Neurologist, [facility] September 5, 2024 11:33 AM       MR Brain w/o Contrast    Result Date: 9/5/2024  EXAM: MR BRAIN W/O CONTRAST LOCATION: [facility] DATE: 9/5/2024 INDICATION: AMS, confusion, seizure. COMPARISON: CTA head and neck 9/4/2024. TECHNIQUE: Routine multiplanar multisequence head MRI without intravenous contrast. FINDINGS: INTRACRANIAL CONTENTS: No acute or subacute infarct. No mass, acute hemorrhage, or extra-axial fluid collections. Mild volume loss and presumed chronic small vessel ischemia. Normal position of the cerebellar tonsils. SELLA: No abnormality accounting for technique. OSSEOUS STRUCTURES/SOFT TISSUES: Normal marrow signal. The major intracranial vascular flow voids are maintained. ORBITS: No abnormality accounting for technique. SINUSES/MASTOIDS: No paranasal sinus mucosal disease. No middle ear or mastoid effusion.     IMPRESSION: 1.  No acute intracranial abnormality. 2.  Age-related and chronic ischemic changes.    CTA Head Neck w Contrast    Result Date: 9/4/2024  EXAM: CTA HEAD NECK W CONTRAST LOCATION: [facility] DATE: 9/4/2024 INDICATION: seizure, fall, confusion COMPARISON: None. CONTRAST: 67mL Isovue 370 TECHNIQUE: Head and neck CT angiogram with IV contrast. Axial helical CT images of the head and neck vessels obtained during the arterial phase of intravenous contrast administration. Axial 2D reconstructed images and multiplanar 3D MIP reconstructed images of the head and neck vessels were performed by the technologist. Dose reduction techniques were used. All stenosis measurements made according to NASCET criteria unless otherwise specified. FINDINGS: HEAD CTA: ANTERIOR CIRCULATION: Mild atherosclerotic changes cavernous and supraclinoid ICAs bilaterally. Standard [place] anatomy. POSTERIOR CIRCULATION: No stenosis/occlusion, aneurysm, or high flow vascular malformation. Dominant left and smaller right vertebral artery contribute to a normal basilar artery. DURAL VENOUS SINUSES: Expected enhancement of the major dural venous sinuses. NECK CTA: RIGHT CAROTID: Less  "than 50% narrowing. LEFT CAROTID: Less than 50% narrowing. VERTEBRAL ARTERIES: Mild narrowing at the origin of both vertebral arteries. Both vertebral arteries appear otherwise normal. Dominant left and smaller right vertebral arteries. AORTIC ARCH: Moderate narrowing at origin/proximal aspect of both subclavian arteries. NONVASCULAR STRUCTURES: Mild compression of T3 and moderate compression of T4, age indeterminate; appear chronic.     IMPRESSION: HEAD CTA: 1.  Mild atherosclerotic changes cavernous and supraclinoid ICAs bilaterally. 2.  Intracranial circulation appears otherwise normal. NECK CTA: 1.  No definite hemodynamically significant narrowing throughout major neck vessels.     CT Head w/o Contrast    Result Date: 9/4/2024  EXAM: CT HEAD W/O CONTRAST LOCATION: [facility] DATE: 9/4/2024 INDICATION: seizure, fall, confusion COMPARISON: None. TECHNIQUE: Routine CT Head without IV contrast. Multiplanar reformats. Dose reduction techniques were used. FINDINGS: INTRACRANIAL CONTENTS: No intracranial hemorrhage, extraaxial collection, or mass effect.  No CT evidence of acute infarct. Mild presumed chronic small vessel ischemic changes. Mild generalized volume loss. No hydrocephalus. VISUALIZED ORBITS/SINUSES/MASTOIDS: No intraorbital abnormality. Minimal amount of fluid left maxillary sinus. No middle ear or mastoid effusion. BONES/SOFT TISSUES: No acute abnormality.     IMPRESSION: 1.  No acute intracranial process.        Vitamin B12:   Lab Results   Component Value Date    B12 808 09/04/2024         Complete Blood Count:    Recent Labs   Lab Test 09/10/24  0814 09/09/24  0716 09/06/24  1106 09/05/24  0659 09/04/24  2107   WBC  --  7.2 8.6 7.2 7.3   RBC  --  4.22* 4.08* 4.06* 4.14*   HGB  --  14.2 13.3 13.7 13.9   HCT  --  39.5* 38.5* 38.1* 38.8*    205 155 118* 137*   LYMPH  --   --   --  25 21        HgA1c: No results found for: \"A1C\"     Thyroid Stimulating Hormone:   Lab " Results   Component Value Date    TSH 2.38 09/04/2024        Recent Labs   Lab Test 09/10/24  0814 09/09/24  0716 09/06/24  1106   WBC  --  7.2 8.6   HGB  --  14.2 13.3   MCV  --  94 94    205 155      Recent Labs   Lab Test 09/10/24  0814 09/09/24  0716 09/07/24  1103 09/06/24  1106   NA  --  135  --  138   POTASSIUM  --  3.6  --  3.5   CHLORIDE  --  99  --  101   CO2  --  25  --  27   BUN  --  14.0  --  13.3   CR 0.85 0.79   < > 0.88   ANIONGAP  --  11  --  10   ANA  --  9.4  --  9.3   GLC  --  93  --  74    < > = values in this interval not displayed.     CMP:  Recent Labs   Lab 09/04/24  2107   PROTTOTAL 7.4   ALBUMIN 4.4   ALKPHOS 69   AST 39   ALT 23   BILITOTAL 0.9            Assessment and Plan:     Confusion: Patient had difficulty in following certain complex commands.  He has disorientation to time and place.  Patient has unexplained neuropsychiatric syndrome which could be related to autoimmune encephalitis versus underlying psychiatric disorder.  I will recommend obtaining a spinal tap to evaluate any evidence of elevated CSF protein and white cell counts.  I will also recommend checking autoimmune encephalitis panel.  Continue current dose of Keppra.       Parish Russo MD  [facility]

## 2024-09-10 NOTE — PROGRESS NOTES
[facility]    Medicine Progress Note - Hospitalist Service    Date of Admission:  9/4/2024    Assessment & Plan   Ivan Webb is a markedly pleasant 69 year old gentleman with past medical history that is most notable for prior seizure, who presents with acute confusion and convulsive syncope. and is found to have suspected acute seizure and acute metabolic encephalopathy.     Suspected epilepsy  Acute metabolic encephalopathy  Mr. Webb was travelling back to his home in [place] on the day of admission. He recently quit smoking several weeks ago. It seems that he was hospitalized there in 2020 for acute confusion and bizarre behavior. It was documented that he was drinking 4-6 alcoholic beverages per day at that time. While hospitalized he had a seizure. Alcohol withdrawal and/or wernicke korsakoff syndrome were suspected. No further seizures have been known to have occurred before or since.  Now, he presented for evaluation of acute confusion, culminating in a witnessed episode of conv[place]ope at the airport. In the ED, he was afebrile, without hypotension, tachycardia, or hypoxia. WBC was normal, as was hepatic panel. He had mild acute metabolic acidosis and thrombocytopenia as discussed below. UA as well as UDS were negative and Ethyl alcohol level was undetectable. CT and CTA of head and neck showed no acute processes.   Overall, his symptoms seemed consistent with a seizure. He could have alcohol withdrawal or a post-viral process.  Registered to observation initially, then admitted to inpatient on 9/6/24.  Neurology consulted, appreciate their assistance.  EEG on 9/5/24 showed PLEDS, resolved around 7:00 pm on 9/5/24.  MRI brain on 9/5/24 showed no acute abnormality.  Loaded with keppra on 9/5/24, then started on keppra 750 mg PO BID.  Seizure precautions.  TSH and vitamin B12 within normal limits.  Ammonia level low at 13.  Multivitamin, folic acid, and  thiamine.  Monitor for signs of withdrawal, although suspect this is unlikely as patient's alcohol use has diminished significantly since 2020, now has one drink per day.  Discontinued Wellbutrin.  Discussed with neurology 09/08.  Continues with mild encephalopathy on EEG.  Continue with Keppra.    9/9: More confused.  Appreciate neurology evaluation.  Continue to treat toxic metabolic causes.  Continue trial of IV thiamine: Some clinical improvement 09/10  -Will monitor during the day, may consider low-dose Seroquel if required    LBP: Age-indeterminate fractures at T7, T8, T9, and T12   -MRI from 09/09 reviewed.  Await further neurosurgery recommendations considering a lot of movement     Recent COVID-19 infection  Patient developed a cough, fever, and sneezes about 1 week prior to admission.  Wife subsequently developed the same symptoms.  COVID-19 PCR was positive on 9/5/2024.   not hypoxic.    Acute metabolic acidosis  Suspect due to hypovolemia: Given IV fluid in the ED.  Resolved after IV fluids.     Thrombocytopenia, acute- resolved   Mild, platelets 137 initially. Had been normal on 6/17/24.  Could be due to acute illness.  Platelets down to 118 on 9/5     Hyperlipidemia  Continue PTA atorvastatin.     Tobacco use  Has been on Wellbutrin for a while (patient and wife not sure exactly how long) for tobacco cessation.  They state he stopped smoking about 2 weeks ago.  stopped Wellbutrin as it can lower the seizure threshold.     History of prostate cancer  Status post prostactectomy.   Noted.        Diet: Regular Diet Adult    DVT Prophylaxis: Pneumatic Compression Devices  Kennedy Catheter: Not present  Lines: None     Cardiac Monitoring: None  Code Status: Full Code      Clinically Significant Risk Factors                                # Financial/Environmental Concerns: none             Disposition Plan     Medically Ready for Discharge: Anticipated Tomorrow         Estiven Randall MD  Hospitalist Service  [facility]  [facility]  Securely message with [employer] (more info)  Text page via [employer] Paging/Directory   The above note was dictated using voice recognition software. Although reviewed after completion, some word and grammatical error may remain . Please contact the author for any clarifications.  ______________________________________________________________________    Interval History   Feels a bit better today.  Knows he is in [place]  Minimal back pain.  But denies any tingling/numbness or weakness or incontinence    Physical Exam   Vital Signs: Temp: 97.3  F (36.3  C) Temp src: Oral BP: 138/86 Pulse: 77   Resp: 16 SpO2: 96 % O2 Device: None (Room air)    Weight: 160 lbs 0 oz    Gen- pleasant   HEENT- NAD, AIDA  Neck- supple  CVS- I+II+ no m/r/g  RS- no tachypnea  Abdo- soft, no tenderness .   Ext- no edema   CNS-awake all 4 limbs.  Knows his name, [place]  Can count 10-1 backwards    Medical Decision Making       51 MINUTES SPENT BY ME on the date of service doing chart review, history, exam, documentation & further activities per the note.  Discussion with patient, , OT, review of neurology note, discussion with neurosurgery    Data   ------------------------- PAST 24 HR DATA REVIEWED -----------------------------------------------    I have personally reviewed the following data over the past 24 hrs:    N/A  \   N/A   / 214     N/A N/A N/A /  N/A   N/A N/A 0.85 \       Imaging results reviewed over the past 24 hrs:   Recent Results (from the past 24 hour(s))   MR Thoracic Spine w/o Contrast    Narrative    EXAM: MR THORACIC SPINE W/O CONTRAST  LOCATION: [facility]  DATE: 9/9/2024    INDICATION: T7  T12 fractures  COMPARISON: Thoracic spine radiographs: 9/5/2024. Head/neck CTA: 9/4/2024.  TECHNIQUE: Routine Thoracic Spine MRI without IV contrast.    FINDINGS:     Incomplete examination, terminated early due to excessive patient motion. Sagittal STIR  sequence was not obtained.    Superior endplate fractures at T3, T4, T5, T6, T7, T8, T9, and T12. Height loss is greatest and approximately 50% at T4. No definite marrow edema, though evaluation is limited in the absence of sagittal STIR sequence.    Mild multilevel degenerative disc disease without findings to suggest high-grade spinal canal or neural foraminal stenosis within constraints of motion degradation.    No definite cord signal abnormality within constraints of motion.    No definite extraspinal abnormality.      Impression    IMPRESSION:    1.  Incomplete examination, terminated early due to excessive patient motion. Sagittal STIR sequence was not obtained.  2.  Superior endplate fractures at T3, T4, T5, T6, T7, T8, T9 and T12. Height loss is greatest and approximately 50% at T4. No definite marrow edema at any levels, though evaluation is substantially limited in the absence of sagittal STIR sequence. If   there is persistent clinical concern, suggest repeat study with formal sedation.  3.  No evidence for high-grade spinal canal or neural foraminal stenosis.  4.  No definite cord signal abnormality.

## 2024-09-11 ENCOUNTER — APPOINTMENT (OUTPATIENT)
Dept: OCCUPATIONAL THERAPY | Facility: CLINIC | Age: 69
DRG: 100 | End: 2024-09-11
Attending: INTERNAL MEDICINE
Payer: MEDICARE

## 2024-09-11 PROCEDURE — 120N000001 HC R&B MED SURG/OB

## 2024-09-11 PROCEDURE — 250N000013 HC RX MED GY IP 250 OP 250 PS 637: Performed by: INTERNAL MEDICINE

## 2024-09-11 PROCEDURE — 250N000013 HC RX MED GY IP 250 OP 250 PS 637: Performed by: HOSPITALIST

## 2024-09-11 PROCEDURE — 97535 SELF CARE MNGMENT TRAINING: CPT | Mod: GO

## 2024-09-11 PROCEDURE — 250N000013 HC RX MED GY IP 250 OP 250 PS 637: Performed by: PSYCHIATRY & NEUROLOGY

## 2024-09-11 PROCEDURE — 250N000011 HC RX IP 250 OP 636: Performed by: INTERNAL MEDICINE

## 2024-09-11 PROCEDURE — 99232 SBSQ HOSP IP/OBS MODERATE 35: CPT | Performed by: INTERNAL MEDICINE

## 2024-09-11 RX ORDER — QUETIAPINE FUMARATE 25 MG/1
25 TABLET, FILM COATED ORAL EVERY MORNING
Status: DISCONTINUED | OUTPATIENT
Start: 2024-09-12 | End: 2024-09-13

## 2024-09-11 RX ORDER — QUETIAPINE FUMARATE 50 MG/1
50 TABLET, FILM COATED ORAL AT BEDTIME
Status: DISCONTINUED | OUTPATIENT
Start: 2024-09-11 | End: 2024-09-13

## 2024-09-11 RX ORDER — GUAIFENESIN/DEXTROMETHORPHAN 100-10MG/5
10 SYRUP ORAL EVERY 4 HOURS PRN
Status: DISCONTINUED | OUTPATIENT
Start: 2024-09-11 | End: 2024-09-18 | Stop reason: HOSPADM

## 2024-09-11 RX ADMIN — ACETAMINOPHEN 650 MG: 325 TABLET ORAL at 03:51

## 2024-09-11 RX ADMIN — THIAMINE HYDROCHLORIDE 250 MG: 100 INJECTION, SOLUTION INTRAMUSCULAR; INTRAVENOUS at 09:23

## 2024-09-11 RX ADMIN — QUETIAPINE FUMARATE 50 MG: 50 TABLET ORAL at 21:12

## 2024-09-11 RX ADMIN — LEVETIRACETAM 1000 MG: 500 TABLET, FILM COATED ORAL at 09:23

## 2024-09-11 RX ADMIN — LIDOCAINE 2 PATCH: 560 PATCH PERCUTANEOUS; TOPICAL; TRANSDERMAL at 09:43

## 2024-09-11 RX ADMIN — ATORVASTATIN CALCIUM 80 MG: 80 TABLET, FILM COATED ORAL at 09:23

## 2024-09-11 RX ADMIN — LEVETIRACETAM 1000 MG: 500 TABLET, FILM COATED ORAL at 21:12

## 2024-09-11 RX ADMIN — FOLIC ACID 1 MG: 1 TABLET ORAL at 09:23

## 2024-09-11 RX ADMIN — Medication 1 TABLET: at 09:23

## 2024-09-11 RX ADMIN — QUETIAPINE 12.5 MG: 25 TABLET, FILM COATED ORAL at 04:47

## 2024-09-11 RX ADMIN — ENOXAPARIN SODIUM 40 MG: 40 INJECTION SUBCUTANEOUS at 10:56

## 2024-09-11 RX ADMIN — GUAIFENESIN AND DEXTROMETHORPHAN 10 ML: 100; 10 SYRUP ORAL at 13:35

## 2024-09-11 RX ADMIN — QUETIAPINE 12.5 MG: 25 TABLET, FILM COATED ORAL at 10:56

## 2024-09-11 ASSESSMENT — ACTIVITIES OF DAILY LIVING (ADL)
ADLS_ACUITY_SCORE: 26
ADLS_ACUITY_SCORE: 30
ADLS_ACUITY_SCORE: 26
ADLS_ACUITY_SCORE: 30
ADLS_ACUITY_SCORE: 26
ADLS_ACUITY_SCORE: 30
ADLS_ACUITY_SCORE: 26
ADLS_ACUITY_SCORE: 30
ADLS_ACUITY_SCORE: 26
ADLS_ACUITY_SCORE: 26
ADLS_ACUITY_SCORE: 30
ADLS_ACUITY_SCORE: 30
ADLS_ACUITY_SCORE: 26

## 2024-09-11 NOTE — PLAN OF CARE
"Goal Outcome Evaluation:       Reason for Admission: Seziure     Cognitive/Mentation: A/Ox 1  Neuros/CMS: Difficult to assess as patient has a hard time following directions.  VS: BP (!) 148/75 (BP Location: Left arm)   Pulse 93   Temp 98.6  F (37  C) (Oral)   Resp 16   Ht 1.727 m (5' 8\")   Wt 72.6 kg (160 lb)   SpO2 93%   BMI 24.33 kg/m  .   Tele: Normal Sinus.  /GI: Continent. Last BM unkown.   Pulmonary: LS Clear .  Pain: NURIS.     Drains/Lines: R PIV  Skin: Intact  Activity: Assist x 1-2 with walker, gait belt.  Diet: Regular with thin liquids. Takes pills whole.     Therapies recs: PT/OT    Aggression Stoplight Tool: Green    End of shift summary: Patient was restless for the majority of the shift, requiring PRN Seroquel 12.5mg. Neuro exams were difficult to assess as patient was restless, unable to focus and appearing confused. Patient ate meals well with his wife, Alaina, present. With assist 1-2 and walker, patient is able to ambulate to the bathroom. Covid precautions are still in place. Patient is to have lumbar puncture done tomorrow.                   "

## 2024-09-11 NOTE — PLAN OF CARE
Goal Outcome Evaluation:      Plan of Care Reviewed With: patient    Overall Patient Progress: no changeOverall Patient Progress: no change    Neuros ex. Intermittent confusion, disoriented to situation, frequent periods of restlessness, pt rambling illogically at times, preoccupied with items in the room and briefly redirectable. Sitter at bedside. PRN Seroquel given x2 allowing periods of rest. VSS. CMS intact. Tylenol given x2 for back pain, warm pack. Ambulates A1 gb. IV SL. Tolerating diet, takes pills one at a time, check for swallow. Mepilex to sacrum for blanchable redness, when pt allows it to stay on. Plan for LP today, likely with sedation. Discharge pending.

## 2024-09-11 NOTE — PROGRESS NOTES
Updated Dr. Russo that no available physician for sedation with LP until tomorrow. Ok with plan. Hold lovenox in AM 9/12 for LP.

## 2024-09-11 NOTE — PROGRESS NOTES
[facility]    Medicine Progress Note - Hospitalist Service    Date of Admission:  9/4/2024    Assessment & Plan   Ivan Webb is a markedly pleasant 69 year old gentleman with past medical history that is most notable for prior seizure, who presents with acute confusion and convulsive syncope. and is found to have suspected acute seizure and acute metabolic encephalopathy.     Suspected epilepsy  Acute metabolic encephalopathy  Mr. Webb was travelling back to his home in [place] on the day of admission. He recently quit smoking several weeks ago. It seems that he was hospitalized there in 2020 for acute confusion and bizarre behavior. It was documented that he was drinking 4-6 alcoholic beverages per day at that time. While hospitalized he had a seizure. Alcohol withdrawal and/or wernicke korsakoff syndrome were suspected. No further seizures have been known to have occurred before or since.  Now, he presented for evaluation of acute confusion, culminating in a [place] the airport. In the ED, he was afebrile, without hypotension, tachycardia, or hypoxia. WBC was normal, as was hepatic panel. He had mild acute metabolic acidosis and thrombocytopenia as discussed below. UA as well as UDS were negative and Ethyl alcohol level was undetectable. CT and CTA of head and neck showed no acute processes.   Overall, his symptoms seemed consistent with a seizure. He could have alcohol withdrawal or a post-viral process.  Registered to observation initially, then admitted to inpatient on 9/6/24.  Neurology consulted, appreciate their assistance.  EEG on 9/5/24 showed PLEDS, resolved around 7:00 pm on 9/5/24.  MRI brain on 9/5/24 showed no acute abnormality.  Loaded with keppra on 9/5/24, then started on keppra 750 mg PO BID.  Seizure precautions.  TSH and vitamin B12 within normal limits.  Ammonia level low at 13.  Multivitamin, folic acid, and  "thiamine.  Monitor for signs of withdrawal, although suspect this is unlikely as patient's alcohol use has diminished significantly since 2020, now has one drink per day.  Discontinued Wellbutrin.  Discussed with neurology 09/08.  Continues with mild encephalopathy on EEG.  Continue with Keppra.    9/9: More confused.  Appreciate neurology evaluation.  Continue to treat toxic metabolic causes.  Started trial of IV thiamine: Required few doses of p.o. Seroquel 9/10.  LP could not be completed due to need for sedation.  Plan for 9/12 9/11: Will give trial of scheduled Seroquel 25 mg in the morning and 50 mg at bedtime to help with sleep and delirium  Also explained in detail to the wife, lumbar puncture is diagnostic and encephalitis panel may take days to come back. To me it is low probability that it will be positive.  Will discuss with neurology ?  Empiric steroids after LP if high suspicion for autoimmune encephalitis  -Patient had similar episode 4 years ago under stress and excessive alcohol use.  Will also request psychiatry for review for possible psychosis (not an organic cause)    LBP: Age-indeterminate fractures at T7, T8, T9, and T12   -MRI from 09/09 reviewed.  Appreciate neurosurgery review \"Thoracic fractures likely chronic in nature clinically. Recommend patient follow up with PCP in [place] if ongoing or worsening symptoms.    No bracing necessary. \"     Recent COVID-19 infection  Patient developed a cough, fever, and sneezes about 1 week prior to admission.  Wife subsequently developed the same symptoms.  COVID-19 PCR was positive on 9/5/2024.   not hypoxic.    Acute metabolic acidosis  Suspect due to hypovolemia: Given IV fluid in the ED.  Resolved after IV fluids.     Thrombocytopenia, acute- resolved   Mild, platelets 137 initially. Had been normal on 6/17/24.  Could be due to acute illness.  Platelets down to 118 on 9/5     Hyperlipidemia  Continue PTA atorvastatin.     Tobacco use  Has " been on Wellbutrin for a while (patient and wife not sure exactly how long) for tobacco cessation.  They state he stopped smoking about 2 weeks ago.  stopped Wellbutrin as it can lower the seizure threshold.     History of prostate cancer  Status post prostactectomy.   Noted.        Diet: Regular Diet Adult    DVT Prophylaxis: Pneumatic Compression Devices  Kennedy Catheter: Not present  Lines: None     Cardiac Monitoring: None  Code Status: Full Code      Clinically Significant Risk Factors                                # Financial/Environmental Concerns: none             Disposition Plan     Medically Ready for Discharge: Anticipated Tomorrow         Estiven Randall MD  Hospitalist Service  [facility]  Securely message with [employer] (more info)  Text page via [employer] Paging/Directory   The above note was dictated using voice recognition software. Although reviewed after completion, some word and grammatical error may remain . Please contact the author for any clarifications.  ______________________________________________________________________    Interval History   More confused today.  Random laughing    Physical Exam   Vital Signs: Temp: 98.6  F (37  C) Temp src: Oral BP: (!) 173/94 Pulse: 85   Resp: 18 SpO2: 93 % O2 Device: None (Room air)    Weight: 160 lbs 0 oz    Gen- pleasant   HEENT- NAD, AIDA  Neck- supple  CVS- I+II+ no m/r/g  RS- no tachypnea  Abdo- soft, no tenderness .   Ext- no edema   CNS-awake .  Moves all 4 limbs.  Does not give any proper responses today  Medical Decision Making       51 MINUTES SPENT BY ME on the date of service doing chart review, history, exam, documentation & further activities per the note.  Discussion with patient, patient's wife, , OT, review of neurology note    Data   ------------------------- PAST 24 HR DATA REVIEWED -----------------------------------------------        Imaging results reviewed over the past 24 hrs:   No results  found for this or any previous visit (from the past 24 hour(s)).

## 2024-09-12 ENCOUNTER — APPOINTMENT (OUTPATIENT)
Dept: GENERAL RADIOLOGY | Facility: CLINIC | Age: 69
DRG: 100 | End: 2024-09-12
Attending: PSYCHIATRY & NEUROLOGY
Payer: MEDICARE

## 2024-09-12 LAB
ALBUMIN SERPL BCG-MCNC: 4.4 G/DL (ref 3.5–5.2)
ALP SERPL-CCNC: 86 U/L (ref 40–150)
ALT SERPL W P-5'-P-CCNC: 38 U/L (ref 0–70)
ANION GAP SERPL CALCULATED.3IONS-SCNC: 15 MMOL/L (ref 7–15)
APPEARANCE CSF: CLEAR
AST SERPL W P-5'-P-CCNC: 44 U/L (ref 0–45)
BILIRUB SERPL-MCNC: 1 MG/DL
BUN SERPL-MCNC: 15 MG/DL (ref 8–23)
C GATTII+NEOFOR DNA CSF QL NAA+NON-PROBE: NEGATIVE
CALCIUM SERPL-MCNC: 9.8 MG/DL (ref 8.8–10.4)
CHLORIDE SERPL-SCNC: 103 MMOL/L (ref 98–107)
CMV DNA CSF QL NAA+NON-PROBE: NEGATIVE
COLOR CSF: COLORLESS
CREAT SERPL-MCNC: 1.01 MG/DL (ref 0.67–1.17)
E COLI K1 AG CSF QL: NEGATIVE
EGFRCR SERPLBLD CKD-EPI 2021: 80 ML/MIN/1.73M2
ERYTHROCYTE [DISTWIDTH] IN BLOOD BY AUTOMATED COUNT: 12.3 % (ref 10–15)
EV RNA SPEC QL NAA+PROBE: NEGATIVE
GLUCOSE BLDC GLUCOMTR-MCNC: 98 MG/DL (ref 70–99)
GLUCOSE CSF-MCNC: 68 MG/DL (ref 40–70)
GLUCOSE SERPL-MCNC: 129 MG/DL (ref 70–99)
GP B STREP DNA CSF QL NAA+NON-PROBE: NEGATIVE
HAEM INFLU DNA CSF QL NAA+NON-PROBE: NEGATIVE
HCO3 SERPL-SCNC: 23 MMOL/L (ref 22–29)
HCT VFR BLD AUTO: 42.7 % (ref 40–53)
HGB BLD-MCNC: 15.1 G/DL (ref 13.3–17.7)
HHV6 DNA CSF QL NAA+NON-PROBE: NEGATIVE
HSV1 DNA CSF QL NAA+NON-PROBE: NEGATIVE
HSV2 DNA CSF QL NAA+NON-PROBE: NEGATIVE
L MONOCYTOG DNA CSF QL NAA+NON-PROBE: NEGATIVE
Lab: NORMAL
MCH RBC QN AUTO: 32.8 PG (ref 26.5–33)
MCHC RBC AUTO-ENTMCNC: 35.4 G/DL (ref 31.5–36.5)
MCV RBC AUTO: 93 FL (ref 78–100)
N MEN DNA CSF QL NAA+NON-PROBE: NEGATIVE
PARECHOVIRUS A RNA CSF QL NAA+NON-PROBE: NEGATIVE
PERFORMING LABORATORY: NORMAL
PLATELET # BLD AUTO: 257 10E3/UL (ref 150–450)
POTASSIUM SERPL-SCNC: 3.7 MMOL/L (ref 3.4–5.3)
PROT CSF-MCNC: 41 MG/DL (ref 15–45)
PROT SERPL-MCNC: 7.8 G/DL (ref 6.4–8.3)
RBC # BLD AUTO: 4.61 10E6/UL (ref 4.4–5.9)
RBC # CSF MANUAL: 0 /UL (ref 0–2)
S PNEUM DNA CSF QL NAA+NON-PROBE: NEGATIVE
SODIUM SERPL-SCNC: 141 MMOL/L (ref 135–145)
SPECIMEN STATUS: NORMAL
TEST NAME: NORMAL
TUBE # CSF: 4
VZV DNA CSF QL NAA+NON-PROBE: NEGATIVE
WBC # BLD AUTO: 11.4 10E3/UL (ref 4–11)
WBC # CSF MANUAL: 2 /UL (ref 0–5)

## 2024-09-12 PROCEDURE — 250N000013 HC RX MED GY IP 250 OP 250 PS 637: Performed by: INTERNAL MEDICINE

## 2024-09-12 PROCEDURE — 82945 GLUCOSE OTHER FLUID: CPT | Performed by: PSYCHIATRY & NEUROLOGY

## 2024-09-12 PROCEDURE — 250N000009 HC RX 250: Performed by: PSYCHIATRY & NEUROLOGY

## 2024-09-12 PROCEDURE — 62328 DX LMBR SPI PNXR W/FLUOR/CT: CPT

## 2024-09-12 PROCEDURE — 82784 ASSAY IGA/IGD/IGG/IGM EACH: CPT | Performed by: PSYCHIATRY & NEUROLOGY

## 2024-09-12 PROCEDURE — 250N000013 HC RX MED GY IP 250 OP 250 PS 637: Performed by: PSYCHIATRY & NEUROLOGY

## 2024-09-12 PROCEDURE — 84155 ASSAY OF PROTEIN SERUM: CPT | Performed by: INTERNAL MEDICINE

## 2024-09-12 PROCEDURE — 86255 FLUORESCENT ANTIBODY SCREEN: CPT | Performed by: PSYCHIATRY & NEUROLOGY

## 2024-09-12 PROCEDURE — 87483 CNS DNA AMP PROBE TYPE 12-25: CPT | Performed by: PSYCHIATRY & NEUROLOGY

## 2024-09-12 PROCEDURE — 999N000154 HC STATISTIC RADIOLOGY XRAY, US, CT, MAR, NM

## 2024-09-12 PROCEDURE — 009U3ZX DRAINAGE OF SPINAL CANAL, PERCUTANEOUS APPROACH, DIAGNOSTIC: ICD-10-PCS | Performed by: PHYSICIAN ASSISTANT

## 2024-09-12 PROCEDURE — 85027 COMPLETE CBC AUTOMATED: CPT | Performed by: INTERNAL MEDICINE

## 2024-09-12 PROCEDURE — 84157 ASSAY OF PROTEIN OTHER: CPT | Performed by: PSYCHIATRY & NEUROLOGY

## 2024-09-12 PROCEDURE — 89050 BODY FLUID CELL COUNT: CPT | Performed by: PSYCHIATRY & NEUROLOGY

## 2024-09-12 PROCEDURE — 250N000011 HC RX IP 250 OP 636: Performed by: INTERNAL MEDICINE

## 2024-09-12 PROCEDURE — 250N000011 HC RX IP 250 OP 636: Performed by: HOSPITALIST

## 2024-09-12 PROCEDURE — 87205 SMEAR GRAM STAIN: CPT | Performed by: PSYCHIATRY & NEUROLOGY

## 2024-09-12 PROCEDURE — 99232 SBSQ HOSP IP/OBS MODERATE 35: CPT | Performed by: INTERNAL MEDICINE

## 2024-09-12 PROCEDURE — 36415 COLL VENOUS BLD VENIPUNCTURE: CPT | Performed by: INTERNAL MEDICINE

## 2024-09-12 PROCEDURE — 120N000001 HC R&B MED SURG/OB

## 2024-09-12 PROCEDURE — 82040 ASSAY OF SERUM ALBUMIN: CPT | Performed by: INTERNAL MEDICINE

## 2024-09-12 RX ORDER — NALOXONE HYDROCHLORIDE 0.4 MG/ML
0.2 INJECTION, SOLUTION INTRAMUSCULAR; INTRAVENOUS; SUBCUTANEOUS
Status: DISCONTINUED | OUTPATIENT
Start: 2024-09-12 | End: 2024-09-12

## 2024-09-12 RX ORDER — NALOXONE HYDROCHLORIDE 0.4 MG/ML
0.4 INJECTION, SOLUTION INTRAMUSCULAR; INTRAVENOUS; SUBCUTANEOUS
Status: DISCONTINUED | OUTPATIENT
Start: 2024-09-12 | End: 2024-09-12

## 2024-09-12 RX ORDER — LEVETIRACETAM 10 MG/ML
1000 INJECTION INTRAVASCULAR ONCE
Status: COMPLETED | OUTPATIENT
Start: 2024-09-12 | End: 2024-09-12

## 2024-09-12 RX ORDER — QUETIAPINE FUMARATE 25 MG/1
25 TABLET, FILM COATED ORAL
Status: DISCONTINUED | OUTPATIENT
Start: 2024-09-12 | End: 2024-09-13

## 2024-09-12 RX ORDER — LIDOCAINE HYDROCHLORIDE 10 MG/ML
30 INJECTION, SOLUTION EPIDURAL; INFILTRATION; INTRACAUDAL; PERINEURAL ONCE
Status: COMPLETED | OUTPATIENT
Start: 2024-09-12 | End: 2024-09-12

## 2024-09-12 RX ORDER — OLANZAPINE 10 MG/2ML
2.5 INJECTION, POWDER, FOR SOLUTION INTRAMUSCULAR 2 TIMES DAILY PRN
Status: DISCONTINUED | OUTPATIENT
Start: 2024-09-12 | End: 2024-09-13

## 2024-09-12 RX ORDER — FLUMAZENIL 0.1 MG/ML
0.2 INJECTION, SOLUTION INTRAVENOUS
Status: DISCONTINUED | OUTPATIENT
Start: 2024-09-12 | End: 2024-09-12

## 2024-09-12 RX ORDER — FENTANYL CITRATE 50 UG/ML
25-50 INJECTION, SOLUTION INTRAMUSCULAR; INTRAVENOUS EVERY 5 MIN PRN
Status: DISCONTINUED | OUTPATIENT
Start: 2024-09-12 | End: 2024-09-12

## 2024-09-12 RX ADMIN — LIDOCAINE HYDROCHLORIDE 3 ML: 10 INJECTION, SOLUTION EPIDURAL; INFILTRATION; INTRACAUDAL; PERINEURAL at 10:53

## 2024-09-12 RX ADMIN — QUETIAPINE 12.5 MG: 25 TABLET, FILM COATED ORAL at 14:25

## 2024-09-12 RX ADMIN — LIDOCAINE 2 PATCH: 560 PATCH PERCUTANEOUS; TOPICAL; TRANSDERMAL at 08:50

## 2024-09-12 RX ADMIN — THIAMINE HYDROCHLORIDE 250 MG: 100 INJECTION, SOLUTION INTRAMUSCULAR; INTRAVENOUS at 08:50

## 2024-09-12 RX ADMIN — OLANZAPINE 2.5 MG: 10 INJECTION, POWDER, FOR SOLUTION INTRAMUSCULAR at 14:51

## 2024-09-12 RX ADMIN — Medication 1 TABLET: at 08:50

## 2024-09-12 RX ADMIN — LEVETIRACETAM 1000 MG: 10 INJECTION INTRAVENOUS at 22:19

## 2024-09-12 ASSESSMENT — ACTIVITIES OF DAILY LIVING (ADL)
ADLS_ACUITY_SCORE: 30
ADLS_ACUITY_SCORE: 34
ADLS_ACUITY_SCORE: 30
ADLS_ACUITY_SCORE: 34
ADLS_ACUITY_SCORE: 30

## 2024-09-12 NOTE — CONSULTS
"BRIEF IP PSYCHIATRY NOTE  09/12/24  Consult received re: psychosis, encephalopathy, alcohol use.  Chart reviewed in detail and discussed with Dr. Randall.    In brief, this is a 69-year-old male with history of major depressive disorder, recurrent, severe with psychosis, brief psychotic episode, and documented nonepileptiform seizure in 2020.  Electronic chart review performed.  In  2020, hospitalized for increased anxiety due to recent move.  Associated with seizure episode with EEG normal.  Previous psychiatric medication management included Klonopin and Zoloft.  Patient discharged on trazodone 50 mg p.o. nightly.    Now, presenting to this facility's ED on 9/4.  Patient connecting at [facility] to [place], [place] after a tour of [employer].  Reportedly, description of seizure-like episode while in airport line, then suddenly fell backwards onto backpack witnessed by wife with a tonic/clonic seizure.  No closed head injury reported.  On ED evaluation,  has no recollection of the episode.  Noted to have O2 sats and 80s.  Found to be COVID-positive.  Prior to episode, wife describes behaviors that were out of baseline throughout the morning.  Further description of alcohol use, felt to be vague in amount initially.  Later, described as, \"a lot.\"    Recommendation for admission for alcohol withdrawal and monitoring of bizarre behaviors.  Loaded on Keppra 9/6.  Psychiatric PTA medications included Wellbutrin XL, which was discontinued.  Indication was for smoking cessation, with last use 2 weeks ago.  Initially, patient improved and worsening noted.  Neurology involved throughout to include continuous EEG monitoring without clear epileptiform activity.  Consult placed to psychiatry regarding psychosis, encephalopathy, alcohol use.    Attempted to evaluate patient today.  Patient LP procedure.  Discussed with charge RN and attending regarding plans to re-evaluate tomorrow.    Spoke with attending regarding " medication management and meantime.  In cases like this, support Seroquel medication management.  Patient prescribed Seroquel 25 mg in the morning and 50 mg at bedtime.  Unclear benefit from initiated on 9/11.    Consult closed psychiatry will continue to follow.    Non billable visit  Freddy Kong MD

## 2024-09-12 NOTE — PLAN OF CARE
Pt here with seizures, acute metabolic encephalopathy, and COVID+. NURIS orientation. Neuros confusion, illogical speech, and generalized weakness. Pt inconsistent command following.  VSS, SBP<180. Regular diet, thin liquids. Takes pills whole. PRN Seroquel and Zyprexa given for agitation. Up with Ax1 GBW, pt refusing GBW at times. No nonverbal signs of pain indicated. Sitter at bedside. Pt scoring yellow on the Aggression Stop Light Tool for restlessness/agitation. LP completed this shift, pt on bedrest for rest of day. Plan psych consult. Discharge pending.

## 2024-09-12 NOTE — PLAN OF CARE
Goal Outcome Evaluation:      Plan of Care Reviewed With: patient    Overall Patient Progress: no changeOverall Patient Progress: no change    Outcome Evaluation: VSS, pending LP today      Diagnosis/Reason for admission:  seizure, confusion/restless  Orientation/Cognitive: A&O to self only, confused  Neuros: NURIS, not following command  Aggression Stop Light Tool Color: Green  Mobility Level: Ax1 GB/W  Pain Management: denies  Tele/VS/O2: VSS@RA  Diet: Regular, thin liquids  Bowel/Bladder: voided  ISO TYPE: COVID-19, special precaution  Drains/Devices: PIV SL, R  Other info: plan for LP today@ 10am-11am, hold Lovenox . Sitter at bedside  D/ C date: TBD

## 2024-09-12 NOTE — PRE-PROCEDURE
GENERAL PRE-PROCEDURE:   Procedure:  LP  Date/Time:  9/12/2024 10:36 AM    Written consent obtained?: Yes    Risks and benefits: Risks, benefits and alternatives were discussed    Consent given by:  Spouse  Patient states understanding of procedure being performed: No    Patient's understanding of procedure matches consent: Yes    Procedure consent matches procedure scheduled: Yes    Expected level of sedation:  Minimal  Appropriately NPO:  Yes  Mallampati  :  Grade 4- soft palate obscured by base of tongue  Lungs:  Lungs clear with good breath sounds bilaterally  Heart:  Normal heart sounds and rate  History & Physical reviewed:  History and physical reviewed and no updates needed  Statement of review:  I have reviewed the lab findings, diagnostic data, medications, and the plan for sedation

## 2024-09-12 NOTE — PROGRESS NOTES
[facility]    Medicine Progress Note - Hospitalist Service    Date of Admission:  9/4/2024    Assessment & Plan   Ivan Webb is a markedly pleasant 69 year old gentleman with past medical history that is most notable for prior seizure, who presents with acute confusion and convulsive syncope. and is found to have suspected acute seizure and acute metabolic encephalopathy.     Suspected epilepsy  Acute metabolic encephalopathy  Mr. Webb was travelling back to his home in [place] on the day of admission. He recently quit smoking several weeks ago. It seems that he was hospitalized there in 2020 for acute confusion and bizarre behavior. It was documented that he was drinking 4-6 alcoholic beverages per day at that time. While hospitalized he had a seizure. Alcohol withdrawal and/or wernicke korsakoff syndrome were suspected. No further seizures have been known to have occurred before or since.  Now, he presented for evaluation of acute confusion, culminating [place]. In the ED, he was afebrile, without hypotension, tachycardia, or hypoxia. WBC was normal, as was hepatic panel. He had mild acute metabolic acidosis and thrombocytopenia as discussed below. UA as well as UDS were negative and Ethyl alcohol level was undetectable. CT and CTA of head and neck showed no acute processes.   Overall, his symptoms seemed consistent with a seizure. He could have alcohol withdrawal or a post-viral process.  Registered to observation initially, then admitted to inpatient on 9/6/24.  Neurology consulted, appreciate their assistance.  EEG on 9/5/24 showed PLEDS, resolved around 7:00 pm on 9/5/24.  MRI brain on 9/5/24 showed no acute abnormality.  Loaded with keppra on 9/5/24, then started on keppra 750 mg PO BID.  Seizure precautions.  TSH and vitamin B12 within normal limits.  Ammonia level low at 13.  Multivitamin, folic acid, and  "thiamine.  Monitor for signs of withdrawal, although suspect this is unlikely as patient's alcohol use has diminished significantly since 2020, now has one drink per day.  Discontinued Wellbutrin.  Discussed with neurology 09/08.  Continues with mild encephalopathy on EEG.  Continue with Keppra.    9/9: More confused.  Appreciate neurology evaluation.  Continue to treat toxic metabolic causes.  Started trial of IV thiamine: Required few doses of p.o. Seroquel 9/10.  LP could not be completed due to need for sedation.  Plan for 9/12 9/11: Started trial of scheduled Seroquel 25 mg in the morning and 50 mg at bedtime to help with sleep and delirium  9/12: Follow up LP results.  Discussed in detail with neurology and psychiatry.  Will consider repeating EEG.  Continue current Seroquel pending further review by psychiatry      LBP: Age-indeterminate fractures at T7, T8, T9, and T12   -MRI from 09/09 reviewed.  Appreciate neurosurgery review \"Thoracic fractures likely chronic in nature clinically. Recommend patient follow up with PCP in [place] if ongoing or worsening symptoms.    No bracing necessary. \"     Recent COVID-19 infection  Patient developed a cough, fever, and sneezes about 1 week prior to admission.  Wife subsequently developed the same symptoms.  COVID-19 PCR was positive on 9/5/2024.   not hypoxic.    Acute metabolic acidosis  Suspect due to hypovolemia: Given IV fluid in the ED.  Resolved after IV fluids.     Thrombocytopenia, acute- resolved   Mild, platelets 137 initially. Had been normal on 6/17/24.  Could be due to acute illness.  Platelets down to 118 on 9/5     Hyperlipidemia  Continue PTA atorvastatin.     Tobacco use  Has been on Wellbutrin for a while (patient and wife not sure exactly how long) for tobacco cessation.  They state he stopped smoking about 2 weeks ago.  stopped Wellbutrin as it can lower the seizure threshold.     History of prostate cancer  Status post prostactectomy. "   Noted.        Diet: Regular Diet Adult    DVT Prophylaxis: Pneumatic Compression Devices  Kennedy Catheter: Not present  Lines: None     Cardiac Monitoring: None  Code Status: Full Code      Clinically Significant Risk Factors                                # Financial/Environmental Concerns: none             Disposition Plan     Medically Ready for Discharge: Anticipated Tomorrow         Estiven Randall MD  Hospitalist Service  [facility]  Securely message with [employer] (more info)  Text page via [employer] Paging/Directory   The above note was dictated using voice recognition software. Although reviewed after completion, some word and grammatical error may remain . Please contact the author for any clarifications.  ______________________________________________________________________    Interval History   This a.m. events noted.  Tolerated LP and now sleeping  Physical Exam   Vital Signs: Temp: 97.3  F (36.3  C) Temp src: Axillary BP: (!) 152/97 Pulse: 102   Resp: 16 SpO2: 95 % O2 Device: None (Room air)    Weight: 160 lbs 0 oz    CVS- I+II+ no m/r/g  RS- no tachypnea  Abdo- soft, no tenderness .   Ext- no edema   CNS-sleeping  Medical Decision Making       51 MINUTES SPENT BY ME on the date of service doing chart review, history, exam, documentation & further activities per the note.  Discussion with patient, neurology, psychiatry    Data   ------------------------- PAST 24 HR DATA REVIEWED -----------------------------------------------    I have personally reviewed the following data over the past 24 hrs:    11.4 (H)  \   15.1   / 257     141 103 15.0 /  129 (H)   3.7 23 1.01 \     ALT: 38 AST: 44 AP: 86 TBILI: 1.0   ALB: 4.4 TOT PROTEIN: 7.8 LIPASE: N/A       Imaging results reviewed over the past 24 hrs:   Recent Results (from the past 24 hour(s))   XR Lumbar Puncture Spinal Tap Diag    Narrative    EXAM: XR LUMBAR PUNCTURE SPINAL TAP DIAGNOSTIC  9/12/2024 11:15 AM       History:   Confusion, Abnormal behavior.     PROCEDURE: The patient consented for a lumbar puncture. The benefits  and risks of the procedure were explained to the patient, including  but not limited to worsening headache, hemorrhage, infection, lower  extremity pain, or nerve root injury. The patient was sterilely  prepped and draped with the patient in the supine position, over the  lower back. Under fluoroscopic guidance, the interlaminar spaces were  noted. 1% lidocaine was administered for local anesthetic over the  L4-5 interlaminar space, and a 20 gauge needle was advanced into the  thecal sac under fluoroscopic guidance.  There was initial aspiration  of clear CSF. About 10 cc's total were aspirated.  The needle was  removed. There were no immediate complications associated with the  procedure.   Estimated blood loss during the procedure was less than 5  mL.    Opening Pressure: ~27 cm of water  Fluoro time: 0.2 minutes  Images Obtained: 1  Medications used: 3 mL of 1% lidocaine.      Impression    IMPRESSION: Successful lumbar puncture. No sedation needed.    SUPA FRANKLIN PA-C         SYSTEM ID:  [id number]

## 2024-09-12 NOTE — PROGRESS NOTES
1030: Arrived to Xray to assist with sedation for a lumbar puncture.  1035: VSS. HARIS Mccollum, at bedside at this time and assessed pt. Pt unable to follow basic commands, in and out of bed, pacing the room. For this reason, Khari decided proceeding with the LP was best without sedation. I remained in procedure room, assisting with pt positioning and comfort. Pt tolerated well.

## 2024-09-12 NOTE — PROVIDER NOTIFICATION
MD Notification    Notified Person: MD    Notified Person Name: Estiven Randall via [employer] and Dr. Russo via telephone    Notification Date/Time:  9/12/24 @ 0900    Purpose of Notification: FYI pt seems more confused today. Pt will not follow any commands including swallowing his morning medications.     Comments: Move forward with LP this morning and will obtain repeat EEG if needed.

## 2024-09-12 NOTE — PROGRESS NOTES
"  Neurology Progress Note               Interval History:     Patient is more confused and not following commands since this morning.  The nursing staff noted that he is not answering questions and sitting in a corner and not following commands.  Patient had bouts of crying this morning.  He starts crying when I was examining him.  He did not bring out any words during the exam.              Medications:   I have reviewed this patient's current medications               Physical Exam:   Blood pressure (!) 141/87, pulse 105, temperature 98.7  F (37.1  C), temperature source Axillary, resp. rate 18, height 1.727 m (5' 8\"), weight 72.6 kg (160 lb), SpO2 97%.  GENERAL EXAMINATION:  HEENT: PERRLA, EOMI.    NEUROLOGICAL EXAMINATION  Mental Status:  Patient is awake, alert, but disoriented.  He did not answer any questions.  He touched his toes when I asked him to touch his nose.  Patient starts crying when I asked him about any stressors.    Cranial Nerves: Pupils are equal and reactive to light. Extraocular movements are intact. There is no nystagmus in the horizontal or vertical planes. No facial weakness. The tongue is midline.     Motor: Muscle tone is normal. There is no pronator drift. There is no muscle atrophy.  He moves all 4 extremities..     Coordination: .Finger to nose - normal.         Data:     Review of Diagnostics:     I personally reviewed and interpreted the following:    MR Thoracic Spine w/o Contrast    Result Date: 9/9/2024  EXAM: MR THORACIC SPINE W/O CONTRAST LOCATION: [facility] DATE: 9/9/2024 INDICATION: T7  T12 fractures COMPARISON: Thoracic spine radiographs: 9/5/2024. Head/neck CTA: 9/4/2024. TECHNIQUE: Routine Thoracic Spine MRI without IV contrast. FINDINGS: Incomplete examination, terminated early due to excessive patient motion. Sagittal STIR sequence was not obtained. Superior endplate fractures at T3, T4, T5, T6, T7, T8, T9, and T12. Height loss is greatest and " approximately 50% at T4. No definite marrow edema, though evaluation is limited in the absence of sagittal STIR sequence. Mild multilevel degenerative disc disease without findings to suggest high-grade spinal canal or neural foraminal stenosis within constraints of motion degradation. No definite cord signal abnormality within constraints of motion. No definite extraspinal abnormality.     IMPRESSION: 1.  Incomplete examination, terminated early due to excessive patient motion. Sagittal STIR sequence was not obtained. 2.  Superior endplate fractures at T3, T4, T5, T6, T7, T8, T9 and T12. Height loss is greatest and approximately 50% at T4. No definite marrow edema at any levels, though evaluation is substantially limited in the absence of sagittal STIR sequence. If there is persistent clinical concern, suggest repeat study with formal sedation. 3.  No evidence for high-grade spinal canal or neural foraminal stenosis. 4.  No definite cord signal abnormality.    EEG Video 12-26 hr Unmonitored    Result Date: 9/8/2024  EEG Video 12-26 hr Unmonitored Result VIDEO EEG DATE: 9/7/24 VIDEO EEG LOG: [facility]  LTV VIDEO EEG DAY#: 2 VIDEO EEG SOURCE FILE DURATION: 19 HRS AND 10 MIN PATIENT INFORMATION: Ivan Webb is a 69 year old year old male who presents with episodic confusion. MEDICATIONS: Reviewed-See electronic health record for list of medications administered on the day of this recording.  TECHNICAL SUMMARY: EEG was recorded from 24 MRI COMP. WIRES scalp electrodes placed according to the 10-20 international system. Additional electrodes were used for referencing, EKG, and to record from other cerebral regions as appropriate. Video was continuously recorded and was reviewed for clinical correlation. Electrodes were attached and both video and EEG were monitored and annotated by qualified EEG technologists. Video-EEG was reviewed and report generated by qualified physician. BACKGROUND ACTIVITY:  During  wakefulness, the background activity consists of synchronous and symmetric, well-modulated, 10 Hz posterior dominant rhythm. The posterior dominant rhythm attenuated with eye opening. During drowsiness, the background activity waxed and waned and there were periods of slowing and attenuation of the posterior alpha rhythm. ACTIVATION PROCEDURE: None. IMPRESSION OF VIDEO EEG DAY # 2: This video electroencephalogram is abnormal due to the presence of generalized intermittent slowing. This is a mildly abnormal awake and sleep video electroencephalogram which shows generalized slowing intermittently, pointing towards a mild encephalopathic etiology. No electrographic seizures or epileptiform discharges were recorded. Clinical correlation is advised. Isauro Jaime MD [facility] Neurology Office phone [phone]    EEG Video 12-26 hr Unmonitored    Result Date: 9/8/2024  EEG Video 12-26 hr Unmonitored Result VIDEO EEG DATE: 9/8/24 VIDEO EEG LOG: [id number]  LTV VIDEO EEG DAY#: 3 VIDEO EEG SOURCE FILE DURATION: 22 HRS AND 29 MIN PATIENT INFORMATION: Ivan Webb is a 69 year old year old male who presents with confusional episodes. MEDICATIONS: Reviewed-See electronic health record for list of medications administered on the day of this recording.  TECHNICAL SUMMARY: EEG was recorded from 24 MRI COMP. WIRES scalp electrodes placed according to the 10-20 international system. Additional electrodes were used for referencing, EKG, and to record from other cerebral regions as appropriate. Video was continuously recorded and was reviewed for clinical correlation. Electrodes were attached and both video and EEG were monitored and annotated by qualified EEG technologists. Video-EEG was reviewed and report generated by qualified physician. BACKGROUND ACTIVITY:  During wakefulness, the background activity consists of synchronous and symmetric, well-modulated,  10-11 Hz posterior dominant rhythm. The posterior dominant rhythm  attenuated with eye opening. During drowsiness, the background activity waxed and waned and there were periods of slowing and attenuation of the posterior alpha rhythm.  Normal sleep patterns were recorded in which synchronous and symmetrical spindles, slow waves and K complexes were identified.  ACTIVATION PROCEDURE: None. IMPRESSION OF VIDEO EEG DAY # 3: This video electroencephalogram is abnormal due to the presence of generalized intermittent slowing.  However, no clear PLEDs, sharp and slow wave complexes, or other epileptiform patterns are identified. This is a mildly abnormal awake and sleep video electroencephalogram due to the presence of generalized encephalopathic patterns. No electrographic seizures or epileptiform discharges were recorded. Clinical correlation is advised. Isauro Jaime MD [facility] Office phone [phone]    EEG Video 12-26 hr Unmonitored    Result Date: 9/7/2024  EEG Video 12-26 hr Unmonitored Result VIDEO EEG DATE: 9/6/24 VIDEO EEG LOG: [id number]   LTV VIDEO EEG DAY#: 1 VIDEO EEG SOURCE FILE DURATION: 21 hour and 15 min PATIENT INFORMATION: Ivan Webb is a 69 year old year old male who presents with seizures. MEDICATIONS: Reviewed-See electronic health record for list of medications administered on the day of this recording.  TECHNICAL SUMMARY: EEG was recorded from 24MRI scalp electrodes placed according to the 10-20 international system. Additional electrodes were used for referencing, EKG, and to record from other cerebral regions as appropriate. Video was continuously recorded and was reviewed for clinical correlation. Electrodes were attached and both video and EEG were monitored and annotated by qualified EEG technologists. Video-EEG was reviewed and report generated by qualified physician. BACKGROUND ACTIVITY:  During wakefulness, the background activity consists of synchronous and symmetric, well-modulated, 10 Hz posterior dominant rhythm. The posterior  dominant rhythm attenuated with eye opening. During drowsiness, the background activity waxed and waned and there were periods of slowing and attenuation of the posterior alpha rhythm. Normal sleep structures were recorded during sleep. ACTIVATION PROCEDURE: Photic. IMPRESSION OF VIDEO EEG during different phases: DAY # 1: 9/5/2024: PLEDS noted in the right> left frontal leads, most prominently in the F4/C4 leads. Corresponding garbled speech and confusion noted. This pattern continues from beginning of recording until 630 pm on 9/5/2024. Day 1-2 (9/5-9/6): EEG normalizes with very rare right frontal sharp waves, but no rhythmic slowing or associated clinical abnormalities. This pattern continues until about 3 pm on day #2 (9/6/2024).  Day 2 (9/6/2024): 7pm-8pm: EEG restarted secondary to patient's clinical status changes. Record shows frequent PLEDS and spike-wave activity in the right > left frontal regions. Video correlation shows confusion. Day 2-3 8pm-11 am: EEG normalizes again. Patient conversational/ sleeping. Corresponding EEG changes noted. Day 3: 11AM- 4pm: EEG appears normal without PLEDS or spike wave discharges noted previously. Conclusion: This EEG shows PLEDS/ spine and wave activity with alternating long phases of  normal awake and sleep video electroencephalogram recordings. Clinical correlation is advised. Isauro Jaime MD Neurologist, [facility] September 7, 2024 11:02 AM      XR Thoracic Lumbar Standing 2 Views    Result Date: 9/5/2024  EXAM: XR THORACIC LUMBAR STANDING 2 VIEWS LOCATION: [facility] DATE: 9/5/2024 INDICATION: LBP. r o fracture COMPARISON: None.     IMPRESSION: Age-indeterminate fractures at T7, T8, T9, and T12. Normal alignment. Mild multilevel disc height loss and facet hypertrophy. Aortic atherosclerotic versus.     EEG Awake or Drowsy Routine    Result Date: 9/5/2024  Table formatting from the original result was not included.  Electroencephalogram report [facility]      [September 5, 2024] Technical details: Routine, surface electrode EEG, performed using 18 channels of digitally acquired EEG, with additional channels for EKG and eye leads. Standard international 10 - 20 system employed for electrode placement.  Video monitoring was not performed. Special technical modifications: None. Duration of study: 23 min. Patient arousal states studied: Awake, resting, drowsy, asleep. Activation procedures performed: Photic stimulation. Waveform description: Predominant background during the record reveals well formed, 8-9Hz alpha rhythm, noted symmetrically, and blocking well with eye opening.  Brief instances of bifrontal, sharply contoured semirhythmic polymorphic delta/theta discharges are noted that subside into a fairly normal appearing EEG about 3 minutes into the record.  These slow waves are not noted again. Otherwise, we do not notice any unusual laterality, spike or sharp waves or other abnormal structures. An analysis of waveforms during photic stimulation revealed no significant changes. Impression: This is a mildly abnormal standard electroencephalogram showing no clear electroencephalographic epileptiform activity, but with some semirhythmic bifrontal slowing.  This finding could point towards a generalized metabolic/infectious encephalopathy, but could also be indicative of a postictal phase.  A longer duration EEG or clinical correlation would be required for definitive interpretation. Isauro Jaime MD Neurologist, [facility] September 5, 2024 11:33 AM       MR Brain w/o Contrast    Result Date: 9/5/2024  EXAM: MR BRAIN W/O CONTRAST LOCATION: [facility] DATE: 9/5/2024 INDICATION: AMS, confusion, seizure. COMPARISON: CTA head and neck 9/4/2024. TECHNIQUE: Routine multiplanar multisequence head MRI without intravenous contrast. FINDINGS: INTRACRANIAL  CONTENTS: No acute or subacute infarct. No mass, acute hemorrhage, or extra-axial fluid collections. Mild volume loss and presumed chronic small vessel ischemia. Normal position of the cerebellar tonsils. SELLA: No abnormality accounting for technique. OSSEOUS STRUCTURES/SOFT TISSUES: Normal marrow signal. The major intracranial vascular flow voids are maintained. ORBITS: No abnormality accounting for technique. SINUSES/MASTOIDS: No paranasal sinus mucosal disease. No middle ear or mastoid effusion.     IMPRESSION: 1.  No acute intracranial abnormality. 2.  Age-related and chronic ischemic changes.    CTA Head Neck w Contrast    Result Date: 9/4/2024  EXAM: CTA HEAD NECK W CONTRAST LOCATION: [facility] DATE: 9/4/2024 INDICATION: seizure, fall, confusion COMPARISON: None. CONTRAST: 67mL Isovue 370 TECHNIQUE: Head and neck CT angiogram with IV contrast. Axial helical CT images of the head and neck vessels obtained during the arterial phase of intravenous contrast administration. Axial 2D reconstructed images and multiplanar 3D MIP reconstructed images of the head and neck vessels were performed by the technologist. Dose reduction techniques were used. All stenosis measurements made according to NASCET criteria unless otherwise specified. FINDINGS: HEAD CTA: ANTERIOR CIRCULATION: Mild atherosclerotic changes cavernous and supraclinoid ICAs bilaterally. Standard [place] anatomy. POSTERIOR CIRCULATION: No stenosis/occlusion, aneurysm, or high flow vascular malformation. Dominant left and smaller right vertebral artery contribute to a normal basilar artery. DURAL VENOUS SINUSES: Expected enhancement of the major dural venous sinuses. NECK CTA: RIGHT CAROTID: Less than 50% narrowing. LEFT CAROTID: Less than 50% narrowing. VERTEBRAL ARTERIES: Mild narrowing at the origin of both vertebral arteries. Both vertebral arteries appear otherwise normal. Dominant left and smaller right vertebral  "arteries. AORTIC ARCH: Moderate narrowing at origin/proximal aspect of both subclavian arteries. NONVASCULAR STRUCTURES: Mild compression of T3 and moderate compression of T4, age indeterminate; appear chronic.     IMPRESSION: HEAD CTA: 1.  Mild atherosclerotic changes cavernous and supraclinoid ICAs bilaterally. 2.  Intracranial circulation appears otherwise normal. NECK CTA: 1.  No definite hemodynamically significant narrowing throughout major neck vessels.     CT Head w/o Contrast    Result Date: 9/4/2024  EXAM: CT HEAD W/O CONTRAST LOCATION: [facility] DATE: 9/4/2024 INDICATION: seizure, fall, confusion COMPARISON: None. TECHNIQUE: Routine CT Head without IV contrast. Multiplanar reformats. Dose reduction techniques were used. FINDINGS: INTRACRANIAL CONTENTS: No intracranial hemorrhage, extraaxial collection, or mass effect.  No CT evidence of acute infarct. Mild presumed chronic small vessel ischemic changes. Mild generalized volume loss. No hydrocephalus. VISUALIZED ORBITS/SINUSES/MASTOIDS: No intraorbital abnormality. Minimal amount of fluid left maxillary sinus. No middle ear or mastoid effusion. BONES/SOFT TISSUES: No acute abnormality.     IMPRESSION: 1.  No acute intracranial process.        Vitamin B12:   Lab Results   Component Value Date    B12 808 09/04/2024         Complete Blood Count:    Recent Labs   Lab Test 09/12/24  0850 09/10/24  0814 09/09/24  0716 09/06/24  1106 09/05/24  0659 09/04/24  2107   WBC 11.4*  --  7.2 8.6 7.2 7.3   RBC 4.61  --  4.22* 4.08* 4.06* 4.14*   HGB 15.1  --  14.2 13.3 13.7 13.9   HCT 42.7  --  39.5* 38.5* 38.1* 38.8*    214 205 155 118* 137*   LYMPH  --   --   --   --  25 21        HgA1c: No results found for: \"A1C\"     Thyroid Stimulating Hormone:   Lab Results   Component Value Date    TSH 2.38 09/04/2024        Recent Labs   Lab Test 09/12/24  0850 09/10/24  0814 09/09/24  0716   WBC 11.4*  --  7.2   HGB 15.1  --  14.2   MCV 93  --  " 94    214 205      Recent Labs   Lab Test 09/12/24  0850 09/12/24  0258 09/10/24  0814 09/09/24  0716     --   --  135   POTASSIUM 3.7  --   --  3.6   CHLORIDE 103  --   --  99   CO2 23  --   --  25   BUN 15.0  --   --  14.0   CR 1.01  --  0.85 0.79   ANIONGAP 15  --   --  11   ANA 9.8  --   --  9.4   * 98  --  93     CMP:  Recent Labs   Lab 09/12/24  0850   PROTTOTAL 7.8   ALBUMIN 4.4   ALKPHOS 86   AST 44   ALT 38   BILITOTAL 1.0            Assessment and Plan:     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  Patient is being evaluated by psychiatry.  I will recommend ruling out any organic causes of altered mental status with an LP to rule out any inflammatory changes in the CSF.  Patient is on Seroquel currently.  He was also noted to have PLEDs in one of his EEGs and he is currently taking Keppra 1000 mg twice a day.  His behavior has not shown any significant change since the addition of Keppra.  I will consider repeating an EEG if he continues to have significant behavioral changes with a negative LP.       Parish Russo MD  [facility]

## 2024-09-12 NOTE — PROGRESS NOTES
[id number]:  Reason for Admission: seizure    Cognitive/Mentation: A/Ox 1, to self only  Neuros/CMS: difficult to assess d/t pt inability to follow/ unwilling to follow commands.   VS: stable on RA.   Tele: NSR.  /GI: Continent. Last BM unknown.   Pulmonary: LS clear/dim.  Pain: denies pain or discomfort.     Drains/Lines: R PIVx1 SL  Skin: intact  Activity: Assist x 1 with GB/walker.  Diet: regular with thin liquids. Takes pills whole.     Discharge: pending medical stability    Aggression Stoplight Tool: green    End of shift summary: pt restless but laying in bed. Appears calm/quiet and is agreeable to cares/assessments. Neuro status is difficult to assess d/t pt inability to focus/follow commands. BROOKS purposeful, equal strength. Pupils PERLL. Sitter remains at bedside for pt safety. Scheduled Seroquel given at bedtime, Covid precautions continued. Plan to have a lumbar puncture in AM.

## 2024-09-13 ENCOUNTER — APPOINTMENT (OUTPATIENT)
Dept: GENERAL RADIOLOGY | Facility: CLINIC | Age: 69
DRG: 100 | End: 2024-09-13
Attending: INTERNAL MEDICINE
Payer: MEDICARE

## 2024-09-13 ENCOUNTER — HOSPITAL ENCOUNTER (INPATIENT)
Dept: NEUROLOGY | Facility: CLINIC | Age: 69
Discharge: HOME | DRG: 100 | End: 2024-09-13
Attending: INTERNAL MEDICINE
Payer: MEDICARE

## 2024-09-13 PROBLEM — R41.9 NEUROCOGNITIVE DISORDER: Chronic | Status: ACTIVE | Noted: 2024-09-13

## 2024-09-13 PROBLEM — F33.3 SEVERE RECURRENT MAJOR DEPRESSIVE DISORDER WITH PSYCHOTIC FEATURES (H): Chronic | Status: ACTIVE | Noted: 2024-09-13

## 2024-09-13 PROBLEM — F41.9 ANXIETY: Chronic | Status: ACTIVE | Noted: 2024-09-13

## 2024-09-13 PROBLEM — F06.1: Chronic | Status: ACTIVE | Noted: 2024-09-13

## 2024-09-13 LAB
ALBUMIN UR-MCNC: 20 MG/DL
AMMONIA PLAS-SCNC: 31 UMOL/L (ref 16–60)
AMORPH CRY #/AREA URNS HPF: ABNORMAL /HPF
APPEARANCE UR: CLEAR
BASE EXCESS BLDV CALC-SCNC: 2.1 MMOL/L (ref -3–3)
BILIRUB UR QL STRIP: NEGATIVE
COLOR UR AUTO: YELLOW
CREAT SERPL-MCNC: 0.78 MG/DL (ref 0.67–1.17)
EGFRCR SERPLBLD CKD-EPI 2021: >90 ML/MIN/1.73M2
ERYTHROCYTE [DISTWIDTH] IN BLOOD BY AUTOMATED COUNT: 12.2 % (ref 10–15)
GLUCOSE UR STRIP-MCNC: NEGATIVE MG/DL
HCO3 BLDV-SCNC: 26 MMOL/L (ref 21–28)
HCT VFR BLD AUTO: 45.3 % (ref 40–53)
HGB BLD-MCNC: 15.8 G/DL (ref 13.3–17.7)
HGB UR QL STRIP: NEGATIVE
KETONES UR STRIP-MCNC: 40 MG/DL
LEUKOCYTE ESTERASE UR QL STRIP: NEGATIVE
MCH RBC QN AUTO: 32.8 PG (ref 26.5–33)
MCHC RBC AUTO-ENTMCNC: 34.9 G/DL (ref 31.5–36.5)
MCV RBC AUTO: 94 FL (ref 78–100)
MUCOUS THREADS #/AREA URNS LPF: PRESENT /LPF
NITRATE UR QL: NEGATIVE
O2/TOTAL GAS SETTING VFR VENT: 21 %
OXYHGB MFR BLDV: 74 % (ref 70–75)
PCO2 BLDV: 39 MM HG (ref 40–50)
PH BLDV: 7.44 [PH] (ref 7.32–7.43)
PH UR STRIP: 5.5 [PH] (ref 5–7)
PLATELET # BLD AUTO: 261 10E3/UL (ref 150–450)
PLATELET # BLD AUTO: 261 10E3/UL (ref 150–450)
PO2 BLDV: 41 MM HG (ref 25–47)
PROCALCITONIN SERPL IA-MCNC: 0.04 NG/ML
RBC # BLD AUTO: 4.81 10E6/UL (ref 4.4–5.9)
RBC URINE: 4 /HPF
SAO2 % BLDV: 75.6 % (ref 70–75)
SP GR UR STRIP: 1.03 (ref 1–1.03)
SQUAMOUS EPITHELIAL: <1 /HPF
UROBILINOGEN UR STRIP-MCNC: NORMAL MG/DL
WBC # BLD AUTO: 12.5 10E3/UL (ref 4–11)
WBC URINE: 2 /HPF

## 2024-09-13 PROCEDURE — 71045 X-RAY EXAM CHEST 1 VIEW: CPT

## 2024-09-13 PROCEDURE — 85027 COMPLETE CBC AUTOMATED: CPT | Performed by: INTERNAL MEDICINE

## 2024-09-13 PROCEDURE — 82565 ASSAY OF CREATININE: CPT | Performed by: INTERNAL MEDICINE

## 2024-09-13 PROCEDURE — 250N000011 HC RX IP 250 OP 636: Performed by: INTERNAL MEDICINE

## 2024-09-13 PROCEDURE — 82140 ASSAY OF AMMONIA: CPT | Performed by: INTERNAL MEDICINE

## 2024-09-13 PROCEDURE — 82805 BLOOD GASES W/O2 SATURATION: CPT | Performed by: INTERNAL MEDICINE

## 2024-09-13 PROCEDURE — 99418 PROLNG IP/OBS E/M EA 15 MIN: CPT | Performed by: PSYCHIATRY & NEUROLOGY

## 2024-09-13 PROCEDURE — 36415 COLL VENOUS BLD VENIPUNCTURE: CPT | Performed by: INTERNAL MEDICINE

## 2024-09-13 PROCEDURE — 120N000001 HC R&B MED SURG/OB

## 2024-09-13 PROCEDURE — 250N000013 HC RX MED GY IP 250 OP 250 PS 637: Performed by: INTERNAL MEDICINE

## 2024-09-13 PROCEDURE — 99233 SBSQ HOSP IP/OBS HIGH 50: CPT | Performed by: PSYCHIATRY & NEUROLOGY

## 2024-09-13 PROCEDURE — 999N000040 HC STATISTIC CONSULT NO CHARGE VASC ACCESS

## 2024-09-13 PROCEDURE — 84145 PROCALCITONIN (PCT): CPT | Performed by: INTERNAL MEDICINE

## 2024-09-13 PROCEDURE — 999N000052 EEG 61-119 MINUTES

## 2024-09-13 PROCEDURE — 81003 URINALYSIS AUTO W/O SCOPE: CPT | Performed by: INTERNAL MEDICINE

## 2024-09-13 PROCEDURE — 250N000011 HC RX IP 250 OP 636: Performed by: PSYCHIATRY & NEUROLOGY

## 2024-09-13 PROCEDURE — 250N000013 HC RX MED GY IP 250 OP 250 PS 637: Performed by: PSYCHIATRY & NEUROLOGY

## 2024-09-13 PROCEDURE — 99233 SBSQ HOSP IP/OBS HIGH 50: CPT | Performed by: INTERNAL MEDICINE

## 2024-09-13 RX ORDER — LORAZEPAM 2 MG/ML
1 INJECTION INTRAMUSCULAR 3 TIMES DAILY
Status: DISCONTINUED | OUTPATIENT
Start: 2024-09-15 | End: 2024-09-16 | Stop reason: DRUGHIGH

## 2024-09-13 RX ORDER — OLANZAPINE 5 MG/1
5 TABLET, ORALLY DISINTEGRATING ORAL 3 TIMES DAILY PRN
Status: DISCONTINUED | OUTPATIENT
Start: 2024-09-13 | End: 2024-09-18 | Stop reason: HOSPADM

## 2024-09-13 RX ORDER — LORAZEPAM 2 MG/ML
2 INJECTION INTRAMUSCULAR 3 TIMES DAILY
Status: COMPLETED | OUTPATIENT
Start: 2024-09-13 | End: 2024-09-15

## 2024-09-13 RX ORDER — OLANZAPINE 10 MG/2ML
10 INJECTION, POWDER, FOR SOLUTION INTRAMUSCULAR 3 TIMES DAILY PRN
Status: DISCONTINUED | OUTPATIENT
Start: 2024-09-13 | End: 2024-09-18 | Stop reason: HOSPADM

## 2024-09-13 RX ORDER — LEVETIRACETAM 10 MG/ML
1000 INJECTION INTRAVASCULAR ONCE
Status: COMPLETED | OUTPATIENT
Start: 2024-09-13 | End: 2024-09-13

## 2024-09-13 RX ADMIN — FOLIC ACID 1 MG: 1 TABLET ORAL at 09:32

## 2024-09-13 RX ADMIN — LEVETIRACETAM 1000 MG: 10 INJECTION INTRAVENOUS at 22:22

## 2024-09-13 RX ADMIN — LEVETIRACETAM 1000 MG: 500 TABLET, FILM COATED ORAL at 09:30

## 2024-09-13 RX ADMIN — QUETIAPINE FUMARATE 25 MG: 25 TABLET ORAL at 09:30

## 2024-09-13 RX ADMIN — Medication 1 TABLET: at 09:31

## 2024-09-13 RX ADMIN — LIDOCAINE 2 PATCH: 560 PATCH PERCUTANEOUS; TOPICAL; TRANSDERMAL at 09:58

## 2024-09-13 RX ADMIN — LORAZEPAM 2 MG: 2 INJECTION INTRAMUSCULAR; INTRAVENOUS at 16:28

## 2024-09-13 RX ADMIN — ENOXAPARIN SODIUM 40 MG: 40 INJECTION SUBCUTANEOUS at 09:45

## 2024-09-13 RX ADMIN — LORAZEPAM 2 MG: 2 INJECTION INTRAMUSCULAR; INTRAVENOUS at 22:22

## 2024-09-13 RX ADMIN — ATORVASTATIN CALCIUM 80 MG: 80 TABLET, FILM COATED ORAL at 09:31

## 2024-09-13 ASSESSMENT — ACTIVITIES OF DAILY LIVING (ADL)
ADLS_ACUITY_SCORE: 36
ADLS_ACUITY_SCORE: 35
ADLS_ACUITY_SCORE: 38
ADLS_ACUITY_SCORE: 36
ADLS_ACUITY_SCORE: 38
ADLS_ACUITY_SCORE: 34
ADLS_ACUITY_SCORE: 37
ADLS_ACUITY_SCORE: 37
ADLS_ACUITY_SCORE: 38
ADLS_ACUITY_SCORE: 37
ADLS_ACUITY_SCORE: 34
ADLS_ACUITY_SCORE: 36
ADLS_ACUITY_SCORE: 38
ADLS_ACUITY_SCORE: 38
ADLS_ACUITY_SCORE: 34
ADLS_ACUITY_SCORE: 36
ADLS_ACUITY_SCORE: 34
ADLS_ACUITY_SCORE: 37
ADLS_ACUITY_SCORE: 34
ADLS_ACUITY_SCORE: 36
ADLS_ACUITY_SCORE: 34
ADLS_ACUITY_SCORE: 37
ADLS_ACUITY_SCORE: 34

## 2024-09-13 NOTE — PROGRESS NOTES
"Chart reviewed per LOS protocol    Nutrition Admission Screen:  Have you recently lost weight without trying? \"NO\"  Have you been eating poorly because of a decreased appetite? \"NO\"    Ht: 5'8\"  IBW: 70 kg  Wt hx:  Wt Readings from Last 10 Encounters:   09/04/24 72.6 kg (160 lb)     7/1/24: Clinic visit  73.5 kg (162 lb)  \"States that his weight has remained stable.\"    Pt in COVID isolation    No pressure injuries noted    Diet: Regular  Pt needs assist with feeding  Po intake has fluctuated    Will send a vanilla [place] drink with breakfast meal     Kalina Carpenter RD, LD  Clinical Dietitian - [facility]   Pager - [phone]    "

## 2024-09-13 NOTE — PLAN OF CARE
Reason for Admission: seizures, acute metabolic encephalopathy, and COVID+.      Cognitive/Mentation: NURIS - pt not answering questions  Neuros/CMS: Intact ex generalized weakness, confusion, forgetful,   VS: stable on RA. SBP<180.  Tele: N/A.  /GI: Continent. Last BM 9/12/2024.   Pulmonary: LS clear/diminisihed.  Pain: denies per rFLACC pain scale.     Drains/Lines: none currently. Pt pulled two IV's this shift.  Two attempts by floor nurses to place new PIV unsuccessful.  Vascular consult was ordered.    Skin: WNL ex bandage on lower back from LP.  Activity: Assist x 1 with GB/W.  Diet: regular diet with thin liquids. Pt refusing to take pills whole with water this shift.  Messaged provider to change to IV Keppra.  MD said no other meds needed to be given, so the rest were held.       Therapies recs: home w/assist/home with outpatient therapy  Discharge: pending    Aggression Stoplight Tool: yellow    End of shift summary: seizure precautions maintained.   Psych consult and OT this AM. Pt does have prn zyprexa and seroquel.

## 2024-09-13 NOTE — PROGRESS NOTES
"  Neurology Progress Note               Interval History:     Patient continues to have bizarre behavior.  He covers his face and eyes when I walk in the room.  His repeat EEG was negative for any epileptiform activity.  His spinal tap is negative for any evidence of encephalitis.              Medications:   I have reviewed this patient's current medications               Physical Exam:   Blood pressure (!) 174/90, pulse 100, temperature 98.2  F (36.8  C), temperature source Axillary, resp. rate 18, height 1.727 m (5' 8\"), weight 72.6 kg (160 lb), SpO2 94%.  GENERAL EXAMINATION:  Patient is not cooperative with exam.  He is able to move all his 4 extremities.  He partially follows commands.  Instead of lifting his leg up on command he wiggled his toes.         Data:     Review of Diagnostics:     I personally reviewed and interpreted the following:    EEG  Minutes    Result Date: 9/13/2024  Findings: Background: low amplitude mixed frequency with significant movement artifact.  Photic stimulation: Not done Hyperventilation: Not done Sleep record: None/ Other findings: No epileptiform activity or abnormal slowing. EKG-sinus tachycardia, 120 bpm. Conclusion: Normal EEG.  There is significant movement artifact masquerading as delta activity.  No epileptiform activity is noted in this recording.. Electronically Signed By: Parish Russo MD    XR Lumbar Puncture Spinal Tap Diag    Result Date: 9/12/2024  EXAM: XR LUMBAR PUNCTURE SPINAL TAP DIAGNOSTIC  9/12/2024 11:15 AM   History:  Confusion, Abnormal behavior. PROCEDURE: The patient consented for a lumbar puncture. The benefits and risks of the procedure were explained to the patient, including but not limited to worsening headache, hemorrhage, infection, lower extremity pain, or nerve root injury. The patient was sterilely prepped and draped with the patient in the supine position, over the lower back. Under fluoroscopic guidance, the interlaminar spaces were " noted. 1% lidocaine was administered for local anesthetic over the L4-5 interlaminar space, and a 20 gauge needle was advanced into the thecal sac under fluoroscopic guidance.  There was initial aspiration of clear CSF. About 10 cc's total were aspirated.  The needle was removed. There were no immediate complications associated with the procedure.   Estimated blood loss during the procedure was less than 5 mL. Opening Pressure: ~27 cm of water Fluoro time: 0.2 minutes Images Obtained: 1 Medications used: 3 mL of 1% lidocaine.     IMPRESSION: Successful lumbar puncture. No sedation needed. SUPA FRANKLIN PA-C   SYSTEM ID:  [id number]    MR Thoracic Spine w/o Contrast    Result Date: 9/9/2024  EXAM: MR THORACIC SPINE W/O CONTRAST LOCATION: [facility] DATE: 9/9/2024 INDICATION: T7  T12 fractures COMPARISON: Thoracic spine radiographs: 9/5/2024. Head/neck CTA: 9/4/2024. TECHNIQUE: Routine Thoracic Spine MRI without IV contrast. FINDINGS: Incomplete examination, terminated early due to excessive patient motion. Sagittal STIR sequence was not obtained. Superior endplate fractures at T3, T4, T5, T6, T7, T8, T9, and T12. Height loss is greatest and approximately 50% at T4. No definite marrow edema, though evaluation is limited in the absence of sagittal STIR sequence. Mild multilevel degenerative disc disease without findings to suggest high-grade spinal canal or neural foraminal stenosis within constraints of motion degradation. No definite cord signal abnormality within constraints of motion. No definite extraspinal abnormality.     IMPRESSION: 1.  Incomplete examination, terminated early due to excessive patient motion. Sagittal STIR sequence was not obtained. 2.  Superior endplate fractures at T3, T4, T5, T6, T7, T8, T9 and T12. Height loss is greatest and approximately 50% at T4. No definite marrow edema at any levels, though evaluation is substantially limited in the absence of sagittal STIR  sequence. If there is persistent clinical concern, suggest repeat study with formal sedation. 3.  No evidence for high-grade spinal canal or neural foraminal stenosis. 4.  No definite cord signal abnormality.    EEG Video 12-26 hr Unmonitored    Result Date: 9/8/2024  EEG Video 12-26 hr Unmonitored Result VIDEO EEG DATE: 9/7/24 VIDEO EEG LOG: [facility]  LTV VIDEO EEG DAY#: 2 VIDEO EEG SOURCE FILE DURATION: 19 HRS AND 10 MIN PATIENT INFORMATION: Ivan Webb is a 69 year old year old male who presents with episodic confusion. MEDICATIONS: Reviewed-See electronic health record for list of medications administered on the day of this recording.  TECHNICAL SUMMARY: EEG was recorded from 24 MRI COMP. WIRES scalp electrodes placed according to the 10-20 international system. Additional electrodes were used for referencing, EKG, and to record from other cerebral regions as appropriate. Video was continuously recorded and was reviewed for clinical correlation. Electrodes were attached and both video and EEG were monitored and annotated by qualified EEG technologists. Video-EEG was reviewed and report generated by qualified physician. BACKGROUND ACTIVITY:  During wakefulness, the background activity consists of synchronous and symmetric, well-modulated, 10 Hz posterior dominant rhythm. The posterior dominant rhythm attenuated with eye opening. During drowsiness, the background activity waxed and waned and there were periods of slowing and attenuation of the posterior alpha rhythm. ACTIVATION PROCEDURE: None. IMPRESSION OF VIDEO EEG DAY # 2: This video electroencephalogram is abnormal due to the presence of generalized intermittent slowing. This is a mildly abnormal awake and sleep video electroencephalogram which shows generalized slowing intermittently, pointing towards a mild encephalopathic etiology. No electrographic seizures or epileptiform discharges were recorded. Clinical correlation is advised. Isauro Jaime,  MD [facility] Office phone [phone]    EEG Video 12-26 hr Unmonitored    Result Date: 9/8/2024  EEG Video 12-26 hr Unmonitored Result VIDEO EEG DATE: 9/8/24 VIDEO EEG LOG: [facility]  LTV VIDEO EEG DAY#: 3 VIDEO EEG SOURCE FILE DURATION: 22 HRS AND 29 MIN PATIENT INFORMATION: Ivan Webb is a 69 year old year old male who presents with confusional episodes. MEDICATIONS: Reviewed-See electronic health record for list of medications administered on the day of this recording.  TECHNICAL SUMMARY: EEG was recorded from 24 MRI COMP. WIRES scalp electrodes placed according to the 10-20 international system. Additional electrodes were used for referencing, EKG, and to record from other cerebral regions as appropriate. Video was continuously recorded and was reviewed for clinical correlation. Electrodes were attached and both video and EEG were monitored and annotated by qualified EEG technologists. Video-EEG was reviewed and report generated by qualified physician. BACKGROUND ACTIVITY:  During wakefulness, the background activity consists of synchronous and symmetric, well-modulated,  10-11 Hz posterior dominant rhythm. The posterior dominant rhythm attenuated with eye opening. During drowsiness, the background activity waxed and waned and there were periods of slowing and attenuation of the posterior alpha rhythm.  Normal sleep patterns were recorded in which synchronous and symmetrical spindles, slow waves and K complexes were identified.  ACTIVATION PROCEDURE: None. IMPRESSION OF VIDEO EEG DAY # 3: This video electroencephalogram is abnormal due to the presence of generalized intermittent slowing.  However, no clear PLEDs, sharp and slow wave complexes, or other epileptiform patterns are identified. This is a mildly abnormal awake and sleep video electroencephalogram due to the presence of generalized encephalopathic patterns. No electrographic seizures or epileptiform discharges were recorded. Clinical  correlation is advised. Isauro Jaime MD [facility] Office phone [phone]    EEG Video 12-26 hr Unmonitored    Result Date: 9/7/2024  EEG Video 12-26 hr Unmonitored Result VIDEO EEG DATE: 9/6/24 VIDEO EEG LOG: [id number]   LTV VIDEO EEG DAY#: 1 VIDEO EEG SOURCE FILE DURATION: 21 hour and 15 min PATIENT INFORMATION: Ivan Webb is a 69 year old year old male who presents with seizures. MEDICATIONS: Reviewed-See electronic health record for list of medications administered on the day of this recording.  TECHNICAL SUMMARY: EEG was recorded from 24MRI scalp electrodes placed according to the 10-20 international system. Additional electrodes were used for referencing, EKG, and to record from other cerebral regions as appropriate. Video was continuously recorded and was reviewed for clinical correlation. Electrodes were attached and both video and EEG were monitored and annotated by qualified EEG technologists. Video-EEG was reviewed and report generated by qualified physician. BACKGROUND ACTIVITY:  During wakefulness, the background activity consists of synchronous and symmetric, well-modulated, 10 Hz posterior dominant rhythm. The posterior dominant rhythm attenuated with eye opening. During drowsiness, the background activity waxed and waned and there were periods of slowing and attenuation of the posterior alpha rhythm. Normal sleep structures were recorded during sleep. ACTIVATION PROCEDURE: Photic. IMPRESSION OF VIDEO EEG during different phases: DAY # 1: 9/5/2024: PLEDS noted in the right> left frontal leads, most prominently in the F4/C4 leads. Corresponding garbled speech and confusion noted. This pattern continues from beginning of recording until 630 pm on 9/5/2024. Day 1-2 (9/5-9/6): EEG normalizes with very rare right frontal sharp waves, but no rhythmic slowing or associated clinical abnormalities. This pattern continues until about 3 pm on day #2 (9/6/2024).  Day 2 (9/6/2024): 7pm-8pm: EEG  restarted secondary to patient's clinical status changes. Record shows frequent PLEDS and spike-wave activity in the right > left frontal regions. Video correlation shows confusion. Day 2-3 8pm-11 am: EEG normalizes again. Patient conversational/ sleeping. Corresponding EEG changes noted. Day 3: 11AM- 4pm: EEG appears normal without PLEDS or spike wave discharges noted previously. Conclusion: This EEG shows PLEDS/ spine and wave activity with alternating long phases of  normal awake and sleep video electroencephalogram recordings. Clinical correlation is advised. Isauro Jaime MD Neurologist, [facility] September 7, 2024 11:02 AM      XR Thoracic Lumbar Standing 2 Views    Result Date: 9/5/2024  EXAM: XR THORACIC LUMBAR STANDING 2 VIEWS LOCATION: [facility] DATE: 9/5/2024 INDICATION: LBP. r o fracture COMPARISON: None.     IMPRESSION: Age-indeterminate fractures at T7, T8, T9, and T12. Normal alignment. Mild multilevel disc height loss and facet hypertrophy. Aortic atherosclerotic versus.     EEG Awake or Drowsy Routine    Result Date: 9/5/2024  Table formatting from the original result was not included. Electroencephalogram report [facility]      [September 5, 2024] Technical details: Routine, surface electrode EEG, performed using 18 channels of digitally acquired EEG, with additional channels for EKG and eye leads. Standard international 10 - 20 system employed for electrode placement.  Video monitoring was not performed. Special technical modifications: None. Duration of study: 23 min. Patient arousal states studied: Awake, resting, drowsy, asleep. Activation procedures performed: Photic stimulation. Waveform description: Predominant background during the record reveals well formed, 8-9Hz alpha rhythm, noted symmetrically, and blocking well with eye opening.  Brief instances of bifrontal, sharply contoured semirhythmic polymorphic  delta/theta discharges are noted that subside into a fairly normal appearing EEG about 3 minutes into the record.  These slow waves are not noted again. Otherwise, we do not notice any unusual laterality, spike or sharp waves or other abnormal structures. An analysis of waveforms during photic stimulation revealed no significant changes. Impression: This is a mildly abnormal standard electroencephalogram showing no clear electroencephalographic epileptiform activity, but with some semirhythmic bifrontal slowing.  This finding could point towards a generalized metabolic/infectious encephalopathy, but could also be indicative of a postictal phase.  A longer duration EEG or clinical correlation would be required for definitive interpretation. Isauro Jaime MD Neurologist, [facility] September 5, 2024 11:33 AM       MR Brain w/o Contrast    Result Date: 9/5/2024  EXAM: MR BRAIN W/O CONTRAST LOCATION: [facility] DATE: 9/5/2024 INDICATION: AMS, confusion, seizure. COMPARISON: CTA head and neck 9/4/2024. TECHNIQUE: Routine multiplanar multisequence head MRI without intravenous contrast. FINDINGS: INTRACRANIAL CONTENTS: No acute or subacute infarct. No mass, acute hemorrhage, or extra-axial fluid collections. Mild volume loss and presumed chronic small vessel ischemia. Normal position of the cerebellar tonsils. SELLA: No abnormality accounting for technique. OSSEOUS STRUCTURES/SOFT TISSUES: Normal marrow signal. The major intracranial vascular flow voids are maintained. ORBITS: No abnormality accounting for technique. SINUSES/MASTOIDS: No paranasal sinus mucosal disease. No middle ear or mastoid effusion.     IMPRESSION: 1.  No acute intracranial abnormality. 2.  Age-related and chronic ischemic changes.    CTA Head Neck w Contrast    Result Date: 9/4/2024  EXAM: CTA HEAD NECK W CONTRAST LOCATION: [facility] DATE: 9/4/2024 INDICATION: seizure, fall,  confusion COMPARISON: None. CONTRAST: 67mL Isovue 370 TECHNIQUE: Head and neck CT angiogram with IV contrast. Axial helical CT images of the head and neck vessels obtained during the arterial phase of intravenous contrast administration. Axial 2D reconstructed images and multiplanar 3D MIP reconstructed images of the head and neck vessels were performed by the technologist. Dose reduction techniques were used. All stenosis measurements made according to NASCET criteria unless otherwise specified. FINDINGS: HEAD CTA: ANTERIOR CIRCULATION: Mild atherosclerotic changes cavernous and supraclinoid ICAs bilaterally. Standard [place] anatomy. POSTERIOR CIRCULATION: No stenosis/occlusion, aneurysm, or high flow vascular malformation. Dominant left and smaller right vertebral artery contribute to a normal basilar artery. DURAL VENOUS SINUSES: Expected enhancement of the major dural venous sinuses. NECK CTA: RIGHT CAROTID: Less than 50% narrowing. LEFT CAROTID: Less than 50% narrowing. VERTEBRAL ARTERIES: Mild narrowing at the origin of both vertebral arteries. Both vertebral arteries appear otherwise normal. Dominant left and smaller right vertebral arteries. AORTIC ARCH: Moderate narrowing at origin/proximal aspect of both subclavian arteries. NONVASCULAR STRUCTURES: Mild compression of T3 and moderate compression of T4, age indeterminate; appear chronic.     IMPRESSION: HEAD CTA: 1.  Mild atherosclerotic changes cavernous and supraclinoid ICAs bilaterally. 2.  Intracranial circulation appears otherwise normal. NECK CTA: 1.  No definite hemodynamically significant narrowing throughout major neck vessels.     CT Head w/o Contrast    Result Date: 9/4/2024  EXAM: CT HEAD W/O CONTRAST LOCATION: [facility] DATE: 9/4/2024 INDICATION: seizure, fall, confusion COMPARISON: None. TECHNIQUE: Routine CT Head without IV contrast. Multiplanar reformats. Dose reduction techniques were used. FINDINGS:  "INTRACRANIAL CONTENTS: No intracranial hemorrhage, extraaxial collection, or mass effect.  No CT evidence of acute infarct. Mild presumed chronic small vessel ischemic changes. Mild generalized volume loss. No hydrocephalus. VISUALIZED ORBITS/SINUSES/MASTOIDS: No intraorbital abnormality. Minimal amount of fluid left maxillary sinus. No middle ear or mastoid effusion. BONES/SOFT TISSUES: No acute abnormality.     IMPRESSION: 1.  No acute intracranial process.        Vitamin B12:   Lab Results   Component Value Date    B12 808 09/04/2024         Complete Blood Count:    Recent Labs   Lab Test 09/13/24  0821 09/12/24  0850 09/10/24  0814 09/09/24  0716 09/06/24  1106 09/05/24  0659 09/04/24  2107   WBC 12.5* 11.4*  --  7.2   < > 7.2 7.3   RBC 4.81 4.61  --  4.22*   < > 4.06* 4.14*   HGB 15.8 15.1  --  14.2   < > 13.7 13.9   HCT 45.3 42.7  --  39.5*   < > 38.1* 38.8*     261 257 214 205   < > 118* 137*   LYMPH  --   --   --   --   --  25 21    < > = values in this interval not displayed.        HgA1c: No results found for: \"A1C\"     Thyroid Stimulating Hormone:   Lab Results   Component Value Date    TSH 2.38 09/04/2024        Recent Labs   Lab Test 09/13/24  0821 09/12/24  0850   WBC 12.5* 11.4*   HGB 15.8 15.1   MCV 94 93     261 257      Recent Labs   Lab Test 09/13/24  0821 09/12/24  0850 09/12/24  0258 09/10/24  0814 09/09/24  0716   NA  --  141  --   --  135   POTASSIUM  --  3.7  --   --  3.6   CHLORIDE  --  103  --   --  99   CO2  --  23  --   --  25   BUN  --  15.0  --   --  14.0   CR 0.78 1.01  --    < > 0.79   ANIONGAP  --  15  --   --  11   ANA  --  9.8  --   --  9.4   GLC  --  129* 98  --  93    < > = values in this interval not displayed.     CMP:  Recent Labs   Lab 09/12/24  0850   PROTTOTAL 7.8   ALBUMIN 4.4   ALKPHOS 86   AST 44   ALT 38   BILITOTAL 1.0            Assessment and Plan:     Altered mental status: Patient does not have any organic etiology for his abnormal behavior.  " Patient does not have any signs of seizures or epileptiform activity on the EEG.  His LP is negative for any evidence of encephalitis.  His symptoms are most likely related to his underlying depression and psychiatric illness.  I will encourage to work with psychiatry to manage his behaviors.  I will sign off. Call neurology on call if needed.  I spent 45 minutes with the patient activities before, during and after the visit.       Parish Russo MD  [facility]

## 2024-09-13 NOTE — PROGRESS NOTES
[facility]    Medicine Progress Note - Hospitalist Service    Date of Admission:  9/4/2024    Assessment & Plan   Ivan Webb is a markedly pleasant 69 year old gentleman with past medical history that is most notable for prior seizure, who presents with acute confusion and convulsive syncope. and is found to have suspected acute seizure and acute metabolic encephalopathy.     Suspected epilepsy  Acute metabolic encephalopathy  Mr. Webb was travelling back to his home in [place] on the day of admission. He recently quit smoking several weeks ago. It seems that he was hospitalized there in 2020 for acute confusion and bizarre behavior. It was documented that he was drinking 4-6 alcoholic beverages per day at that time. While hospitalized he had a seizure. Alcohol withdrawal and/or wernicke korsakoff syndrome were suspected. No further seizures have been known to have occurred before or since.  Now, he presented for evaluation of acute confusion, culminating [place] the airport. In the ED, he was afebrile, without hypotension, tachycardia, or hypoxia. WBC was normal, as was hepatic panel. He had mild acute metabolic acidosis and thrombocytopenia as discussed below. UA as well as UDS were negative and Ethyl alcohol level was undetectable. CT and CTA of head and neck showed no acute processes.   Overall, his symptoms seemed consistent with a seizure. He could have alcohol withdrawal or a post-viral process.  Registered to observation initially, then admitted to inpatient on 9/6/24.  Neurology consulted, appreciate their assistance.  EEG on 9/5/24 showed PLEDS, resolved around 7:00 pm on 9/5/24.  MRI brain on 9/5/24 showed no acute abnormality.  Loaded with keppra on 9/5/24, then started on keppra 750 mg PO BID.  Seizure precautions.  TSH and vitamin B12 within normal limits.  Ammonia level low at 13.  Multivitamin, folic acid, and  "thiamine.  Monitor for signs of withdrawal, although suspect this is unlikely as patient's alcohol use has diminished significantly since 2020, now has one drink per day.  Discontinued Wellbutrin.  Discussed with neurology 09/08.  Continues with mild encephalopathy on EEG.  Continue with Keppra.    9/9: More confused.  Appreciate neurology evaluation.  Continue to treat toxic metabolic causes.  Started trial of IV thiamine: Required few doses of p.o. Seroquel 9/10.  LP could not be completed due to need for sedation.  Plan for 9/12 9/11: Started trial of scheduled Seroquel 25 mg in the morning and 50 mg at bedtime to help with sleep and delirium  9/12: Follow up LP results.   Continue current Seroquel pending further review by psychiatry  9/13: Clinically worse: Will get stat EEG, may consider scheduled Ativan after discussion with psychiatry if EEG is negative.      LBP: Age-indeterminate fractures at T7, T8, T9, and T12   -MRI from 09/09 reviewed.  Appreciate neurosurgery review \"Thoracic fractures likely chronic in nature clinically. Recommend patient follow up with PCP in [place] if ongoing or worsening symptoms.    No bracing necessary. \"     Recent COVID-19 infection  Patient developed a cough, fever, and sneezes about 1 week prior to admission.  Wife subsequently developed the same symptoms.  COVID-19 PCR was positive on 9/5/2024.   not hypoxic.    Acute metabolic acidosis  Suspect due to hypovolemia: Given IV fluid in the ED.  Resolved after IV fluids.     Thrombocytopenia, acute- resolved   Mild, platelets 137 initially. Had been normal on 6/17/24.  Could be due to acute illness.  Platelets down to 118 on 9/5     Hyperlipidemia  Continue PTA atorvastatin.     Tobacco use  Has been on Wellbutrin for a while (patient and wife not sure exactly how long) for tobacco cessation.  They state he stopped smoking about 2 weeks ago.  stopped Wellbutrin as it can lower the seizure threshold.     History of " prostate cancer  Status post prostactectomy.   Noted.        Diet: Regular Diet Adult    DVT Prophylaxis: Pneumatic Compression Devices  Kennedy Catheter: Not present  Lines: None     Cardiac Monitoring: None  Code Status: Full Code      Clinically Significant Risk Factors                                # Financial/Environmental Concerns: none             Disposition Plan     Medically Ready for Discharge: Anticipated Tomorrow         Estiven Randall MD  Hospitalist Service  [facility]  Securely message with [employer] (more info)  Text page via [employer] Paging/Directory   The above note was dictated using voice recognition software. Although reviewed after completion, some word and grammatical error may remain . Please contact the author for any clarifications.  ______________________________________________________________________    Interval History   Confusion is worse.  Did not take any oral medications last night due to agitation.  This morning, minimal mumbling  Physical Exam   Vital Signs: Temp: 98.2  F (36.8  C) Temp src: Axillary BP: (!) 174/90 Pulse: 100   Resp: 18 SpO2: 94 % O2 Device: None (Room air)    Weight: 160 lbs 0 oz    CVS- I+II+ no m/r/g  RS- no tachypnea  Abdo- soft, no tenderness .   Ext- no edema   CNS-mumbling  Medical Decision Making       52 MINUTES SPENT BY ME on the date of service doing chart review, history, exam, documentation & further activities per the note.  Discussion with patient, neurology, psychiatry    Data   ------------------------- PAST 24 HR DATA REVIEWED -----------------------------------------------    I have personally reviewed the following data over the past 24 hrs:    12.5 (H)  \   15.8   / 261; 261     N/A N/A N/A /  N/A   N/A N/A 0.78 \     Procal: 0.04 CRP: N/A Lactic Acid: N/A         Imaging results reviewed over the past 24 hrs:   No results found for this or any previous visit (from the past 24 hour(s)).

## 2024-09-13 NOTE — CONSULTS
"[facility]     INITIAL PSYCHIATRY   CONSULT     DATE OF SERVICE   9/13/2024       IDENTIFICATION   Ivan Webb  Age: 69 year old  MRN# [id number]   YOB: 1955        CHIEF COMPLAINT   \"No.\"       CONSULT REQUEST BY   Kelly Laguerre MD re: Psychosis.       HISTORY OF PRESENT ILLNESS   This is a 69 year old male with history of major depressive disorder with psychosis, brief psychotic episode, known neurocognitive disorder, and previous episode of nonepileptiform activity.  Chart reviewed in detail, patient has presentation of bizarre behavior in electronic chart review requiring psychiatric admission in August 2020.  Now, presenting secondary to recurrent episode demonstrated while at [facility] when connecting for hide home to [place] with wife and demonstrating behavior of seizure-like episode on 9/4.    Consult placed to psychiatry regarding psychosis and possibility of catatonia.    In brief, patient admitted to this facility's ED on 9/4 post seizure evaluation.  Patient is from [place], [place].  Traveling through [facility] and reportedly had a seizure-like episode when traveling with wife.  Described as falling backward with tonic/clonic behavior without closed head injury.  Description of a, \"convulsive syncope.\"  During ED evaluation, no recollection of events and O2 sats in the 80s.  Prior to event, wife described earlier behaviors as unusual.  Further concerns of excessive or preceding alcohol intake described with unclear pattern of use.  Found to be COVID-positive.  Initial recommendation admission to manage for alcohol withdrawal, seizure with Keppra and discontinue PTA Wellbutrin, management of bizarre behavior due to recurrent event.  Initially, thought to be improved with efforts of management.  On 9/9, increased confusion demonstrated.  Neurology involved throughout an EEG monitoring thought to be negative at the end.  On 9/11, initiated on low-dose trial " Seroquel with low-dose as needed Zyprexa to target psychosis.  Consult to psychiatry placed as ongoing medical workup continued to include LP, which was found to be negative.    Care coordination performed in detail.  Reviewed available electronic mental health information.  Admitted for her first psychiatric hospitalization from 8/20 through 9/4/2020 at [facility].  Primary diagnosis of severe episode of recurrent major depressive disorder with psychotic features.  Admit secondary to bizarre behaviors and inability to care for self.  Occurring in the setting of recent move where patient became increasingly anxious to the point of inability to stay still.  Described as continually moving.  Movement described as nonsensical, opening and closing doors, putting shoes on, then off.  Described as disoriented.  Further unusual behavior such as opening the car door 4 times while in motion before hospitalized.  Preoccupying thoughts and behaviors.  Working as a  until 8/2.  Unable to answer questions related to 200 or amount of alcohol consumed.  Later described as drinking 4-6 beers daily and sometimes whiskey.  Prior to admission, started on Zoloft by PCP and clonazepam.  Initially, felt to be helpful.  Then, described as worse.  Further stress described as (at home in July to downsize with issues of delays in building process of their smaller home which was felt to contribute to his stress level which patient acknowledges.  Patient was discharged home on trazodone 50 mg at bedtime for insomnia associated with depression and outpatient geriatrician referral and neurocognitive testing recommended.  While hospitalized, EEG evaluated and normal.  CT brain normal.  MRI brain compatible with small vessel ischemic disease.  Felt to have mixed form of neurocognitive disorder.    Upon interview, the patient is not cooperative on approach.  Visit performed in patient's room on the unit with attendant in  place.   Patient is not voluntary.    Unable to obtain meaningful information directly from patient.  Information gathered by means of chart review, nursing/physician/social work report and patient observation.  Serial discussion performed with hospitalist.  Medical presentation ruled out and mutual agreement that presentation was consistent with catatonia.  Plan initiated to start Ativan IV.    Care coordination performed in detail with hospitalist.  Most recent medical workup completed and LP results felt to be negative.  Described his clinically worsening and a stat EEG obtained.  Discussed scheduled Ativan with monitoring/    Review of external notes and/or information:  I personally reviewed notes from the patient's admit note dated 9/5. This provided me with information regarding patient's recent clinical course.     I personally reviewed the patient's chart, including available medication list and available past medical history, past surgical history, family history, and social history.        CHEMICAL DEPENDENCY HISTORY   History   Drug Use Not on file     Social History    Substance and Sexual Activity      Alcohol use: Yes    History   Smoking Status     Former     Types: Cigarettes   Smokeless Tobacco     Not on file     Treatment: None reported  Detox: No withdrawal seizure reported  Legal: None reported       PAST PSYCHIATRIC HISTORY   Hospitalizations: One prior hospitalization in 2020 for similar episode.  History of Commitment: None  Past Medications: Trazodone, Klonopin, Zoloft  Suicide Attempts/Gun Access: None reported       PAST MEDICAL HISTORY   Past Medical History:   Diagnosis Date     Depression      History of colonic polyps      History of diverticulitis      HTN (hypertension)      Hyperlipidemia      Nephrolithiasis      Prostate cancer (H)      Pulmonary nodules      Seizure (H) 2020     Thrombosed external hemorrhoids      Past Surgical History:   Procedure Laterality Date      COLONOSCOPY       PROSTATECTOMY       Primary Care Provider: No Ref-Primary, Physician  Medications:   Current Facility-Administered Medications   Medication Dose Route Frequency Provider Last Rate Last Admin     atorvastatin (LIPITOR) tablet 80 mg  80 mg Oral Daily Estiven Randall MD   80 mg at 09/13/24 0931     [Held by provider] buPROPion (WELLBUTRIN XL) 24 hr tablet 150 mg  150 mg Oral QAM Estiven Randall MD         enoxaparin ANTICOAGULANT (LOVENOX) injection 40 mg  40 mg Subcutaneous Q24H Estiven Randall MD   40 mg at 09/13/24 0945     folic acid (FOLVITE) tablet 1 mg  1 mg Oral Daily Estiven Randall MD   1 mg at 09/13/24 0932     levETIRAcetam (KEPPRA) tablet 1,000 mg  1,000 mg Oral BID Shar Basilio MD   1,000 mg at 09/13/24 0930     Lidocaine (LIDOCARE) 4 % Patch 2 patch  2 patch Transdermal Q24H Kerline Fraire MD   2 patch at 09/13/24 0958     LORazepam (ATIVAN) injection 2 mg  2 mg Intravenous TID Freddy Kong MD        Followed by     [START ON 9/15/2024] LORazepam (ATIVAN) injection 1 mg  1 mg Intravenous TID Freddy Kong MD         melatonin tablet 5 mg  5 mg Oral At Bedtime Estiven Randall MD         multivitamin w/minerals (THERA-VIT-M) tablet 1 tablet  1 tablet Oral Daily Estiven Randall MD   1 tablet at 09/13/24 0931     [Held by provider] QUEtiapine (SEROquel) half-tab 12.5 mg  12.5 mg Oral BID Estiven Randall MD         thiamine (B-1) injection 250 mg  250 mg Intravenous Daily Estiven Randall MD   250 mg at 09/12/24 0850    Followed by     [START ON 9/16/2024] thiamine (B-1) tablet 100 mg  100 mg Oral Daily Estiven Randall MD         Medications as needed:   Current Facility-Administered Medications   Medication Dose Route Frequency Provider Last Rate Last Admin     acetaminophen (TYLENOL) tablet 650 mg  650 mg Oral Q4H PRN Nikolay Gore MD   650 mg at 09/11/24 0351    Or     acetaminophen (TYLENOL) Suppository 650 mg  650 mg Rectal Q4H PRN Nikolay Gore,  MD         benzocaine-menthol (CHLORASEPTIC) 6-10 MG lozenge 1 lozenge  1 lozenge Buccal Q1H PRN Nikolay Gore MD   1 lozenge at 09/05/24 0316     guaiFENesin-dextromethorphan (ROBITUSSIN DM) 100-10 MG/5ML syrup 10 mL  10 mL Oral Q4H PRN Estiven Randall MD   10 mL at 09/11/24 1335     LORazepam (ATIVAN) injection 0.5 mg  0.5 mg Intravenous Once PRN Estiven Randall MD         LORazepam (ATIVAN) injection 2 mg  2 mg Intravenous Q3 Min PRN Nikolay Gore MD   2 mg at 09/09/24 1822     melatonin tablet 3 mg  3 mg Oral At Bedtime PRN Nikolay Gore MD   3 mg at 09/10/24 2121     OLANZapine zydis (zyPREXA) ODT tab 5 mg  5 mg Oral TID PRN Freddy Kong MD        Or     OLANZapine (zyPREXA) injection 10 mg  10 mg Intramuscular TID PRN Freddy Kong MD         ondansetron (ZOFRAN ODT) ODT tab 4 mg  4 mg Oral Q6H PRN Nikolay Gore MD        Or     ondansetron (ZOFRAN) injection 4 mg  4 mg Intravenous Q6H PRN Nikolay Gore MD         senna-docusate (SENOKOT-S/PERICOLACE) 8.6-50 MG per tablet 1 tablet  1 tablet Oral BID PRN Nikolay Gore MD   1 tablet at 09/08/24 1305    Or     senna-docusate (SENOKOT-S/PERICOLACE) 8.6-50 MG per tablet 2 tablet  2 tablet Oral BID PRN Nikolay Gore MD   2 tablet at 09/09/24 1337     ALLERGIES: Patient has no known allergies.    Reviewed in detail and see ED and Intake for full details and history.       MEDICATIONS   Medications Prior to Admission   Medication Sig Dispense Refill Last Dose     atorvastatin (LIPITOR) 80 MG tablet Take 80 mg by mouth daily.   9/4/2024 at AM     buPROPion (WELLBUTRIN XL) 150 MG 24 hr tablet Take 150 mg by mouth every morning.   9/4/2024 at AM     folic acid (FOLVITE) 1 MG tablet Take 1 mg by mouth daily.        multivitamin w/minerals (CERTAVITE/ANTIOXIDANTS) tablet Take 1 tablet by mouth daily.        thiamine (B-1) 100 MG tablet Take 100 mg by mouth daily.         Medication adherence  issues: MS Med Adherence Y/N: No  Medication side effects: MEDICATION SIDE EFFECTS:  PTA Wellbutrin:  Discontinued due to seizure concerns  Benefit: Yes / No: Unknown       ROS   The 10 point Review of Systems is negative other than noted in the HPI or here.       FAMILY HISTORY   Family History   Problem Relation Age of Onset     Cancer Mother      Cancer Father          SOCIAL HISTORY   Social History     Socioeconomic History     Marital status:      Spouse name: Not on file     Number of children: Not on file     Years of education: Not on file     Highest education level: Not on file   Occupational History     Not on file   Tobacco Use     Smoking status: Former     Types: Cigarettes     Smokeless tobacco: Not on file   Substance and Sexual Activity     Alcohol use: Yes     Drug use: Not on file     Sexual activity: Not on file   Other Topics Concern     Not on file   Social History Narrative     Not on file     Social Determinants of Health     Financial Resource Strain: Low Risk  (9/7/2024)    Financial Resource Strain      Within the past 12 months, have you or your family members you live with been unable to get utilities (heat, electricity) when it was really needed?: No   Food Insecurity: Low Risk  (9/7/2024)    Food Insecurity      Within the past 12 months, did you worry that your food would run out before you got money to buy more?: No      Within the past 12 months, did the food you bought just not last and you didn t have money to get more?: No   Transportation Needs: Low Risk  (9/7/2024)    Transportation Needs      Within the past 12 months, has lack of transportation kept you from medical appointments, getting your medicines, non-medical meetings or appointments, work, or from getting things that you need?: No   Physical Activity: Not on file   Stress: Not on file   Social Connections: Not on file   Interpersonal Safety: High Risk (9/7/2024)    Interpersonal Safety      Do you feel  "physically and emotionally safe where you currently live?: No      Within the past 12 months, have you been hit, slapped, kicked or otherwise physically hurt by someone?: No      Within the past 12 months, have you been humiliated or emotionally abused in other ways by your partner or ex-partner?: No   Housing Stability: Low Risk  (9/7/2024)    Housing Stability      Do you have housing? : Yes      Are you worried about losing your housing?: No     Marital Status:   Children: 2 step children  Living Situation: Living arrangements - the patient lives with their spouse       MENTAL STATUS EXAM   Appearance: Uncooperative  Mood:  {Mood: Irritable   Affect: labile  was congruent to speech  Suicidal Ideation: PRESENT / ABSENT: absent   Homicidal Ideation: PRESENT / ABSENT: absent   Thought process: disorganized and no AMY.  Thought content: devoid of  suicidal ideation and violent ideation.   Fund of Knowledge: Unable to fully assess  Attention/Concentration: Poor  Language ability:  Intact  Memory: Impaired  Insight:  limited.  Judgement: limited  Orientation: 0  Psychomotor Behavior: agitated    Muscle Strength and Tone: MuscleStrength: Abnormal movements  Gait and Station:  In bed       PHYSICAL EXAM   Vitals: BP (!) 174/90 (BP Location: Left arm)   Pulse 100   Temp 98.2  F (36.8  C) (Axillary)   Resp 18   Ht 1.727 m (5' 8\")   Wt 72.6 kg (160 lb)   SpO2 94%   BMI 24.33 kg/m    Weight:   160 lbs 0 oz    Body mass index is 24.33 kg/m .    Physical exam as per HospitalistDr. Gore.  Dated 9/5//2024.     Constitutional: Alert and oriented to person only; no apparent distress  Respiratory: lungs clear to auscultation bilaterally  Cardiovascular: regular S1 S2  GI: abdomen soft non tender non distended bowel sounds positive  Musculoskeletal: no clubbing, cyanosis or edema  Neurologic: extra-ocular muscles intact; moves all four extremities; no nystagmus     I have reviewed the physical exam as documented " by by the medical team and agree with findings and assessment and have no additional findings to add at this time.       LABS   personally reviewed.   Recent Results (from the past 48 hour(s))   Glucose by meter    Collection Time: 09/12/24  2:58 AM   Result Value Ref Range    GLUCOSE BY METER POCT 98 70 - 99 mg/dL   CBC with platelets    Collection Time: 09/12/24  8:50 AM   Result Value Ref Range    WBC Count 11.4 (H) 4.0 - 11.0 10e3/uL    RBC Count 4.61 4.40 - 5.90 10e6/uL    Hemoglobin 15.1 13.3 - 17.7 g/dL    Hematocrit 42.7 40.0 - 53.0 %    MCV 93 78 - 100 fL    MCH 32.8 26.5 - 33.0 pg    MCHC 35.4 31.5 - 36.5 g/dL    RDW 12.3 10.0 - 15.0 %    Platelet Count 257 150 - 450 10e3/uL   Comprehensive metabolic panel    Collection Time: 09/12/24  8:50 AM   Result Value Ref Range    Sodium 141 135 - 145 mmol/L    Potassium 3.7 3.4 - 5.3 mmol/L    Carbon Dioxide (CO2) 23 22 - 29 mmol/L    Anion Gap 15 7 - 15 mmol/L    Urea Nitrogen 15.0 8.0 - 23.0 mg/dL    Creatinine 1.01 0.67 - 1.17 mg/dL    GFR Estimate 80 >60 mL/min/1.73m2    Calcium 9.8 8.8 - 10.4 mg/dL    Chloride 103 98 - 107 mmol/L    Glucose 129 (H) 70 - 99 mg/dL    Alkaline Phosphatase 86 40 - 150 U/L    AST 44 0 - 45 U/L    ALT 38 0 - 70 U/L    Protein Total 7.8 6.4 - 8.3 g/dL    Albumin 4.4 3.5 - 5.2 g/dL    Bilirubin Total 1.0 <=1.2 mg/dL   Glucose CSF:    Collection Time: 09/12/24 11:10 AM   Result Value Ref Range    Glucose CSF 68 40 - 70 mg/dL   Protein total CSF:    Collection Time: 09/12/24 11:10 AM   Result Value Ref Range    Protein total CSF 41.0 15.0 - 45.0 mg/dL   Cerebrospinal fluid Aerobic Bacterial Culture Routine With Gram Stain    Collection Time: 09/12/24 11:12 AM    Specimen: Lumbar Puncture; Cerebrospinal fluid   Result Value Ref Range    Culture No growth, less than 1 day     Gram Stain Result No organisms seen     Gram Stain Result No white blood cells seen    Meningitis/Encephalitis Panel Qual PCR CSF:    Collection Time: 09/12/24 11:12  AM   Result Value Ref Range    Escherichia coli K1 Negative Negative    Haemophilus influenzae Negative Negative    Listeria monocytogenes Negative Negative    Neisseria meningitidis Negative Negative    Streptococcus agalactiae (GBS) Negative Negative    Streptococcus pneumoniae Negative Negative    Cytomegalovirus Negative Negative    Enterovirus Negative Negative    Herpes simplex virus 1 Negative Negative    Herpes simplex virus 2 Negative Negative    Human Herpes Virus 6 Negative Negative    Human parechovirus Negative Negative    Varicella zoster virus Negative Negative    Cryptococcus neoformans/gattii Negative Negative   Cell Count CSF    Collection Time: 09/12/24 11:12 AM   Result Value Ref Range    Tube Number 4     Color Colorless Colorless    Clarity Clear Clear    Total Nucleated Cells 2 0 - 5 /uL    RBC Count 0 0 - 2 /uL   [facility]; PAC1; Paraneoplastic autoantibody evaluation CSF (Laboratory Miscellaneous Order)    Collection Time: 09/12/24 11:13 AM   Result Value Ref Range    Specimen Status       Specimen received. Reordered and sent to performing laboratory. Report to follow upon completion.    [email or web address]     Test Name Paraneoplastic autoantibody evaluation CSF     Test Code PAC1    Platelet count    Collection Time: 09/13/24  8:21 AM   Result Value Ref Range    Platelet Count 261 150 - 450 10e3/uL   Creatinine    Collection Time: 09/13/24  8:21 AM   Result Value Ref Range    Creatinine 0.78 0.67 - 1.17 mg/dL    GFR Estimate >90 >60 mL/min/1.73m2   CBC with platelets    Collection Time: 09/13/24  8:21 AM   Result Value Ref Range    WBC Count 12.5 (H) 4.0 - 11.0 10e3/uL    RBC Count 4.81 4.40 - 5.90 10e6/uL    Hemoglobin 15.8 13.3 - 17.7 g/dL    Hematocrit 45.3 40.0 - 53.0 %    MCV 94 78 - 100 fL    MCH 32.8 26.5 - 33.0 pg    MCHC 34.9 31.5 - 36.5 g/dL    RDW 12.2 10.0 - 15.0 %    Platelet Count 261 150 - 450 10e3/uL   Procalcitonin    Collection  "Time: 09/13/24  8:21 AM   Result Value Ref Range    Procalcitonin 0.04 <0.50 ng/mL   UA with Microscopic reflex to Culture    Collection Time: 09/13/24  1:36 PM    Specimen: Urine, Midstream   Result Value Ref Range    Color Urine Yellow Colorless, Straw, Light Yellow, Yellow    Appearance Urine Clear Clear    Glucose Urine Negative Negative mg/dL    Bilirubin Urine Negative Negative    Ketones Urine 40 (A) Negative mg/dL    Specific Gravity Urine 1.028 1.003 - 1.035    Blood Urine Negative Negative    pH Urine 5.5 5.0 - 7.0    Protein Albumin Urine 20 (A) Negative mg/dL    Urobilinogen Urine Normal Normal, 2.0 mg/dL    Nitrite Urine Negative Negative    Leukocyte Esterase Urine Negative Negative    Mucus Urine Present (A) None Seen /LPF    Amorphous Crystals Urine Few (A) None Seen /HPF    RBC Urine 4 (H) <=2 /HPF    WBC Urine 2 <=5 /HPF    Squamous Epithelials Urine <1 <=1 /HPF   Ammonia    Collection Time: 09/13/24  2:22 PM   Result Value Ref Range    Ammonia 31 16 - 60 umol/L   Blood gas venous    Collection Time: 09/13/24  2:22 PM   Result Value Ref Range    pH Venous 7.44 (H) 7.32 - 7.43    pCO2 Venous 39 (L) 40 - 50 mm Hg    pO2 Venous 41 25 - 47 mm Hg    Bicarbonate Venous 26 21 - 28 mmol/L    Base Excess/Deficit Venous 2.1 -3.0 - 3.0 mmol/L    FIO2 21     Oxyhemoglobin Venous 74 70 - 75 %    O2 Sat, Venous 75.6 (H) 70.0 - 75.0 %     No results found for: \"PHENYTOIN\", \"PHENOBARB\", \"VALPROATE\", \"CBMZ\"       ASSESSMENT   Consult to psychiatry performed regarding psychosis and involuntary movements.  Unable to obtain meaningful information directly from patient.  Detailed chart reviewed.  Patient has prior psychiatric hospitalization in 2020 of similar presentation.  Occurring in the setting of multiple stressors related to new home build with need to relocate, recently stopping ork and possibly alcohol use.  Stabilized on minimal medication to include trazodone for sleep.    Now, admit due to an apparent " recurrent episode of involuntary movement in setting of stressors while traveling and known history of depression.  Possible medication precipitant, Wellbutrin, to his seizure like episode occurring prior to admission discontinued all efforts of neurological and medical workup felt to be negative.  Signs and symptoms of depression with catatonia with indication to start trial of Ativan to target symptoms of recurrent depression with excited catatonia.       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     Safety/Supervision:  Legal Status: voluntary  Precautions placed:   Seizure, Falls, Delirium .    Medication Recommendations:   Outpatient Psychiatric medications were continued with the exception of Wellbutrin XL.  Ativan:  Trial Ativan 2 mg IV x 2 dose today, then 3 dose tomorrow for agitated catatonia in setting fo MDD, recurrent, with psychosis, then 1 mg IV tid.  Will follow up on 9/16 to review efficacy/tolerability.  May convert to oral when able.  Melatonin:  Continue Melatonin 5 mg at bedtime .  Zyprexa ODT/IM:  PRN Zyprexa ODT 5 mg tid/Zyprexa 5-10 mg IM TID, agitation/aggression.  Clonidine:  Consider Clonidine 0.1 mg daily to tid for acute stabilization, agitation/impulsivity/BP elevation.  Seroquel: Discontinue Seroquel trial due to limited response.    Keppra:  Hospital loading and scheduled dosing for seizure management.  Possible association with worsening of mood  and irritability.  Consider switch to Depakote ER or similar if able.    Acute Medical Problems and Treatments:  History and Physical (9/5):  Reviewed and discussed with patient.  Consults:  Neurology, reviewed and discussed with staff.  UA negative  EEG negative    Behavioral/Psychological/Social:  Barriers to Discharge: Medical vs Behavioral  Referrals:  Patient/Family to coordinate in [place]  Anticipated Placement:  home with family    Care Coordination:  Treatment plan discussed directly with staff.  Psychiatry to follow on Monday, 9/16        Risk Assessment: IP [facility]AC RISK ASSESSMENT: Patient on precautions    Total encounter time:  A total of  87  minutes spent related to chart review, history and exam, documentation   and further activities as noted above    This note was created with help of Dragon dictation system. Grammatical / typing errors are not intentional.        Freddy Kong MD - 09/13/2024  - 3:33 PM  Consult/Liaison Psychiatry   [facility]

## 2024-09-14 LAB
ALB CSF/SERPL: 7 RATIO
ALBUMIN CSF-MCNC: 30 MG/DL
ALBUMIN SERPL-MCNC: 4266 MG/DL
ANION GAP SERPL CALCULATED.3IONS-SCNC: 10 MMOL/L (ref 7–15)
BUN SERPL-MCNC: 28.1 MG/DL (ref 8–23)
CALCIUM SERPL-MCNC: 9.8 MG/DL (ref 8.8–10.4)
CHLORIDE SERPL-SCNC: 104 MMOL/L (ref 98–107)
CREAT SERPL-MCNC: 0.88 MG/DL (ref 0.67–1.17)
EGFRCR SERPLBLD CKD-EPI 2021: >90 ML/MIN/1.73M2
ERYTHROCYTE [DISTWIDTH] IN BLOOD BY AUTOMATED COUNT: 12.4 % (ref 10–15)
GLUCOSE SERPL-MCNC: 156 MG/DL (ref 70–99)
HCO3 SERPL-SCNC: 27 MMOL/L (ref 22–29)
HCT VFR BLD AUTO: 41.3 % (ref 40–53)
HGB BLD-MCNC: 14 G/DL (ref 13.3–17.7)
IGG CSF-MCNC: 3.5 MG/DL
IGG SERPL-MCNC: 1066 MG/DL
IGG SYNTH RATE SER+CSF CALC-MRATE: <0 MG/D
IGG/ALB CLEAR SER+CSF-RTO: 0.47 RATIO
IGG/ALB CSF: 0.12 RATIO
MCH RBC QN AUTO: 32.1 PG (ref 26.5–33)
MCHC RBC AUTO-ENTMCNC: 33.9 G/DL (ref 31.5–36.5)
MCV RBC AUTO: 95 FL (ref 78–100)
OLIGOCLONAL BANDS CSF ELPH-IMP: NEGATIVE
OLIGOCLONAL BANDS CSF ELPH-IMP: NORMAL
OLIGOCLONAL BANDS CSF IEF: NORMAL BANDS
PLATELET # BLD AUTO: 224 10E3/UL (ref 150–450)
POTASSIUM SERPL-SCNC: 3.9 MMOL/L (ref 3.4–5.3)
RBC # BLD AUTO: 4.36 10E6/UL (ref 4.4–5.9)
SODIUM SERPL-SCNC: 141 MMOL/L (ref 135–145)
WBC # BLD AUTO: 9.4 10E3/UL (ref 4–11)

## 2024-09-14 PROCEDURE — 80048 BASIC METABOLIC PNL TOTAL CA: CPT | Performed by: INTERNAL MEDICINE

## 2024-09-14 PROCEDURE — 120N000001 HC R&B MED SURG/OB

## 2024-09-14 PROCEDURE — 250N000011 HC RX IP 250 OP 636: Performed by: PSYCHIATRY & NEUROLOGY

## 2024-09-14 PROCEDURE — 85027 COMPLETE CBC AUTOMATED: CPT | Performed by: INTERNAL MEDICINE

## 2024-09-14 PROCEDURE — 250N000011 HC RX IP 250 OP 636: Performed by: INTERNAL MEDICINE

## 2024-09-14 PROCEDURE — 36415 COLL VENOUS BLD VENIPUNCTURE: CPT | Performed by: INTERNAL MEDICINE

## 2024-09-14 PROCEDURE — 250N000013 HC RX MED GY IP 250 OP 250 PS 637: Performed by: INTERNAL MEDICINE

## 2024-09-14 PROCEDURE — 99233 SBSQ HOSP IP/OBS HIGH 50: CPT | Performed by: INTERNAL MEDICINE

## 2024-09-14 PROCEDURE — 250N000013 HC RX MED GY IP 250 OP 250 PS 637: Performed by: PSYCHIATRY & NEUROLOGY

## 2024-09-14 RX ORDER — LEVETIRACETAM 10 MG/ML
1000 INJECTION INTRAVASCULAR ONCE
Status: COMPLETED | OUTPATIENT
Start: 2024-09-14 | End: 2024-09-14

## 2024-09-14 RX ADMIN — ATORVASTATIN CALCIUM 80 MG: 80 TABLET, FILM COATED ORAL at 09:38

## 2024-09-14 RX ADMIN — ENOXAPARIN SODIUM 40 MG: 40 INJECTION SUBCUTANEOUS at 09:38

## 2024-09-14 RX ADMIN — LEVETIRACETAM 1000 MG: 10 INJECTION INTRAVENOUS at 21:52

## 2024-09-14 RX ADMIN — LORAZEPAM 2 MG: 2 INJECTION INTRAMUSCULAR; INTRAVENOUS at 18:53

## 2024-09-14 RX ADMIN — LORAZEPAM 2 MG: 2 INJECTION INTRAMUSCULAR; INTRAVENOUS at 09:38

## 2024-09-14 RX ADMIN — FOLIC ACID 1 MG: 1 TABLET ORAL at 09:38

## 2024-09-14 RX ADMIN — LIDOCAINE 2 PATCH: 560 PATCH PERCUTANEOUS; TOPICAL; TRANSDERMAL at 09:38

## 2024-09-14 RX ADMIN — LEVETIRACETAM 1000 MG: 500 TABLET, FILM COATED ORAL at 09:38

## 2024-09-14 RX ADMIN — Medication 1 TABLET: at 09:38

## 2024-09-14 RX ADMIN — THIAMINE HYDROCHLORIDE 250 MG: 100 INJECTION, SOLUTION INTRAMUSCULAR; INTRAVENOUS at 13:11

## 2024-09-14 ASSESSMENT — ACTIVITIES OF DAILY LIVING (ADL)
ADLS_ACUITY_SCORE: 41
ADLS_ACUITY_SCORE: 37
ADLS_ACUITY_SCORE: 41
ADLS_ACUITY_SCORE: 37
ADLS_ACUITY_SCORE: 41
ADLS_ACUITY_SCORE: 37
ADLS_ACUITY_SCORE: 41
ADLS_ACUITY_SCORE: 37
ADLS_ACUITY_SCORE: 41
ADLS_ACUITY_SCORE: 37
ADLS_ACUITY_SCORE: 41

## 2024-09-14 NOTE — PROGRESS NOTES
[id number]    Slept well overnight. Continues to be somnolent, often falling asleep while conversing with him. Following some simple commands, but unable to participate for most of neuro exam. Unable to assess orientation. No signs of pain. VSS on room air. Didn't ambulate, eat or drink during the night due to somnolence. Incontinent of urine. Psychiatry following. Continues on scheduled Ativan.

## 2024-09-14 NOTE — PROGRESS NOTES
[facility]    Medicine Progress Note - Hospitalist Service    Date of Admission:  9/4/2024    Assessment & Plan   Ivan Webb is a markedly pleasant 69 year old gentleman with past medical history that is most notable for prior seizure, who presents with acute confusion and convulsive syncope. and is found to have suspected acute seizure and acute metabolic encephalopathy.     Suspected epilepsy  Acute metabolic encephalopathy  Mr. Webb was travelling back to his home in [place] on the day of admission. He recently quit smoking several weeks ago. It seems that he was hospitalized there in 2020 for acute confusion and bizarre behavior. It was documented that he was drinking 4-6 alcoholic beverages per day at that time. While hospitalized he had a seizure. Alcohol withdrawal and/or wernicke korsakoff syndrome were suspected. No further seizures have been known to have occurred before or since.  Now, he presented for evaluation of acute confusion, culminating [place]. In the ED, he was afebrile, without hypotension, tachycardia, or hypoxia. WBC was normal, as was hepatic panel. He had mild acute metabolic acidosis and thrombocytopenia as discussed below. UA as well as UDS were negative and Ethyl alcohol level was undetectable. CT and CTA of head and neck showed no acute processes.   Overall, his symptoms seemed consistent with a seizure. He could have alcohol withdrawal or a post-viral process.  Registered to observation initially, then admitted to inpatient on 9/6/24.  Neurology consulted, appreciate their assistance.  EEG on 9/5/24 showed PLEDS, resolved around 7:00 pm on 9/5/24.  MRI brain on 9/5/24 showed no acute abnormality.  Loaded with keppra on 9/5/24, then started on keppra 750 mg PO BID.  Seizure precautions.  TSH and vitamin B12 within normal limits.  Ammonia level low at 13.  Multivitamin, folic acid, and  "thiamine.  Monitor for signs of withdrawal, although suspect this is unlikely as patient's alcohol use has diminished significantly since 2020, now has one drink per day.  Discontinued Wellbutrin.  Discussed with neurology 09/08.  Continues with mild encephalopathy on EEG.  Continue with Keppra.    9/9: More confused.  Appreciate neurology evaluation.  Continue to treat toxic metabolic causes.  Started trial of IV thiamine: Required few doses of p.o. Seroquel 9/10.  LP could not be completed due to need for sedation.  Plan for 9/12 9/11: Started trial of scheduled Seroquel 25 mg in the morning and 50 mg at bedtime to help with sleep and delirium (discontinued 9/13)  9/12: Follow up LP results.     9/13: Clinically worse: Will get stat EEG, may consider scheduled Ativan after discussion with psychiatry if EEG is negative.  9/14: Some improvement.  Continue lorazepam 2 mg every 8 hours, tapered to 1 mg every 8 tomorrow    LBP: Age-indeterminate fractures at T7, T8, T9, and T12   -MRI from 09/09 reviewed.  Appreciate neurosurgery review \"Thoracic fractures likely chronic in nature clinically. Recommend patient follow up with PCP in [place] if ongoing or worsening symptoms.    No bracing necessary. \"     Recent COVID-19 infection  Patient developed a cough, fever, and sneezes about 1 week prior to admission.  Wife subsequently developed the same symptoms.  COVID-19 PCR was positive on 9/5/2024.   not hypoxic.    Acute metabolic acidosis  Suspect due to hypovolemia: Given IV fluid in the ED.  Resolved after IV fluids.     Thrombocytopenia, acute- resolved   Mild, platelets 137 initially. Had been normal on 6/17/24.  Could be due to acute illness.  Platelets down to 118 on 9/5     Hyperlipidemia  Continue PTA atorvastatin.     Tobacco use  Has been on Wellbutrin for a while (patient and wife not sure exactly how long) for tobacco cessation.  They state he stopped smoking about 2 weeks ago.  stopped Wellbutrin as it " can lower the seizure threshold.     History of prostate cancer  Status post prostactectomy.   Noted.        Diet: Regular Diet Adult  Snacks/Supplements Adult: Other; B: vanilla [place] Breakfast (RD); With Meals    DVT Prophylaxis: Pneumatic Compression Devices  Kennedy Catheter: Not present  Lines: None     Cardiac Monitoring: None  Code Status: Full Code      Clinically Significant Risk Factors                                # Financial/Environmental Concerns: none             Disposition Plan     Medically Ready for Discharge: Anticipated Tomorrow         Estiven Randall MD  Hospitalist Service  [facility]  Securely message with [employer])  Text page via [employer] Paging/Directory   The above note was dictated using voice recognition software. Although reviewed after completion, some word and grammatical error may remain . Please contact the author for any clarifications.  ______________________________________________________________________    Interval History   Better compared to yesterday  Awake.  Knows his name, date of birth, place  Trying to respond appropriately  Physical Exam   Vital Signs: Temp: 97.6  F (36.4  C) Temp src: Oral BP: 134/84 Pulse: 78   Resp: 18 SpO2: 99 % O2 Device: None (Room air)    Weight: 160 lbs 0 oz    CVS- I+II+ no m/r/g  RS- no tachypnea  Abdo- soft, no tenderness .   Ext- no edema   CNS-Knows his name, date of birth, place  Trying to respond appropriately    Medical Decision Making       51 MINUTES SPENT BY ME on the date of service doing chart review, history, exam, documentation & further activities per the note.  Discussion with patient, neurology, psychiatry    Data   ------------------------- PAST 24 HR DATA REVIEWED -----------------------------------------------    I have personally reviewed the following data over the past 24 hrs:    9.4  \   14.0   / 224     141 104 28.1 (H) /  156 (H)   3.9 27 0.88 \       Imaging results reviewed over the  past 24 hrs:   Recent Results (from the past 24 hour(s))   XR Chest Port 1 View    Narrative    CHEST PORTABLE ONE VIEW   9/13/2024 3:10 PM     HISTORY: Rule out aspiration.    COMPARISON: None.      Impression    IMPRESSION: No acute cardiopulmonary disease.     STACEY SOOD MD         SYSTEM ID:  [id number]

## 2024-09-14 NOTE — PROGRESS NOTES
Patient here for seizure and metabolic encephalopathy, NURIS orientation, doesn't follow commands. VSS on RA sbp<180. Incontinent. Diminished lung sounds. No sign of discomfort. PIV SL Regular diet thin liquid pills crushed. On covid precaution. Sitter at bedside.

## 2024-09-15 PROCEDURE — 99232 SBSQ HOSP IP/OBS MODERATE 35: CPT | Performed by: INTERNAL MEDICINE

## 2024-09-15 PROCEDURE — 120N000001 HC R&B MED SURG/OB

## 2024-09-15 PROCEDURE — 250N000011 HC RX IP 250 OP 636: Performed by: PSYCHIATRY & NEUROLOGY

## 2024-09-15 PROCEDURE — 250N000013 HC RX MED GY IP 250 OP 250 PS 637: Performed by: PSYCHIATRY & NEUROLOGY

## 2024-09-15 PROCEDURE — 999N000127 HC STATISTIC PERIPHERAL IV START W US GUIDANCE

## 2024-09-15 PROCEDURE — 250N000013 HC RX MED GY IP 250 OP 250 PS 637: Performed by: INTERNAL MEDICINE

## 2024-09-15 PROCEDURE — 250N000011 HC RX IP 250 OP 636: Performed by: INTERNAL MEDICINE

## 2024-09-15 RX ORDER — DIVALPROEX SODIUM 500 MG/1
500 TABLET, EXTENDED RELEASE ORAL 2 TIMES DAILY
Status: DISCONTINUED | OUTPATIENT
Start: 2024-09-15 | End: 2024-09-18 | Stop reason: HOSPADM

## 2024-09-15 RX ADMIN — LIDOCAINE 2 PATCH: 560 PATCH PERCUTANEOUS; TOPICAL; TRANSDERMAL at 08:50

## 2024-09-15 RX ADMIN — Medication 1 TABLET: at 08:50

## 2024-09-15 RX ADMIN — LORAZEPAM 1 MG: 2 INJECTION INTRAMUSCULAR; INTRAVENOUS at 15:51

## 2024-09-15 RX ADMIN — Medication 5 MG: at 21:08

## 2024-09-15 RX ADMIN — ATORVASTATIN CALCIUM 80 MG: 80 TABLET, FILM COATED ORAL at 08:50

## 2024-09-15 RX ADMIN — LORAZEPAM 2 MG: 2 INJECTION INTRAMUSCULAR; INTRAVENOUS at 03:04

## 2024-09-15 RX ADMIN — LEVETIRACETAM 1000 MG: 500 TABLET, FILM COATED ORAL at 08:50

## 2024-09-15 RX ADMIN — LORAZEPAM 1 MG: 2 INJECTION INTRAMUSCULAR; INTRAVENOUS at 21:08

## 2024-09-15 RX ADMIN — FOLIC ACID 1 MG: 1 TABLET ORAL at 08:50

## 2024-09-15 RX ADMIN — ENOXAPARIN SODIUM 40 MG: 40 INJECTION SUBCUTANEOUS at 08:50

## 2024-09-15 RX ADMIN — LORAZEPAM 1 MG: 2 INJECTION INTRAMUSCULAR; INTRAVENOUS at 08:49

## 2024-09-15 RX ADMIN — DIVALPROEX SODIUM 500 MG: 500 TABLET, FILM COATED, EXTENDED RELEASE ORAL at 21:08

## 2024-09-15 ASSESSMENT — ACTIVITIES OF DAILY LIVING (ADL)
ADLS_ACUITY_SCORE: 31
ADLS_ACUITY_SCORE: 41
ADLS_ACUITY_SCORE: 35
ADLS_ACUITY_SCORE: 33
ADLS_ACUITY_SCORE: 31
ADLS_ACUITY_SCORE: 30
ADLS_ACUITY_SCORE: 41
ADLS_ACUITY_SCORE: 35
ADLS_ACUITY_SCORE: 31
ADLS_ACUITY_SCORE: 41
ADLS_ACUITY_SCORE: 31
ADLS_ACUITY_SCORE: 31
ADLS_ACUITY_SCORE: 33
ADLS_ACUITY_SCORE: 41
ADLS_ACUITY_SCORE: 30
ADLS_ACUITY_SCORE: 30
ADLS_ACUITY_SCORE: 33
ADLS_ACUITY_SCORE: 34
ADLS_ACUITY_SCORE: 33
ADLS_ACUITY_SCORE: 41

## 2024-09-15 NOTE — PROGRESS NOTES
Reason for Admission: Suspected epilepsy  Acute metabolic encephalopathy  Cognitive/Mentation: A/Ox to self only  Neuros/CMS: NURIS neuro, but move extremities spontaneously   VS: VSS SBP<180.   Tele: n/a.  GI/: BS clear, incontinent.  Pulmonary: LS diminished in the lower lobe.  Pain: 0 on Flacc scale.   Drains/Lines: PIV SL  Skin: Intact  Activity: Assist x 1 with GBW.  Diet: regular with thin liquids.   Therapies recs: pending  Discharge: pending  Aggression Stoplight Tool: green   End of shift summary: Covid precaution maintained, sitter at bedside. Meds given through IV.

## 2024-09-15 NOTE — PLAN OF CARE
Reason for Admission: seizure, metabolic encephalopathy     Cognitive/Mentation: A&Ox 1-2. Oriented to self and sometimes year  Neuros/CMS: Intact ex NURIS most neuros. Pt moves all 4 extremities spontaneously  VS: stable on RA. SBP <180  /GI: Incontinent.  Pulmonary: LS diminished LL.  Pain: denies pain     Drains/Lines:   Skin: WNL  Activity: assist of 1 with GBW  Diet: regular with thin liquids. Takes pills crushed in applesauce.      Discharge: pending     Aggression Stoplight Tool: green     End of shift summary: Covid precautions maintained. Sitter at bedside for pulling lines and impulsiveness. Scheduled ativan given. Pt seemed more alert and oriented this shift.

## 2024-09-15 NOTE — PROGRESS NOTES
[facility]    Medicine Progress Note - Hospitalist Service    Date of Admission:  9/4/2024    Assessment & Plan   Ivan Webb is a markedly pleasant 69 year old gentleman with past medical history that is most notable for prior seizure, who presents with acute confusion and convulsive syncope. and is found to have suspected acute seizure and acute metabolic encephalopathy.     Suspected epilepsy  Acute metabolic encephalopathy  Mr. Webb was travelling back to his home in [place] on the day of admission. He recently quit smoking several weeks ago. It seems that he was hospitalized there in 2020 for acute confusion and bizarre behavior. It was documented that he was drinking 4-6 alcoholic beverages per day at that time. While hospitalized he had a seizure. Alcohol withdrawal and/or wernicke korsakoff syndrome were suspected. No further seizures have been known to have occurred before or since.  Now, he presented for evaluation of acute confusion, culminating in a [place] the airport. In the ED, he was afebrile, without hypotension, tachycardia, or hypoxia. WBC was normal, as was hepatic panel. He had mild acute metabolic acidosis and thrombocytopenia as discussed below. UA as well as UDS were negative and Ethyl alcohol level was undetectable. CT and CTA of head and neck showed no acute processes.   Overall, his symptoms seemed consistent with a seizure. He could have alcohol withdrawal or a post-viral process.  Registered to observation initially, then admitted to inpatient on 9/6/24.  Neurology consulted, appreciate their assistance.  EEG on 9/5/24 showed PLEDS, resolved around 7:00 pm on 9/5/24.  MRI brain on 9/5/24 showed no acute abnormality.  Loaded with keppra on 9/5/24, then started on keppra 750 mg PO BID.  Seizure precautions.  TSH and vitamin B12 within normal limits.  Ammonia level low at 13.  Multivitamin, folic acid, and  "thiamine.  Monitor for signs of withdrawal, although suspect this is unlikely as patient's alcohol use has diminished significantly since 2020, now has one drink per day.  Discontinued Wellbutrin.  Discussed with neurology 09/08.  Continues with mild encephalopathy on EEG.  Continue with Keppra.    9/9: More confused.  Appreciate neurology evaluation.  Continue to treat toxic metabolic causes.  Started trial of IV thiamine: Required few doses of p.o. Seroquel 9/10.  LP could not be completed due to need for sedation.  Plan for 9/12 9/11: Started trial of scheduled Seroquel 25 mg in the morning and 50 mg at bedtime to help with sleep and delirium (discontinued 9/13)  9/12: LP : Negative for enterovirus, HSV, varicella.  Glucose CSF of 68, total nucleated cells 2 .  Reviewed by neurology.  Paraneoplastic panel---p    9/13: Clinically worse: stat EEG negative for seizure, concern for catatonia and started trial of IV Ativan  9/14: Some improvement.  Continue lorazepam 2 mg every 8 hours, tapered to 1 mg every 8  9/15: Continue with the lorazepam.  Discussed with neurology and changed Keppra to Depakote 500 mg twice daily.    LBP: Age-indeterminate fractures at T7, T8, T9, and T12   -MRI from 09/09 reviewed.  Appreciate neurosurgery review \"Thoracic fractures likely chronic in nature clinically. Recommend patient follow up with PCP in [place] if ongoing or worsening symptoms.    No bracing necessary. \"     Recent COVID-19 infection  Patient developed a cough, fever, and sneezes about 1 week prior to admission.  Wife subsequently developed the same symptoms.  COVID-19 PCR was positive on 9/5/2024.   not hypoxic.    Acute metabolic acidosis  Suspect due to hypovolemia: Given IV fluid in the ED.  Resolved after IV fluids.     Thrombocytopenia, acute- resolved   Mild, platelets 137 initially. Had been normal on 6/17/24.  Could be due to acute illness.  Platelets down to 118 on 9/5     Hyperlipidemia  Continue PTA " atorvastatin.     Tobacco use  Has been on Wellbutrin for a while (patient and wife not sure exactly how long) for tobacco cessation.  They state he stopped smoking about 2 weeks ago.  stopped Wellbutrin as it can lower the seizure threshold.     History of prostate cancer  Status post prostactectomy.   Noted.        Diet: Regular Diet Adult  Snacks/Supplements Adult: Other; B: vanilla [place] Breakfast (RD); With Meals    DVT Prophylaxis: Pneumatic Compression Devices  Kennedy Catheter: Not present  Lines: None     Cardiac Monitoring: None  Code Status: Full Code      Clinically Significant Risk Factors                                # Financial/Environmental Concerns: none             Disposition Plan     Medically Ready for Discharge: Anticipated Tomorrow         Estiven Randall MD  Hospitalist Service  [facility]  Securely message with [employer] (more info)  Text page via [employer] Paging/Directory   The above note was dictated using voice recognition software. Although reviewed after completion, some word and grammatical error may remain . Please contact the author for any clarifications.  ______________________________________________________________________    Interval History   Stable compared to yesterday.  Remains calm  Prior to respond appropriately but still intermittent confusion  Physical Exam   Vital Signs: Temp: 97  F (36.1  C) Temp src: Axillary BP: 116/74 Pulse: 84   Resp: 16 SpO2: 99 % O2 Device: None (Room air)    Weight: 160 lbs 0 oz    CVS- I+II+ no m/r/g  RS- no tachypnea  Abdo- soft, no tenderness .   Ext- no edema   CNS-Knows his name, date of birth, place  Trying to respond appropriately    Medical Decision Making       51 MINUTES SPENT BY ME on the date of service doing chart review, history, exam, documentation & further activities per the note.  Discussion with patient, neurology    Data   ------------------------- PAST 24 HR DATA REVIEWED  -----------------------------------------------        Imaging results reviewed over the past 24 hrs:   No results found for this or any previous visit (from the past 24 hour(s)).

## 2024-09-15 NOTE — PLAN OF CARE
Reason for Admission: seizures, encephalopathy    Cognitive/Mentation: A/Ox 3. Disoriented to situation. Waxes and wanes throughout shift  Neuros/CMS: Intact ex orientation and mentation waxes throughout shift, illogical speech, participates in neuro exam once at end of shift.  VS: stable on RA.   /GI: Continent.   Pulmonary: LS clear.  Pain: denies.     Drains/Lines: R PIV SL  Skin: WNL  Activity: Assist x 1 with GBW.  Diet: regular with thin liquids. Takes pills crushed in applesauce.     Discharge: pending    Aggression Stoplight Tool: green    End of shift summary: sitter maintained at beside for impulsiveness, Covid precautions maintained. Patient seemed more improved through end of shift. Participated in neuro exam.

## 2024-09-16 ENCOUNTER — APPOINTMENT (OUTPATIENT)
Dept: OCCUPATIONAL THERAPY | Facility: CLINIC | Age: 69
DRG: 100 | End: 2024-09-16
Attending: INTERNAL MEDICINE
Payer: MEDICARE

## 2024-09-16 LAB
AMPHIPHYSIN IGG SER QL IA: NEGATIVE
ANNOTATION COMMENT IMP: NORMAL
CREAT SERPL-MCNC: 0.83 MG/DL (ref 0.67–1.17)
CV2 AB SERPL QL IF: NEGATIVE
EGFRCR SERPLBLD CKD-EPI 2021: >90 ML/MIN/1.73M2
GLIAL NUC TYPE 1 AB SER QL IF: NEGATIVE
HU1 AB SER QL: NEGATIVE
HU2 AB SER QL IF: NEGATIVE
HU3 AB SER QL: NEGATIVE
PARANEOPLASTIC AB SER-IMP: NORMAL
PCA-1 AB SER QL IF: NEGATIVE
PCA-2 AB SER QL IF: NEGATIVE
PCA-TR AB SER QL IF: NEGATIVE
PLATELET # BLD AUTO: 204 10E3/UL (ref 150–450)
VGCC-P/Q BIND IGG+IGM SER IA-SCNC: 0 NMOL/L
VGKC IGG+IGM SER IA-SCNC: 0 NMOL/L

## 2024-09-16 PROCEDURE — 99233 SBSQ HOSP IP/OBS HIGH 50: CPT | Performed by: PSYCHIATRY & NEUROLOGY

## 2024-09-16 PROCEDURE — 250N000013 HC RX MED GY IP 250 OP 250 PS 637: Performed by: PSYCHIATRY & NEUROLOGY

## 2024-09-16 PROCEDURE — 250N000011 HC RX IP 250 OP 636: Performed by: INTERNAL MEDICINE

## 2024-09-16 PROCEDURE — 97530 THERAPEUTIC ACTIVITIES: CPT | Mod: GO

## 2024-09-16 PROCEDURE — 85049 AUTOMATED PLATELET COUNT: CPT | Performed by: INTERNAL MEDICINE

## 2024-09-16 PROCEDURE — 36415 COLL VENOUS BLD VENIPUNCTURE: CPT | Performed by: INTERNAL MEDICINE

## 2024-09-16 PROCEDURE — 99232 SBSQ HOSP IP/OBS MODERATE 35: CPT | Performed by: INTERNAL MEDICINE

## 2024-09-16 PROCEDURE — 250N000013 HC RX MED GY IP 250 OP 250 PS 637: Performed by: INTERNAL MEDICINE

## 2024-09-16 PROCEDURE — 250N000011 HC RX IP 250 OP 636: Performed by: PSYCHIATRY & NEUROLOGY

## 2024-09-16 PROCEDURE — 97535 SELF CARE MNGMENT TRAINING: CPT | Mod: GO

## 2024-09-16 PROCEDURE — 120N000001 HC R&B MED SURG/OB

## 2024-09-16 PROCEDURE — 82565 ASSAY OF CREATININE: CPT | Performed by: INTERNAL MEDICINE

## 2024-09-16 RX ORDER — LORAZEPAM 1 MG/1
1 TABLET ORAL 3 TIMES DAILY
Status: COMPLETED | OUTPATIENT
Start: 2024-09-16 | End: 2024-09-16

## 2024-09-16 RX ORDER — LORAZEPAM 1 MG/1
1 TABLET ORAL 3 TIMES DAILY
Status: DISCONTINUED | OUTPATIENT
Start: 2024-09-16 | End: 2024-09-16

## 2024-09-16 RX ORDER — LORAZEPAM 1 MG/1
1 TABLET ORAL DAILY PRN
Status: DISCONTINUED | OUTPATIENT
Start: 2024-09-17 | End: 2024-09-18 | Stop reason: HOSPADM

## 2024-09-16 RX ORDER — LORAZEPAM 1 MG/1
1 TABLET ORAL 2 TIMES DAILY
Status: DISCONTINUED | OUTPATIENT
Start: 2024-09-17 | End: 2024-09-18 | Stop reason: HOSPADM

## 2024-09-16 RX ADMIN — LIDOCAINE 2 PATCH: 560 PATCH PERCUTANEOUS; TOPICAL; TRANSDERMAL at 09:49

## 2024-09-16 RX ADMIN — THIAMINE HCL TAB 100 MG 100 MG: 100 TAB at 08:57

## 2024-09-16 RX ADMIN — LORAZEPAM 1 MG: 1 TABLET ORAL at 14:12

## 2024-09-16 RX ADMIN — Medication 5 MG: at 21:33

## 2024-09-16 RX ADMIN — ENOXAPARIN SODIUM 40 MG: 40 INJECTION SUBCUTANEOUS at 09:02

## 2024-09-16 RX ADMIN — LORAZEPAM 1 MG: 2 INJECTION INTRAMUSCULAR; INTRAVENOUS at 08:49

## 2024-09-16 RX ADMIN — FOLIC ACID 1 MG: 1 TABLET ORAL at 08:58

## 2024-09-16 RX ADMIN — DIVALPROEX SODIUM 500 MG: 500 TABLET, FILM COATED, EXTENDED RELEASE ORAL at 08:54

## 2024-09-16 RX ADMIN — LORAZEPAM 1 MG: 1 TABLET ORAL at 21:33

## 2024-09-16 RX ADMIN — ATORVASTATIN CALCIUM 80 MG: 80 TABLET, FILM COATED ORAL at 08:55

## 2024-09-16 RX ADMIN — DIVALPROEX SODIUM 500 MG: 500 TABLET, FILM COATED, EXTENDED RELEASE ORAL at 21:33

## 2024-09-16 RX ADMIN — Medication 1 TABLET: at 08:58

## 2024-09-16 ASSESSMENT — ACTIVITIES OF DAILY LIVING (ADL)
ADLS_ACUITY_SCORE: 35
ADLS_ACUITY_SCORE: 38
ADLS_ACUITY_SCORE: 30
ADLS_ACUITY_SCORE: 38
ADLS_ACUITY_SCORE: 30
ADLS_ACUITY_SCORE: 38
ADLS_ACUITY_SCORE: 30
ADLS_ACUITY_SCORE: 38
ADLS_ACUITY_SCORE: 35
ADLS_ACUITY_SCORE: 30
ADLS_ACUITY_SCORE: 35
ADLS_ACUITY_SCORE: 35
ADLS_ACUITY_SCORE: 30
ADLS_ACUITY_SCORE: 35
ADLS_ACUITY_SCORE: 38
ADLS_ACUITY_SCORE: 30
ADLS_ACUITY_SCORE: 35
ADLS_ACUITY_SCORE: 34
ADLS_ACUITY_SCORE: 38
ADLS_ACUITY_SCORE: 30
ADLS_ACUITY_SCORE: 35
ADLS_ACUITY_SCORE: 35
ADLS_ACUITY_SCORE: 30

## 2024-09-16 NOTE — CONSULTS
Patient Name: Ivan Webb   MRN: [id number]   Admit Date: 9/4/2024    Current Infection Status: COVID-19   Current Isolation Status: Special Precautions     Review of COVID-19 status and required isolation precautions    Refer to Special Precautions Duration and Period of Transmissibility Guideline, in Policy Tech.        Involved parties: Hunter Musa, Infection Prevention and Other RN .    Criteria used to make decision: Lab Dates: 9/5/2024 .    The involved parties have reviewed this patient's chart per the Special Precautions Duration and Period of Transmissibility guideline and have taken the following action:     DECISION - OPTION 1: Patient meets all the criteria for Special Precautions isolation discontinuation and this writer will resolve the COVID-19 infection flag and isolation status, due to: Immunocompetent Asymptomatic: 10 days from positive test AND no COVID-19 symptom development.       Hunter Musa, Infection Prevention

## 2024-09-16 NOTE — PLAN OF CARE
Reason for Admission: Encephalopathy     Cognitive/Mentation: A/Ox 2, disoriented to situation and time  Neuros/CMS: Intact ex generalized weakness. Able to follow all commands this shift. Slow/deliberate with complex commands  VS: VSS. SBP < 180.   Tele: NA.  /GI: WNL. Continent. Last BM 9/15.   Pulmonary: LS diminished, but clear throughout. Denies SOB.  Pain: Denies.     Drains/Lines: PIV SL  Skin: Blanchable redness to coccyx.  Activity: Assist x 1 with GB.  Diet: Regular with thin liquids. Takes pills whole with water, one at a time.     Therapies recs: home with assist  Discharge: pending mentation     Aggression Stoplight Tool: Green    End of shift summary: Psych saw patient today and changed IV Ativan orders to po. Plan to likely decrease to BID tomorrow. Patient's mentation appears to be improving. Able to state his birthday, name, place, president's name and following all commands appropriately. Wife at bedside this afternoon. Special precautions discontinued by Infection Prevention; considered Covid recovered. Sitter to be discontinued at 1500.

## 2024-09-16 NOTE — CONSULTS
"[facility]     CONSULT PSYCHIATRY  FOLLOW UP NOTE     DATE OF SERVICE   09/16/2024       IDENTIFICATION   Ivan Webb  Age: 69 year old  MRN# [id number]   YOB: 1955  Length of Stay:  10        CHIEF COMPLAINT   \"I was thinking of things needing to do when we get home.\"       CONSULT REQUEST BY   Kelly Laguerre re:  catatonia       SUBJECTIVE   The patient's care was discussed with primary treatment team directly and patient's electronic chart notes were reviewed.      Staff report patient did not report any acute medical concerns or side effects.  Cleared from COVID-19 precautions.  No behavioral issues overnight, including violent or aggressive behaviors. Patient did not require seclusion or restraints. Patient is not exhibiting signs or symptoms of psychosis or antonio. Patient did not endorse suicidal ideation. Patient did not endorse homicide ideation.     Patient is medication adherent.  Patient is sleeping well. Patient is eating adequately. Patient continues with bedside attending.    Attending Interval Report (9/16):  Much better today.  Ambulated on the floor.  Mentation improving.  No further episodes of agitation    C/L Psychiatry initial treatment plan (9/13):  Medication Recommendations:   Outpatient Psychiatric medications were continued with the exception of Wellbutrin XL.  Ativan:  Trial Ativan 2 mg IV x 2 dose today, then 3 dose tomorrow for agitated catatonia in setting fo MDD, recurrent, with psychosis, then 1 mg IV tid.  Will follow up on 9/16 to review efficacy/tolerability.  May convert to oral when able.  Melatonin:  Continue Melatonin 5 mg at bedtime .  Zyprexa ODT/IM:  PRN Zyprexa ODT 5 mg tid/Zyprexa 5-10 mg IM TID, agitation/aggression.  Clonidine:  Consider Clonidine 0.1 mg daily to tid for acute stabilization, agitation/impulsivity/BP elevation.  Seroquel: Discontinue Seroquel trial due to limited response.  Keppra:  Hospital loading and " scheduled dosing for seizure management.  Possible association with worsening of mood and irritability.  Consider switch to Depakote ER or similar if able.    Review of notes and/or information:  I personally reviewed notes from the patient's electronic chart since initial psychiatry consult dated 9/13, most recent visit, and other interim reports. This provided me with information regarding patient's recent clinical course.     I personally reviewed the patient's chart, including available medication list and progression of hospital course.       OBJECTIVE   Upon interview, the patient is cooperative on approach.  Visit performed in patient's room on the unit.  Consent is to evaluate.  Patient is voluntary.    Suicidal ideation: denies current or recent suicidal ideation or behaviors.    Homicidal ideation: denies current or recent homicidal ideation or behaviors.    Psychotic symptoms:  Patient denies AH, VH, paranoia, delusions.     Medication adherent to Ativan and change from Keppra to Depakote.  No medication side effects reported.  Efficacy observed and reported with Ativan trial to target symptoms of catatonia.  Patient describes of his feeling calmer.  Able to describe stressors at home, which he feels triggered episode.  Cognition is clear.  Remains on one-to-one to assist with issues of ambulation.  Accepting of treatment plan to continue taper of Ativan and transitioned to as needed dosing prior to travels back home to [place].    Acute medical concerns: none    Other issues reported by patient:  Patient had no further questions or concerns.         MEDICATIONS   Medications:  Scheduled Meds:  Current Facility-Administered Medications   Medication Dose Route Frequency Provider Last Rate Last Admin     atorvastatin (LIPITOR) tablet 80 mg  80 mg Oral Daily Estiven Randall MD   80 mg at 09/16/24 0855     [Held by provider] buPROPion (WELLBUTRIN XL) 24 hr tablet 150 mg  150 mg Oral Estiven Fu  MD         divalproex sodium extended-release (DEPAKOTE ER) 24 hr tablet 500 mg  500 mg Oral BID Estiven Randall MD   500 mg at 09/16/24 0854     enoxaparin ANTICOAGULANT (LOVENOX) injection 40 mg  40 mg Subcutaneous Q24H Estiven Randall MD   40 mg at 09/16/24 0902     folic acid (FOLVITE) tablet 1 mg  1 mg Oral Daily Estiven Randall MD   1 mg at 09/16/24 0858     Lidocaine (LIDOCARE) 4 % Patch 2 patch  2 patch Transdermal Q24H Kerline Fraire MD   2 patch at 09/16/24 0949     LORazepam (ATIVAN) tablet 1 mg  1 mg Oral TID Freddy Kong MD         melatonin tablet 5 mg  5 mg Oral At Bedtime Estiven Randall MD   5 mg at 09/15/24 2108     multivitamin w/minerals (THERA-VIT-M) tablet 1 tablet  1 tablet Oral Daily Estiven Randall MD   1 tablet at 09/16/24 0858     [Held by provider] QUEtiapine (SEROquel) half-tab 12.5 mg  12.5 mg Oral BID Estiven Randall MD         thiamine (B-1) tablet 100 mg  100 mg Oral Daily Estiven Randall MD   100 mg at 09/16/24 0857     Continuous Infusions:  Current Facility-Administered Medications   Medication Dose Route Frequency Provider Last Rate Last Admin     PRN Meds:.  Current Facility-Administered Medications   Medication Dose Route Frequency Provider Last Rate Last Admin     acetaminophen (TYLENOL) tablet 650 mg  650 mg Oral Q4H PRN Nikolay Gore MD   650 mg at 09/11/24 0351    Or     acetaminophen (TYLENOL) Suppository 650 mg  650 mg Rectal Q4H PRN Nikolay Gore MD         benzocaine-menthol (CHLORASEPTIC) 6-10 MG lozenge 1 lozenge  1 lozenge Buccal Q1H PRN Nikolay Gore MD   1 lozenge at 09/05/24 0316     guaiFENesin-dextromethorphan (ROBITUSSIN DM) 100-10 MG/5ML syrup 10 mL  10 mL Oral Q4H PRN Estiven Randall MD   10 mL at 09/11/24 1335     LORazepam (ATIVAN) injection 0.5 mg  0.5 mg Intravenous Once PRN Estiven Randall MD         LORazepam (ATIVAN) injection 2 mg  2 mg Intravenous Q3 Min PRN Nikolay Gore MD   2 mg at 09/09/24 2156      "melatonin tablet 3 mg  3 mg Oral At Bedtime PRN Nikolay Gore MD   3 mg at 09/10/24 2121     OLANZapine zydis (zyPREXA) ODT tab 5 mg  5 mg Oral TID PRN Freddy Kong MD        Or     OLANZapine (zyPREXA) injection 10 mg  10 mg Intramuscular TID PRN Freddy Kong MD         ondansetron (ZOFRAN ODT) ODT tab 4 mg  4 mg Oral Q6H PRN Nikolay Gore MD        Or     ondansetron (ZOFRAN) injection 4 mg  4 mg Intravenous Q6H PRN Nikolay Gore MD         senna-docusate (SENOKOT-S/PERICOLACE) 8.6-50 MG per tablet 1 tablet  1 tablet Oral BID PRN Nikolay Gore MD   1 tablet at 09/08/24 1305    Or     senna-docusate (SENOKOT-S/PERICOLACE) 8.6-50 MG per tablet 2 tablet  2 tablet Oral BID PRN Nikolay Gore MD   2 tablet at 09/09/24 1337     Medication adherence issues: MS Med Adherence Y/N: No  Medication side effects: MEDICATION SIDE EFFECTS: no side effects reported  Benefit: Yes / No: Yes       ROS   The 10 point Review of Systems is negative other than noted in the HPI or here.       MENTAL STATUS EXAM   Vitals: /70 (BP Location: Left arm)   Pulse 69   Temp 98.1  F (36.7  C) (Oral)   Resp 17   Ht 1.727 m (5' 8\")   Wt 72.6 kg (160 lb)   SpO2 96%   BMI 24.33 kg/m    Weight:   160 lbs 0 oz    Body mass index is 24.33 kg/m .  Vitals:    09/04/24 2100   Weight: 72.6 kg (160 lb)     Appearance: Cooperative  Mood:  {Mood: \"Better\"  Affect: blunted  was congruent to speech  Suicidal Ideation: PRESENT / ABSENT: absent   Homicidal Ideation: PRESENT / ABSENT: absent   Thought process: response delay   Thought content: denies suicidal ideation, violent ideation, and psychosis .   Fund of Knowledge: Average  Attention/Concentration: Fair  Language ability:  Intact  Memory: Sufficient  Insight:  fair.  Judgement: fair  Orientation: Yes, x4  Psychomotor Behavior: slowed    Muscle Strength and Tone: MuscleStrength: Normal  Gait and Station: Normal       LABS   personally " "reviewed.   Temp: 98.1  F (36.7  C) Temp src: Oral BP: 129/70 Pulse: 69   Resp: 17 SpO2: 96 % O2 Device: None (Room air)   Height: 172.7 cm (5' 8\") Weight: 72.6 kg (160 lb)  Estimated body mass index is 24.33 kg/m  as calculated from the following:    Height as of this encounter: 1.727 m (5' 8\").    Weight as of this encounter: 72.6 kg (160 lb).    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  On 9/17, decrease frequency Ativan 1 mg p.o. twice daily with as needed Ativan 1 mg anxiety.  May continue taper on 9/18 to Ativan 0.5 mg bid with PRN Ativan 1 mg, anxiety and follow up with outpatient provider in [place].  Depakote ER: Continue Depakote  mg twice daily to replace Keppra due  due to possible association with agitation.  Wellbutrin XL: PTA medication discontinued.  Seroquel: Hospital medication trial discontinued.  Continue Medications:  As needed melatonin, sleep  As needed Zyprexa ODT/IM    Target psychiatric symptoms and interventions:  Education:  Risks, benefits, and alternatives discussed at length with patient. "     Acute Medical Problems and Treatments:  .  Acute medical concerns:  No acute medical concerns.   Consults: Neurology    Safety/Structure/Supervision:  Continue precautions as noted.  Precautions:   Discontinue one-to-one as per primary team .   Please also refer to RN/team documentation for additional details.     Care Coordination:  Treatment plan discussed directly with staff.  Please reconsult Psychiatry as needed.         Risk Assessment: [facility] RISK ASSESSMENT: Patient able to contract for safety and Patient on precautions    Coordination of Care:   Treatment Plan reviewed, Care discussed with Care/Treatment Team Members, Chart reviewed and Patient seen         Freddy Kong MD    -     09/16/2024  -     1:31 PM    Total encounter time:  A total of  61  minutes spent related to chart review, history and exam, documentation   and further activities as noted above    This note was created with help of Dragon dictation system. Grammatical / typing errors are not intentional.        Freddy Kong MD  Consult/Liaison Psychiatry   [facility]

## 2024-09-16 NOTE — PROGRESS NOTES
Reason for Admission: Suspected epilepsy  Acute metabolic encephalopathy  Cognitive/Mentation: A/Ox to self only orientation waxes and wanes.   Neuros/CMS: NURIS neuro, speech illogical but move extremities spontaneously.   VS: VSS SBP<180.   Tele: n/a.  GI/: BS clear, incontinent.  Pulmonary: LS diminished in the lower lobe.  Pain: 0 on Flacc scale.   Drains/Lines: PIV SL  Skin: Intact  Activity: Assist x 1 with GBW.  Diet: regular with thin liquids takes pills whole with water  Therapies recs: pending  Discharge: pending  Aggression Stoplight Tool: green   End of shift summary: Covid precaution maintained, sitter at bedside for impulsiveness

## 2024-09-16 NOTE — PROGRESS NOTES
[facility]    Medicine Progress Note - Hospitalist Service    Date of Admission:  9/4/2024    Assessment & Plan   Ivan Webb is a markedly pleasant 69 year old gentleman with past medical history that is most notable for prior seizure, who presents with acute confusion and convulsive syncope. and is found to have suspected acute seizure and acute metabolic encephalopathy.     Suspected epilepsy  Acute metabolic encephalopathy  Mr. Webb was travelling back to his home in [place] on the day of admission. He recently quit smoking several weeks ago. It seems that he was hospitalized there in 2020 for acute confusion and bizarre behavior. It was documented that he was drinking 4-6 alcoholic beverages per day at that time. While hospitalized he had a seizure. Alcohol withdrawal and/or wernicke korsakoff syndrome were suspected. No further seizures have been known to have occurred before or since.  Now, he presented for evaluation of acute confusion, culminating [place] the airport. In the ED, he was afebrile, without hypotension, tachycardia, or hypoxia. WBC was normal, as was hepatic panel. He had mild acute metabolic acidosis and thrombocytopenia as discussed below. UA as well as UDS were negative and Ethyl alcohol level was undetectable. CT and CTA of head and neck showed no acute processes.   Overall, his symptoms seemed consistent with a seizure. He could have alcohol withdrawal or a post-viral process.  Registered to observation initially, then admitted to inpatient on 9/6/24.  Neurology consulted, appreciate their assistance.  EEG on 9/5/24 showed PLEDS, resolved around 7:00 pm on 9/5/24.  MRI brain on 9/5/24 showed no acute abnormality.  Loaded with keppra on 9/5/24, then started on keppra 750 mg PO BID.  Seizure precautions.  TSH and vitamin B12 within normal limits.  Ammonia level low at 13.  Multivitamin, folic acid, and  "thiamine.  Monitor for signs of withdrawal, although suspect this is unlikely as patient's alcohol use has diminished significantly since 2020, now has one drink per day.  Discontinued Wellbutrin.  Discussed with neurology 09/08.  Continues with mild encephalopathy on EEG.  Continue with Keppra.    9/9: More confused.  Appreciate neurology evaluation.  Continue to treat toxic metabolic causes.  Started trial of IV thiamine: Required few doses of p.o. Seroquel 9/10.  LP could not be completed due to need for sedation.  Plan for 9/12 9/11: Started trial of scheduled Seroquel 25 mg in the morning and 50 mg at bedtime to help with sleep and delirium (discontinued 9/13)  9/12: LP : Negative for enterovirus, HSV, varicella.  Glucose CSF of 68, total nucleated cells 2 .  Reviewed by neurology.  Paraneoplastic panel---p    9/13: Clinically worse: stat EEG negative for seizure, concern for catatonia and started trial of IV Ativan  9/14: Some improvement.  Continue lorazepam 2 mg every 8 hours, tapered to 1 mg every 8  9/15: Continue with the lorazepam.  Discussed with neurology and changed Keppra to Depakote 500 mg twice daily.  9/16: Continued improvement.  Change IV lorazepam to p.o. and continue Depakote.    LBP: Age-indeterminate fractures at T7, T8, T9, and T12   -MRI from 09/09 reviewed.  Appreciate neurosurgery review \"Thoracic fractures likely chronic in nature clinically. Recommend patient follow up with PCP in [place] if ongoing or worsening symptoms.    No bracing necessary. \"     Recent COVID-19 infection  Patient developed a cough, fever, and sneezes about 1 week prior to admission.  Wife subsequently developed the same symptoms.  COVID-19 PCR was positive on 9/5/2024.   not hypoxic.    Acute metabolic acidosis  Suspect due to hypovolemia: Given IV fluid in the ED.  Resolved after IV fluids.     Thrombocytopenia, acute- resolved   Mild, platelets 137 initially. Had been normal on 6/17/24.  Could be due to " acute illness.  Platelets down to 118 on 9/5     Hyperlipidemia  Continue PTA atorvastatin.     Tobacco use  Has been on Wellbutrin for a while (patient and wife not sure exactly how long) for tobacco cessation.  They state he stopped smoking about 2 weeks ago.  stopped Wellbutrin as it can lower the seizure threshold.     History of prostate cancer  Status post prostactectomy.   Noted.        Diet: Regular Diet Adult  Snacks/Supplements Adult: Other; B: vanilla Wellington Breakfast (RD); With Meals    DVT Prophylaxis: Pneumatic Compression Devices  Kennedy Catheter: Not present  Lines: None     Cardiac Monitoring: None  Code Status: Full Code      Clinically Significant Risk Factors                                # Financial/Environmental Concerns: none             Disposition Plan     Medically Ready for Discharge: Anticipated Tomorrow if mentation continues to improve         Estiven Randall MD  Hospitalist Service  [facility]  Securely message with [employer] (more info)  Text page via [facility] Paging/Directory   The above note was dictated using voice recognition software. Although reviewed after completion, some word and grammatical error may remain . Please contact the author for any clarifications.  ______________________________________________________________________    Interval History   Much better today.  Ambulated on the floor.  Mentation improving.  No further episodes of agitation  Physical Exam   Vital Signs: Temp: 98.2  F (36.8  C) Temp src: Axillary BP: 127/81 Pulse: 70   Resp: 17 SpO2: 100 % O2 Device: None (Room air)    Weight: 160 lbs 0 oz    CVS- I+II+ no m/r/g  RS- CTAB  Abdo- soft, no tenderness .   Ext- no edema   CNS-Knows his name, date of birth, place  Can count 10-1 backwards    Medical Decision Making       51 MINUTES SPENT BY ME on the date of service doing chart review, history, exam, documentation & further activities per the note.  Discussion with patient, patient's  wife, psychiatry    Data   ------------------------- PAST 24 HR DATA REVIEWED -----------------------------------------------    I have personally reviewed the following data over the past 24 hrs:    N/A  \   N/A   / 204     N/A N/A N/A /  N/A   N/A N/A 0.83 \       Imaging results reviewed over the past 24 hrs:   No results found for this or any previous visit (from the past 24 hour(s)).

## 2024-09-16 NOTE — PLAN OF CARE
"/78 (BP Location: Right arm)   Pulse 68   Temp 98.5  F (36.9  C) (Oral)   Resp 17   Ht 1.727 m (5' 8\")   Wt 72.6 kg (160 lb)   SpO2 99%   BMI 24.33 kg/m      Patient name: Ivan Webb    Summary: Patient here with seizure. Doing well now, no longer needing a sit. Disoriented to situation. Moving wilth a1 gaitbelt. Went for one walk.     Reyes Ochoa, RN  [facility] [address] [facility]  [phone]    "

## 2024-09-17 ENCOUNTER — APPOINTMENT (OUTPATIENT)
Dept: OCCUPATIONAL THERAPY | Facility: CLINIC | Age: 69
DRG: 100 | End: 2024-09-17
Attending: INTERNAL MEDICINE
Payer: MEDICARE

## 2024-09-17 LAB
BACTERIA CSF CULT: NO GROWTH
GLUCOSE BLDC GLUCOMTR-MCNC: 92 MG/DL (ref 70–99)
GRAM STAIN RESULT: NORMAL
GRAM STAIN RESULT: NORMAL

## 2024-09-17 PROCEDURE — 250N000013 HC RX MED GY IP 250 OP 250 PS 637: Performed by: INTERNAL MEDICINE

## 2024-09-17 PROCEDURE — 99233 SBSQ HOSP IP/OBS HIGH 50: CPT | Performed by: PSYCHIATRY & NEUROLOGY

## 2024-09-17 PROCEDURE — 250N000011 HC RX IP 250 OP 636: Performed by: INTERNAL MEDICINE

## 2024-09-17 PROCEDURE — 120N000001 HC R&B MED SURG/OB

## 2024-09-17 PROCEDURE — 99232 SBSQ HOSP IP/OBS MODERATE 35: CPT | Performed by: INTERNAL MEDICINE

## 2024-09-17 PROCEDURE — 97530 THERAPEUTIC ACTIVITIES: CPT | Mod: GO

## 2024-09-17 PROCEDURE — 250N000013 HC RX MED GY IP 250 OP 250 PS 637: Performed by: HOSPITALIST

## 2024-09-17 PROCEDURE — 250N000013 HC RX MED GY IP 250 OP 250 PS 637: Performed by: PSYCHIATRY & NEUROLOGY

## 2024-09-17 RX ORDER — LORAZEPAM 1 MG/1
1 TABLET ORAL 2 TIMES DAILY
Qty: 28 TABLET | Refills: 0 | Status: SHIPPED | OUTPATIENT
Start: 2024-09-17 | End: 2024-10-01

## 2024-09-17 RX ORDER — LANOLIN ALCOHOL/MO/W.PET/CERES
100 CREAM (GRAM) TOPICAL DAILY
Qty: 30 TABLET | Refills: 1 | Status: SHIPPED | OUTPATIENT
Start: 2024-09-18

## 2024-09-17 RX ORDER — LANOLIN ALCOHOL/MO/W.PET/CERES
100 CREAM (GRAM) TOPICAL DAILY
Qty: 30 TABLET | Refills: 1 | Status: SHIPPED | OUTPATIENT
Start: 2024-09-17

## 2024-09-17 RX ORDER — DIVALPROEX SODIUM 500 MG/1
500 TABLET, EXTENDED RELEASE ORAL 2 TIMES DAILY
Qty: 60 TABLET | Refills: 1 | Status: SHIPPED | OUTPATIENT
Start: 2024-09-17

## 2024-09-17 RX ORDER — FOLIC ACID 1 MG/1
1 TABLET ORAL DAILY
Qty: 30 TABLET | Refills: 1 | Status: SHIPPED | OUTPATIENT
Start: 2024-09-17

## 2024-09-17 RX ORDER — FOLIC ACID/MV,IRON,MIN/LUTEIN 0.4-18-25
1 TABLET ORAL DAILY
Qty: 30 TABLET | Refills: 1 | Status: SHIPPED | OUTPATIENT
Start: 2024-09-17

## 2024-09-17 RX ORDER — LIDOCAINE 4 G/G
2 PATCH TOPICAL EVERY 24 HOURS
Qty: 20 PATCH | Refills: 1 | Status: SHIPPED | OUTPATIENT
Start: 2024-09-17

## 2024-09-17 RX ORDER — LORAZEPAM 1 MG/1
1 TABLET ORAL DAILY PRN
Qty: 10 TABLET | Refills: 0 | Status: SHIPPED | OUTPATIENT
Start: 2024-09-17 | End: 2024-10-01

## 2024-09-17 RX ADMIN — ACETAMINOPHEN 650 MG: 325 TABLET ORAL at 18:21

## 2024-09-17 RX ADMIN — LORAZEPAM 1 MG: 1 TABLET ORAL at 09:09

## 2024-09-17 RX ADMIN — DIVALPROEX SODIUM 500 MG: 500 TABLET, FILM COATED, EXTENDED RELEASE ORAL at 20:12

## 2024-09-17 RX ADMIN — Medication 5 MG: at 22:33

## 2024-09-17 RX ADMIN — ATORVASTATIN CALCIUM 80 MG: 80 TABLET, FILM COATED ORAL at 09:09

## 2024-09-17 RX ADMIN — FOLIC ACID 1 MG: 1 TABLET ORAL at 09:07

## 2024-09-17 RX ADMIN — LIDOCAINE 2 PATCH: 560 PATCH PERCUTANEOUS; TOPICAL; TRANSDERMAL at 09:10

## 2024-09-17 RX ADMIN — LORAZEPAM 1 MG: 1 TABLET ORAL at 20:12

## 2024-09-17 RX ADMIN — DIVALPROEX SODIUM 500 MG: 500 TABLET, FILM COATED, EXTENDED RELEASE ORAL at 09:09

## 2024-09-17 RX ADMIN — ENOXAPARIN SODIUM 40 MG: 40 INJECTION SUBCUTANEOUS at 09:09

## 2024-09-17 RX ADMIN — THIAMINE HCL TAB 100 MG 100 MG: 100 TAB at 09:09

## 2024-09-17 RX ADMIN — Medication 1 TABLET: at 09:07

## 2024-09-17 ASSESSMENT — ACTIVITIES OF DAILY LIVING (ADL)
ADLS_ACUITY_SCORE: 30
ADLS_ACUITY_SCORE: 31
ADLS_ACUITY_SCORE: 31
ADLS_ACUITY_SCORE: 30
ADLS_ACUITY_SCORE: 35

## 2024-09-17 NOTE — PLAN OF CARE
Goal Outcome Evaluation:  Reason for Admission: Suspected epilepsy  Acute metabolic encephalopathy  Cognitive/Mentation: A/Ox 3 disoriented to situation.    Neuros/CMS: Intact ex generalized weakness. Able to follow all commands.   VS: VSS   Tele: n/a.  GI/: BS clear, continent of urine  Pulmonary: LS diminished in the lower lobe.  Pain: . Denies   Drains/Lines: PIV SL  Skin: Intact except  redness to coccyx  Activity: Assist x 1 with GBW.  Diet: regular with thin liquids takes pills whole with water  Therapies recs: Home with assist  Discharge: pending  Aggression Stoplight Tool: green   End of shift summary:  Calm and cooperative, Up to BR wit assist of 1. Mentation improving. Has scheduled ativan BID

## 2024-09-17 NOTE — PROGRESS NOTES
[facility]    Medicine Progress Note - Hospitalist Service    Date of Admission:  9/4/2024    Assessment & Plan   Ivan Webb is a markedly pleasant 69 year old gentleman with past medical history that is most notable for prior seizure, who presents with acute confusion and convulsive syncope. and is found to have suspected acute seizure and acute metabolic encephalopathy.     Suspected epilepsy  Acute metabolic encephalopathy  Mr. Webb was travelling back to his home in [place] on the day of admission. He recently quit smoking several weeks ago. It seems that he was hospitalized there in 2020 for acute confusion and bizarre behavior. It was documented that he was drinking 4-6 alcoholic beverages per day at that time. While hospitalized he had a seizure. Alcohol withdrawal and/or wernicke korsakoff syndrome were suspected. No further seizures have been known to have occurred before or since.  Now, he presented for evaluation of acute confusion, culminating in a [place] the airport. In the ED, he was afebrile, without hypotension, tachycardia, or hypoxia. WBC was normal, as was hepatic panel. He had mild acute metabolic acidosis and thrombocytopenia as discussed below. UA as well as UDS were negative and Ethyl alcohol level was undetectable. CT and CTA of head and neck showed no acute processes.   Overall, his symptoms seemed consistent with a seizure. He could have alcohol withdrawal or a post-viral process.  Registered to observation initially, then admitted to inpatient on 9/6/24.  Neurology consulted, appreciate their assistance.  EEG on 9/5/24 showed PLEDS, resolved around 7:00 pm on 9/5/24.  MRI brain on 9/5/24 showed no acute abnormality.  Loaded with keppra on 9/5/24, then started on keppra 750 mg PO BID.  Seizure precautions.  TSH and vitamin B12 within normal limits.  Ammonia level low at 13.  Multivitamin, folic acid, and  "thiamine.  Monitor for signs of withdrawal, although suspect this is unlikely as patient's alcohol use has diminished significantly since 2020, now has one drink per day.  Discontinued Wellbutrin.  Discussed with neurology 09/08.  Continues with mild encephalopathy on EEG.  Continue with Keppra.    9/9: More confused.  Appreciate neurology evaluation.  Continue to treat toxic metabolic causes.  Started trial of IV thiamine: Required few doses of p.o. Seroquel 9/10.  LP could not be completed due to need for sedation.  Plan for 9/12 9/11: Started trial of scheduled Seroquel 25 mg in the morning and 50 mg at bedtime to help with sleep and delirium (discontinued 9/13)  9/12: LP : Negative for enterovirus, HSV, varicella.  Glucose CSF of 68, total nucleated cells 2 .  Reviewed by neurology.  Paraneoplastic panel---p    9/13: Clinically worse: stat EEG negative for seizure, concern for catatonia and started trial of IV Ativan  9/14: Some improvement.  Continue lorazepam 2 mg every 8 hours, tapered to 1 mg every 8  9/15: Continue with the lorazepam.  Discussed with neurology and changed Keppra to Depakote 500 mg twice daily.  9/16: Continued improvement.  Change IV lorazepam to p.o. and continue Depakote.  9/17: Discussed in detail with patient, wife and psychiatrist.  Will wean lorazepam to 1 mg twice daily and additional 1 mg as needed dosing.  If he remains stable next 24 hours, likely discharge tomorrow on current dosing with close follow-up with his PMD/psychiatry in [place]    LBP: Age-indeterminate fractures at T7, T8, T9, and T12   -MRI from 09/09 reviewed.  Appreciate neurosurgery review \"Thoracic fractures likely chronic in nature clinically. Recommend patient follow up with PCP in [place] if ongoing or worsening symptoms.    No bracing necessary. \"     Recent COVID-19 infection  Patient developed a cough, fever, and sneezes about 1 week prior to admission.  Wife subsequently developed the same " symptoms.  COVID-19 PCR was positive on 9/5/2024.   not hypoxic.    Acute metabolic acidosis  Suspect due to hypovolemia: Given IV fluid in the ED.  Resolved after IV fluids.     Thrombocytopenia, acute- resolved   Mild, platelets 137 initially. Had been normal on 6/17/24.  Could be due to acute illness.  Platelets down to 118 on 9/5     Hyperlipidemia  Continue PTA atorvastatin.     Tobacco use  Has been on Wellbutrin for a while (patient and wife not sure exactly how long) for tobacco cessation.  They state he stopped smoking about 2 weeks ago.  stopped Wellbutrin as it can lower the seizure threshold.     History of prostate cancer  Status post prostactectomy.   Noted.        Diet: Regular Diet Adult  Snacks/Supplements Adult: Other; B: vanilla [place] Breakfast (RD); With Meals    DVT Prophylaxis: Pneumatic Compression Devices  Kennedy Catheter: Not present  Lines: None     Cardiac Monitoring: None  Code Status: Full Code      Clinically Significant Risk Factors                                # Financial/Environmental Concerns: none             Disposition Plan     Medically Ready for Discharge: Anticipated Tomorrow if mentation continues to improve         Estiven Randall MD  Hospitalist Service  [facility]  Securely message with [employer] (more info)  Text page via [facility] Paging/Directory   The above note was dictated using voice recognition software. Although reviewed after completion, some word and grammatical error may remain . Please contact the author for any clarifications.  ______________________________________________________________________    Interval History   Continues to improve.  No further agitation  Much more agitated  Physical Exam   Vital Signs: Temp: 97.8  F (36.6  C) Temp src: Oral BP: (!) 143/76 Pulse: 77   Resp: 16 SpO2: 97 % O2 Device: None (Room air)    Weight: 160 lbs 0 oz    CVS- I+II+ no m/r/g  RS- CTAB  Abdo- soft, no tenderness .   Ext- no edema   CNS-Knows  his name, date of birth, place    Medical Decision Making       51 MINUTES SPENT BY ME on the date of service doing chart review, history, exam, documentation & further activities per the note.  Discussion with patient, patient's wife, psychiatry    Data   ------------------------- PAST 24 HR DATA REVIEWED -----------------------------------------------        Imaging results reviewed over the past 24 hrs:   No results found for this or any previous visit (from the past 24 hour(s)).

## 2024-09-17 NOTE — PROGRESS NOTES
"[facility]     CONSULT PSYCHIATRY  FOLLOW UP NOTE     DATE OF SERVICE   09/17/2024       IDENTIFICATION   Ivan Webb  Age: 69 year old  MRN# [id number]   YOB: 1955  Length of Stay:  11        CHIEF COMPLAINT   \"Better.\"       SUBJECTIVE   The patient's care was discussed with primary treatment team directly and patient's electronic chart notes were reviewed.      Staff report patient did not report any acute medical concerns or side effects.  Staff describe patient is doing well and no longer needing a sitter.  Described as calm and cooperative.  Mentation is improving with Ativan twice daily.  No behavioral issues overnight, including violent or aggressive behaviors. Patient did not require seclusion or restraints. Patient is not exhibiting signs or symptoms of psychosis or antonio. Patient did not endorse suicidal ideation. Patient did not endorse homicide ideation.     Patient is medication adherent. Patient is attending therapies to include OT assessment.  Patient is sleeping well. Patient is eating adequately.     Attending Interval Report (9/17):  Continues to improve. No further agitation     Care coordination performed with hospitalist before, during and after patient visit.    C/L Psychiatry initial treatment plan (9/13):  Medication Changes:    Ativan:  Change Ativan 1 mg IV to oral tid.  On 9/17, decrease frequency Ativan 1 mg p.o. twice daily with as needed Ativan 1 mg anxiety.  May continue taper on 9/18 to Ativan 0.5 mg bid with PRN Ativan 1 mg, anxiety and follow up with outpatient provider in [place].  Depakote ER: Continue Depakote  mg twice daily to replace Keppra due to possible association with agitation.  Wellbutrin XL: PTA medication discontinued.  Seroquel: Hospital medication trial discontinued.  Continue Medications:  As needed melatonin, sleep  As needed Zyprexa ODT/IM    Review of notes and/or information:  I personally reviewed notes from " the patient's electronic chart since initial psychiatry consult dated 9/13, most recent visit, and other interim reports. This provided me with information regarding patient's recent clinical course.     I personally reviewed the patient's chart, including available medication list and progression of hospital course.       OBJECTIVE   Upon interview, the patient is cooperative on approach.  Visit performed in patient's room on the unit with wife, OT, hospitalist.  Consent is given to evaluate.  Patient is voluntary.    Suicidal ideation: denies current or recent suicidal ideation or behaviors.    Homicidal ideation: denies current or recent homicidal ideation or behaviors.    Psychotic symptoms:  Patient denies AH, VH, paranoia, delusions.     Medication adherent to lorazepam and Depakote ER.  No medication side effects reported.  Reports improvement of mood, clear thoughts and no behaviors.  Denies anxiety.  Discussed medication management to include plan to continue Ativan 1 mg twice daily with as needed dose for backup.  Recommendation to reestablish with PCP upon return to [place].  Potential side effects discussed in detail of lorazepam to include issues of f motor impairment, falls and cognition.  Patient is aware of concerns and accepting of disposition plan needing coordination or safe transportation back to [place].    Acute medical concerns: none.  OT discussed needs for supports.    Other issues reported by patient:  Patient and wife had no further questions or concerns.         MEDICATIONS   Medications:  Scheduled Meds:  Current Facility-Administered Medications   Medication Dose Route Frequency Provider Last Rate Last Admin    atorvastatin (LIPITOR) tablet 80 mg  80 mg Oral Daily Estiven Randall MD   80 mg at 09/17/24 0909    divalproex sodium extended-release (DEPAKOTE ER) 24 hr tablet 500 mg  500 mg Oral BID Estiven Randall MD   500 mg at 09/17/24 0909    enoxaparin ANTICOAGULANT (LOVENOX) injection 40 mg  40  mg Subcutaneous Q24H Estiven Randall MD   40 mg at 09/17/24 0909    folic acid (FOLVITE) tablet 1 mg  1 mg Oral Daily Estiven Randall MD   1 mg at 09/17/24 0907    Lidocaine (LIDOCARE) 4 % Patch 2 patch  2 patch Transdermal Q24H Kerline Fraire MD   2 patch at 09/17/24 0910    LORazepam (ATIVAN) tablet 1 mg  1 mg Oral BID Freddy Kong MD   1 mg at 09/17/24 0909    melatonin tablet 5 mg  5 mg Oral At Bedtime Estiven Randall MD   5 mg at 09/16/24 2133    multivitamin w/minerals (THERA-VIT-M) tablet 1 tablet  1 tablet Oral Daily Estiven Randall MD   1 tablet at 09/17/24 0907    thiamine (B-1) tablet 100 mg  100 mg Oral Daily Estiven Randall MD   100 mg at 09/17/24 0909     Continuous Infusions:  Current Facility-Administered Medications   Medication Dose Route Frequency Provider Last Rate Last Admin     PRN Meds:.  Current Facility-Administered Medications   Medication Dose Route Frequency Provider Last Rate Last Admin    acetaminophen (TYLENOL) tablet 650 mg  650 mg Oral Q4H PRN Nikolay Gore MD   650 mg at 09/11/24 0351    Or    acetaminophen (TYLENOL) Suppository 650 mg  650 mg Rectal Q4H PRN Nikolay Gore MD        benzocaine-menthol (CHLORASEPTIC) 6-10 MG lozenge 1 lozenge  1 lozenge Buccal Q1H PRN Nikolay Gore MD   1 lozenge at 09/05/24 0316    guaiFENesin-dextromethorphan (ROBITUSSIN DM) 100-10 MG/5ML syrup 10 mL  10 mL Oral Q4H PRN Estiven Randall MD   10 mL at 09/11/24 1335    LORazepam (ATIVAN) injection 0.5 mg  0.5 mg Intravenous Once PRN Estiven Randall MD        LORazepam (ATIVAN) injection 2 mg  2 mg Intravenous Q3 Min PRN Nikolay Gore MD   2 mg at 09/09/24 1822    LORazepam (ATIVAN) tablet 1 mg  1 mg Oral Daily PRN Freddy Kong MD        melatonin tablet 3 mg  3 mg Oral At Bedtime PRN Nikolay Gore MD   3 mg at 09/10/24 2121    OLANZapine zydis (zyPREXA) ODT tab 5 mg  5 mg Oral TID PRN Freddy Kong MD        Or    OLANZapine (zyPREXA)  "injection 10 mg  10 mg Intramuscular TID PRN Freddy Kong MD        ondansetron (ZOFRAN ODT) ODT tab 4 mg  4 mg Oral Q6H PRN Nikolay Gore MD        Or    ondansetron (ZOFRAN) injection 4 mg  4 mg Intravenous Q6H PRN Nikolay Gore MD        senna-docusate (SENOKOT-S/PERICOLACE) 8.6-50 MG per tablet 1 tablet  1 tablet Oral BID PRN Nikolay Gore MD   1 tablet at 09/08/24 1305    Or    senna-docusate (SENOKOT-S/PERICOLACE) 8.6-50 MG per tablet 2 tablet  2 tablet Oral BID PRN Nikolay Gore MD   2 tablet at 09/09/24 1337     Medication adherence issues: MS Med Adherence Y/N: No  Medication side effects: MEDICATION SIDE EFFECTS: no side effects reported  Benefit: Yes / No: Yes       ROS   The 10 point Review of Systems is negative other than noted in the HPI or here.       MENTAL STATUS EXAM   Vitals: /72 (BP Location: Left arm)   Pulse 74   Temp 97.6  F (36.4  C) (Oral)   Resp 16   Ht 1.727 m (5' 8\")   Wt 72.6 kg (160 lb)   SpO2 99%   BMI 24.33 kg/m    Weight:   160 lbs 0 oz    Body mass index is 24.33 kg/m .  Vitals:    09/04/24 2100   Weight: 72.6 kg (160 lb)     Appearance: Cooperative  Mood:  {Mood: \"Better\"  Affect: blunted  was congruent to speech  Suicidal Ideation: PRESENT / ABSENT: absent   Homicidal Ideation: PRESENT / ABSENT: absent   Thought process: response delay   Thought content: denies suicidal ideation, violent ideation, and psychosis .   Fund of Knowledge: Average  Attention/Concentration: Fair  Language ability:  Intact  Memory: Sufficient  Insight:  fair.  Judgement: fair  Orientation: Yes, x4  Psychomotor Behavior: slowed    Muscle Strength and Tone: MuscleStrength: Normal  Gait and Station: Normal       LABS   personally reviewed.   Temp: 97.6  F (36.4  C) Temp src: Oral BP: 130/72 Pulse: 74   Resp: 16 SpO2: 99 % O2 Device: None (Room air)   Height: 172.7 cm (5' 8\") Weight: 72.6 kg (160 lb)  Estimated body mass index is 24.33 kg/m  as " "calculated from the following:    Height as of this encounter: 1.727 m (5' 8\").    Weight as of this encounter: 72.6 kg (160 lb).    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 twice daily with as needed Ativan 1 mg anxiety. Plan to continue schedule until follow up with home provider.  Side effects discussed/reviewed in detail.  Continue Medications:  Depakote ER: Depakote  mg twice daily to replace Keppra due to possible association with agitated behavior.  Wellbutrin XL: PTA medication discontinued.  Seroquel: Hospital medication trial discontinued.  As needed melatonin, sleep  As needed Zyprexa ODT/IM    Target psychiatric symptoms and interventions:  Education:  Risks, benefits, and alternatives discussed at length with patient and wife.     Acute Medical Problems and Treatments:  .  Acute medical concerns:  No acute medical concerns.   Consults: OT    Safety/Structure/Supervision:  Continue precautions " as noted.  Precautions:   Discussed medication and travel precautions .   Please also refer to RN/team documentation for additional details.     Care Coordination:  Treatment plan discussed directly with staff and wife.  Please reconsult Psychiatry as needed.         Risk Assessment: [facility] RISK ASSESSMENT: Patient able to contract for safety and Patient on precautions    Coordination of Care:   Treatment Plan reviewed, Care discussed with Care/Treatment Team Members, Chart reviewed and Patient seen         Freddy Kong MD    -     09/17/2024  -     11:07 AM    Total encounter time:  A total of  58  minutes spent related to chart review, history and exam, documentation   and further activities as noted above    This note was created with help of Dragon dictation system. Grammatical / typing errors are not intentional.        Freddy Kong MD  Consult/Liaison Psychiatry   [facility]

## 2024-09-17 NOTE — PLAN OF CARE
Goal Outcome Evaluation:       Reason for Admission: Seizure, encephalopathy    Cognitive/Mentation: A/Ox 2, disoriented to to time and situation.  Reorients.  Neuros/CMS: Intact ex Forgetful, improving confusion.   VS: WDL RA.   Tele: NA.  /GI: Continent. Last BM today.   Pulmonary: LS clear to auscultation.  Pain: Denies.     Drains/Lines: PIV saline locked.  Skin: Scattered bruising, otherwise intact.  Activity: Assist x 1 with GB.  Diet: regular with thin liquids. Takes pills whole.     Therapies recs: Home with 24 hr care.  Discharge: Likely tomorrow    Aggression Stoplight Tool: green    End of shift summary: Calm and cooperative.  Improving confusion.  Ambulating with spouse in hallway in preparation for discharging and flying home with spouse tomorrow.    Addendum: Pt's spouse purchased tickets for a flight back to their home in [place] for tomorrow at 1 pm. MD notified with recommendation to order discharge medications so they would be ready in the morning.

## 2024-09-18 ENCOUNTER — APPOINTMENT (OUTPATIENT)
Dept: OCCUPATIONAL THERAPY | Facility: CLINIC | Age: 69
DRG: 100 | End: 2024-09-18
Attending: INTERNAL MEDICINE
Payer: MEDICARE

## 2024-09-18 VITALS
RESPIRATION RATE: 18 BRPM | TEMPERATURE: 97.8 F | DIASTOLIC BLOOD PRESSURE: 76 MMHG | WEIGHT: 160 LBS | HEART RATE: 71 BPM | BODY MASS INDEX: 24.25 KG/M2 | HEIGHT: 68 IN | SYSTOLIC BLOOD PRESSURE: 135 MMHG | OXYGEN SATURATION: 98 %

## 2024-09-18 LAB — MAYO MISC RESULT: NORMAL

## 2024-09-18 PROCEDURE — 250N000013 HC RX MED GY IP 250 OP 250 PS 637: Performed by: INTERNAL MEDICINE

## 2024-09-18 PROCEDURE — 97530 THERAPEUTIC ACTIVITIES: CPT | Mod: GO

## 2024-09-18 PROCEDURE — 97535 SELF CARE MNGMENT TRAINING: CPT | Mod: GO

## 2024-09-18 PROCEDURE — 99239 HOSP IP/OBS DSCHRG MGMT >30: CPT | Performed by: INTERNAL MEDICINE

## 2024-09-18 PROCEDURE — 250N000013 HC RX MED GY IP 250 OP 250 PS 637: Performed by: PSYCHIATRY & NEUROLOGY

## 2024-09-18 PROCEDURE — 250N000011 HC RX IP 250 OP 636: Performed by: INTERNAL MEDICINE

## 2024-09-18 RX ADMIN — Medication 1 TABLET: at 08:20

## 2024-09-18 RX ADMIN — ATORVASTATIN CALCIUM 80 MG: 80 TABLET, FILM COATED ORAL at 08:18

## 2024-09-18 RX ADMIN — ENOXAPARIN SODIUM 40 MG: 40 INJECTION SUBCUTANEOUS at 08:31

## 2024-09-18 RX ADMIN — FOLIC ACID 1 MG: 1 TABLET ORAL at 08:22

## 2024-09-18 RX ADMIN — DIVALPROEX SODIUM 500 MG: 500 TABLET, FILM COATED, EXTENDED RELEASE ORAL at 08:20

## 2024-09-18 RX ADMIN — LORAZEPAM 1 MG: 1 TABLET ORAL at 08:19

## 2024-09-18 RX ADMIN — THIAMINE HCL TAB 100 MG 100 MG: 100 TAB at 08:20

## 2024-09-18 RX ADMIN — LIDOCAINE 2 PATCH: 560 PATCH PERCUTANEOUS; TOPICAL; TRANSDERMAL at 08:23

## 2024-09-18 ASSESSMENT — ACTIVITIES OF DAILY LIVING (ADL)
ADLS_ACUITY_SCORE: 31
ADLS_ACUITY_SCORE: 30
ADLS_ACUITY_SCORE: 31
ADLS_ACUITY_SCORE: 31

## 2024-09-18 NOTE — DISCHARGE SUMMARY
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        Follow up with Psychiatry within 1-2 weeks    Follow up with Neurosurgery in 2-3 weeks            Unresulted Labs Ordered in the Past 30 Days of this Admission       No orders found from 8/5/2024 to 9/5/2024.            Discharge Disposition   Discharged to home  Condition at discharge: Stable    Hospital Course   Ivan Webb is a markedly pleasant 69 year old gentleman with past medical history that is most notable for prior seizure, who presents with acute confusion and convulsive syncope. and is found to have suspected acute seizure and acute metabolic encephalopathy.     Suspected epilepsy  Acute metabolic encephalopathy  Mr. Webb was travelling back to his home in [place] on the day of admission. He recently quit smoking several weeks ago. It seems that he was hospitalized there in 2020 for acute confusion and bizarre behavior. It was documented that he was drinking 4-6 alcoholic beverages per day at that time. While hospitalized he had a seizure. Alcohol withdrawal and/or wernicke korsakoff syndrome were suspected. No further seizures have been known to  have occurred before or since.  Now, he presented for evaluation of acute confusion, culminating [place]. In the ED, he was afebrile, without hypotension, tachycardia, or hypoxia. WBC was normal, as was hepatic panel. He had mild acute metabolic acidosis and thrombocytopenia as discussed below. UA as well as UDS were negative and Ethyl alcohol level was undetectable. CT and CTA of head and neck showed no acute processes.   Overall, his symptoms seemed consistent with a seizure. He could have alcohol withdrawal or a post-viral process.  Registered to observation initially, then admitted to inpatient on 9/6/24.  Neurology consulted, appreciate their assistance.  EEG on 9/5/24 showed PLEDS, resolved around 7:00 pm on 9/5/24.  MRI brain on 9/5/24 showed no acute abnormality.Ammonia level low at 13.  Loaded with keppra on 9/5/24, then started on keppra 750 mg PO BID.  TSH and vitamin B12 within normal limits.  Discontinued Wellbutrin.  Discussed with neurology 09/08.  Continues with mild encephalopathy on EEG.  Continue with Keppra.    9/9: More confused.  Appreciate neurology evaluation.  Continue to treat toxic metabolic causes.  Started trial of IV thiamine:   9/11: Started trial of scheduled Seroquel 25 mg in the morning and 50 mg at bedtime to help with sleep and delirium (discontinued 9/13)  9/12: LP : Negative for enterovirus, HSV, varicella.  Glucose CSF of 68, total nucleated cells 2 .  Reviewed by neurology.  Paraneoplastic panel---neg   9/13: Clinically worse: stat EEG negative for seizure, concern for catatonia and started trial of IV Ativan.  He improved significantly with trial of Ativan.  Ativan and was weaned from IV 2 mg 3 times daily to p.o. twice daily 1 mg with additional 1 mg as needed at the time of discharge.  He was advised close follow-up with PMD and psychiatry as outpatient     LBP: Age-indeterminate fractures at T7, T8, T9, and T12   -MRI  "from 09/09 reviewed.  Appreciate neurosurgery review \"Thoracic fractures likely chronic in nature clinically. Recommend patient follow up with PCP in [place] if ongoing or worsening symptoms.    No bracing necessary. \"      Recent COVID-19 infection  Patient developed a cough, fever, and sneezes about 1 week prior to admission.  Wife subsequently developed the same symptoms.  COVID-19 PCR was positive on 9/5/2024.   not hypoxic.     Acute metabolic acidosis  Suspect due to hypovolemia: Given IV fluid in the ED.  Resolved after IV fluids.     Thrombocytopenia, acute- resolved   Mild, platelets 137 initially. Had been normal on 6/17/24.  Could be due to acute illness.     Hyperlipidemia  Continue PTA atorvastatin.     Tobacco use  Has been on Wellbutrin for a while (patient and wife not sure exactly how long) for tobacco cessation.  They state he stopped smoking about 2 weeks ago.  stopped Wellbutrin as it can lower the seizure threshold.     History of prostate cancer  Status post prostactectomy.   Noted.       Consultations This Hospital Stay   CARE MANAGEMENT / SOCIAL WORK IP CONSULT  NEUROLOGY IP CONSULT  PHYSICAL THERAPY ADULT IP CONSULT  OCCUPATIONAL THERAPY ADULT IP CONSULT  NEUROSURGERY IP CONSULT  VASCULAR ACCESS ADULT IP CONSULT  NEUROLOGY IP CONSULT  PHARMACY IP CONSULT  PSYCHIATRY IP CONSULT  VASCULAR ACCESS ADULT IP CONSULT  PSYCHIATRY IP CONSULT  VASCULAR ACCESS ADULT IP CONSULT  VASCULAR ACCESS ADULT IP CONSULT  VASCULAR ACCESS ADULT IP CONSULT  INFECTION PREVENTION IP CONSULT  PSYCHIATRY IP CONSULT    Code Status   Full Code    Time Spent on this Encounter   I, Estiven Randall MD, personally saw the patient today and spent greater than 30 minutes discharging this patient.       Estiven Randall MD  [facility] NEUROSCIENCE [facility]  [address] [place] [address]  Phone: [phone]  ______________________________________________________________________    Physical Exam   Vital " Signs: Temp: 97.8  F (36.6  C) Temp src: Oral BP: 135/76 Pulse: 71   Resp: 18 SpO2: 98 % O2 Device: None (Room air)    Weight: 160 lbs 0 oz  CVS- I+II+ no m/r/g  RS- CTAB  Abdo- soft, no tenderness .   Ext- no edema   CNS-Knows his name, date of birth, place       Primary Care Physician   Physician No Ref-Primary    Discharge Orders      Occupational Therapy  Referral      Physical Therapy  Referral      Reason for your hospital stay    Ivan Webb is a markedly pleasant 69 year old gentleman with past medical history that is most notable for prior seizure, who presents with acute confusion and convulsive syncope. and is found to have suspected acute seizure and acute metabolic encephalopathy.     Suspected epilepsy  Acute metabolic encephalopathy     Follow-up and recommended labs and tests     Follow up with primary care provider, Physician No Ref-Primary, within 7 days for hospital follow- up.        Follow up with Psychiatry within 1-2 weeks     Activity    Your activity upon discharge: activity as tolerated     When to contact your care team    Call your primary doctor if you have any of the following: temperature greater than 100.4 or less than 96 or increasing confusion/ agitation .     Diet    Follow this diet upon discharge: Current Diet:Orders Placed This Encounter      Snacks/Supplements Adult: Other; B: vanilla [place] Breakfast (RD); With Meals      Regular Diet Adult       Significant Results and Procedures   Results for orders placed or performed during the hospital encounter of 09/04/24   CT Head w/o Contrast    Narrative    EXAM: CT HEAD W/O CONTRAST  LOCATION: [facility]  DATE: 9/4/2024    INDICATION: seizure, fall, confusion  COMPARISON: None.  TECHNIQUE: Routine CT Head without IV contrast. Multiplanar reformats. Dose reduction techniques were used.    FINDINGS:  INTRACRANIAL CONTENTS: No intracranial hemorrhage, extraaxial collection, or mass  effect.  No CT evidence of acute infarct. Mild presumed chronic small vessel ischemic changes. Mild generalized volume loss. No hydrocephalus.     VISUALIZED ORBITS/SINUSES/MASTOIDS: No intraorbital abnormality. Minimal amount of fluid left maxillary sinus. No middle ear or mastoid effusion.    BONES/SOFT TISSUES: No acute abnormality.      Impression    IMPRESSION:  1.  No acute intracranial process.   CTA Head Neck w Contrast    Narrative    EXAM: CTA HEAD NECK W CONTRAST  LOCATION: [facility]  DATE: 9/4/2024    INDICATION: seizure, fall, confusion  COMPARISON: None.  CONTRAST: 67mL Isovue 370  TECHNIQUE: Head and neck CT angiogram with IV contrast. Axial helical CT images of the head and neck vessels obtained during the arterial phase of intravenous contrast administration. Axial 2D reconstructed images and multiplanar 3D MIP reconstructed   images of the head and neck vessels were performed by the technologist. Dose reduction techniques were used. All stenosis measurements made according to NASCET criteria unless otherwise specified.    FINDINGS:   HEAD CTA:  ANTERIOR CIRCULATION: Mild atherosclerotic changes cavernous and supraclinoid ICAs bilaterally. Standard [place] anatomy.    POSTERIOR CIRCULATION: No stenosis/occlusion, aneurysm, or high flow vascular malformation. Dominant left and smaller right vertebral artery contribute to a normal basilar artery.     DURAL VENOUS SINUSES: Expected enhancement of the major dural venous sinuses.    NECK CTA:  RIGHT CAROTID: Less than 50% narrowing.    LEFT CAROTID: Less than 50% narrowing.    VERTEBRAL ARTERIES: Mild narrowing at the origin of both vertebral arteries. Both vertebral arteries appear otherwise normal. Dominant left and smaller right vertebral arteries.    AORTIC ARCH: Moderate narrowing at origin/proximal aspect of both subclavian arteries.    NONVASCULAR STRUCTURES: Mild compression of T3 and moderate compression of  T4, age indeterminate; appear chronic.      Impression    IMPRESSION:     HEAD CTA:   1.  Mild atherosclerotic changes cavernous and supraclinoid ICAs bilaterally.  2.  Intracranial circulation appears otherwise normal.    NECK CTA:  1.  No definite hemodynamically significant narrowing throughout major neck vessels.     MR Brain w/o Contrast    Narrative    EXAM: MR BRAIN W/O CONTRAST  LOCATION: [facility]  DATE: 9/5/2024    INDICATION: AMS, confusion, seizure.  COMPARISON: CTA head and neck 9/4/2024.  TECHNIQUE: Routine multiplanar multisequence head MRI without intravenous contrast.    FINDINGS:  INTRACRANIAL CONTENTS: No acute or subacute infarct. No mass, acute hemorrhage, or extra-axial fluid collections. Mild volume loss and presumed chronic small vessel ischemia. Normal position of the cerebellar tonsils.     SELLA: No abnormality accounting for technique.    OSSEOUS STRUCTURES/SOFT TISSUES: Normal marrow signal. The major intracranial vascular flow voids are maintained.     ORBITS: No abnormality accounting for technique.     SINUSES/MASTOIDS: No paranasal sinus mucosal disease. No middle ear or mastoid effusion.       Impression    IMPRESSION:  1.  No acute intracranial abnormality.    2.  Age-related and chronic ischemic changes.   XR Thoracic Lumbar Standing 2 Views    Narrative    EXAM: XR THORACIC LUMBAR STANDING 2 VIEWS  LOCATION: [facility]  DATE: 9/5/2024    INDICATION: LBP. r o fracture  COMPARISON: None.      Impression    IMPRESSION: Age-indeterminate fractures at T7, T8, T9, and T12. Normal alignment. Mild multilevel disc height loss and facet hypertrophy. Aortic atherosclerotic versus.    MR Thoracic Spine w/o Contrast    Narrative    EXAM: MR THORACIC SPINE W/O CONTRAST  LOCATION: [facility]  DATE: 9/9/2024    INDICATION: T7  T12 fractures  COMPARISON: Thoracic spine radiographs: 9/5/2024. Head/neck CTA:  9/4/2024.  TECHNIQUE: Routine Thoracic Spine MRI without IV contrast.    FINDINGS:     Incomplete examination, terminated early due to excessive patient motion. Sagittal STIR sequence was not obtained.    Superior endplate fractures at T3, T4, T5, T6, T7, T8, T9, and T12. Height loss is greatest and approximately 50% at T4. No definite marrow edema, though evaluation is limited in the absence of sagittal STIR sequence.    Mild multilevel degenerative disc disease without findings to suggest high-grade spinal canal or neural foraminal stenosis within constraints of motion degradation.    No definite cord signal abnormality within constraints of motion.    No definite extraspinal abnormality.      Impression    IMPRESSION:    1.  Incomplete examination, terminated early due to excessive patient motion. Sagittal STIR sequence was not obtained.  2.  Superior endplate fractures at T3, T4, T5, T6, T7, T8, T9 and T12. Height loss is greatest and approximately 50% at T4. No definite marrow edema at any levels, though evaluation is substantially limited in the absence of sagittal STIR sequence. If   there is persistent clinical concern, suggest repeat study with formal sedation.  3.  No evidence for high-grade spinal canal or neural foraminal stenosis.  4.  No definite cord signal abnormality.   XR Lumbar Puncture Spinal Tap Diag    Narrative    EXAM: XR LUMBAR PUNCTURE SPINAL TAP DIAGNOSTIC  9/12/2024 11:15 AM       History:  Confusion, Abnormal behavior.     PROCEDURE: The patient consented for a lumbar puncture. The benefits  and risks of the procedure were explained to the patient, including  but not limited to worsening headache, hemorrhage, infection, lower  extremity pain, or nerve root injury. The patient was sterilely  prepped and draped with the patient in the supine position, over the  lower back. Under fluoroscopic guidance, the interlaminar spaces were  noted. 1% lidocaine was administered for local anesthetic  over the  L4-5 interlaminar space, and a 20 gauge needle was advanced into the  thecal sac under fluoroscopic guidance.  There was initial aspiration  of clear CSF. About 10 cc's total were aspirated.  The needle was  removed. There were no immediate complications associated with the  procedure.   Estimated blood loss during the procedure was less than 5  mL.    Opening Pressure: ~27 cm of water  Fluoro time: 0.2 minutes  Images Obtained: 1  Medications used: 3 mL of 1% lidocaine.      Impression    IMPRESSION: Successful lumbar puncture. No sedation needed.    SUPA FRANKLIN PA-C         SYSTEM ID:  [id number]   XR Chest Port 1 View    Narrative    CHEST PORTABLE ONE VIEW   9/13/2024 3:10 PM     HISTORY: Rule out aspiration.    COMPARISON: None.      Impression    IMPRESSION: No acute cardiopulmonary disease.     STACEY SOOD MD         SYSTEM ID:  [id number]       Discharge Medications   Current Discharge Medication List        START taking these medications    Details   divalproex sodium extended-release (DEPAKOTE ER) 500 MG 24 hr tablet Take 1 tablet (500 mg) by mouth 2 times daily.  Qty: 60 tablet, Refills: 1    Associated Diagnoses: Seizure (H)      Lidocaine (LIDOCARE) 4 % Patch Place 2 patches over 12 hours onto the skin every 24 hours. To prevent lidocaine toxicity, patient should be patch free for 12 hrs daily.  Qty: 20 patch, Refills: 1    Associated Diagnoses: Neurocognitive disorder      !! LORazepam (ATIVAN) 1 MG tablet Take 1 tablet (1 mg) by mouth 2 times daily for 14 days.  Qty: 28 tablet, Refills: 0    Associated Diagnoses: Neurocognitive disorder      !! LORazepam (ATIVAN) 1 MG tablet Take 1 tablet (1 mg) by mouth daily as needed for anxiety.  Qty: 10 tablet, Refills: 0    Associated Diagnoses: Neurocognitive disorder      melatonin 5 MG tablet Take 1 tablet (5 mg) by mouth at bedtime.  Qty: 30 tablet, Refills: 0    Associated Diagnoses: Neurocognitive disorder       !! - Potential duplicate  medications found. Please discuss with provider.        CONTINUE these medications which have CHANGED    Details   folic acid (FOLVITE) 1 MG tablet Take 1 tablet (1 mg) by mouth daily.  Qty: 30 tablet, Refills: 1    Associated Diagnoses: Neurocognitive disorder      multivitamin w/minerals (CERTAVITE/ANTIOXIDANTS) tablet Take 1 tablet by mouth daily.  Qty: 30 tablet, Refills: 1    Associated Diagnoses: Neurocognitive disorder      !! thiamine (B-1) 100 MG tablet Take 1 tablet (100 mg) by mouth daily.  Qty: 30 tablet, Refills: 1    Associated Diagnoses: Neurocognitive disorder      !! thiamine (B-1) 100 MG tablet Take 1 tablet (100 mg) by mouth daily.  Qty: 30 tablet, Refills: 1    Associated Diagnoses: Neurocognitive disorder       !! - Potential duplicate medications found. Please discuss with provider.        CONTINUE these medications which have NOT CHANGED    Details   atorvastatin (LIPITOR) 80 MG tablet Take 80 mg by mouth daily.           STOP taking these medications       buPROPion (WELLBUTRIN XL) 150 MG 24 hr tablet Comments:   Reason for Stopping:             Allergies   No Known Allergies

## 2024-09-18 NOTE — PLAN OF CARE
"Reason for Admission: AMS, seizure    Cognitive/Mentation: A/Ox 4  Neuros/CMS: Intact ex gen weakness, forgetful  VS: BP (!) 154/79 (BP Location: Left arm)   Pulse 73   Temp 97.7  F (36.5  C) (Oral)   Resp 16   Ht 1.727 m (5' 8\")   Wt 72.6 kg (160 lb)   SpO2 95%   BMI 24.33 kg/m    .  /GI: Continent. .   Pulmonary: LS clear, RA.  Pain: denies.     Drains/Lines: saline locked  Skin: wnl  Activity: Assist x sba with gb.  Diet: reg with thin liquids. Takes pills whole.     Therapies recs: home with assist  Discharge: possible discharge tomorrow AM    Aggression Stoplight Tool: green        "

## 2024-09-18 NOTE — PLAN OF CARE
Goal Outcome Evaluation:      Plan of Care Reviewed With: patient    Overall Patient Progress: improvingOverall Patient Progress: improving         Pt here with AMS, seizure. A&O x4. Neuros stable. VSS on RA. Regular diet, thin liquids. Takes pills whole. Up with assist 1 x GB. Denies pain. Pt scoring green on the Aggression Stop Light Tool. Discharge back to [place] today.

## 2024-09-19 NOTE — PROGRESS NOTES
"Pt discharged to home with wife at 1045, Pt and wife verbalized understanding of discharge instructions and necessary follow up appointments. Sent with medications. Provided wife and Pt with disc that has imaging from this hospital stay.     /76 (BP Location: Left arm)   Pulse 71   Temp 97.8  F (36.6  C) (Oral)   Resp 18   Ht 1.727 m (5' 8\")   Wt 72.6 kg (160 lb)   SpO2 98%   BMI 24.33 kg/m    Pt is A&Ox4, able to make needs known. Forgetful. VSS on RA. Neuros intact. Ambulates SBA with gait belt. On Regular diet with thin liquids, took pills whole. Worked with therapy this morning. Discharged home with 24 hr care.       MD Notification    Notified Person: MD    Notified Person Name: Tonia VILLA    Notification Date/Time: September 18, 2024 at 1015    Notification Interaction: vocera message     Purpose of Notification:    please give me a call about this Pt, his ativan perscription is 1 mg two times daily for 14 days. Psychs note states to taper switch to 0.5mg taper bid starting today       Orders Received: Per MD does not want Pt to taper and to follow up outpatient    Comments: Wife and Pt aware     "

## 2024-09-19 NOTE — PLAN OF CARE
Occupational Therapy Discharge Summary    Reason for therapy discharge:    Discharged to home.    Progress towards therapy goal(s). See goals on Care Plan in [facility] health record for goal details.  Goals partially met.  Barriers to achieving goals:   discharge from facility.    Therapy recommendation(s):    Pt with improvement during hospital stay, nearing ADL baseline. Expect pt will have 24h support from wife and discharge back to [place]. Will need to follow up w/OP OT and PCP when pt returns home. Pt currently requiring direct 24 hr supervision, cues for sequencing selfcares, CGA-SBA for mobility, total assist for IADLs.

## 2024-10-06 ENCOUNTER — HEALTH MAINTENANCE LETTER (OUTPATIENT)
Age: 69
End: 2024-10-06

## (undated) RX ORDER — FENTANYL CITRATE 50 UG/ML
INJECTION, SOLUTION INTRAMUSCULAR; INTRAVENOUS
Status: DISPENSED
Start: 2024-09-12

## (undated) RX ORDER — FLUMAZENIL 0.1 MG/ML
INJECTION, SOLUTION INTRAVENOUS
Status: DISPENSED
Start: 2024-09-12

## (undated) RX ORDER — NALOXONE HYDROCHLORIDE 0.4 MG/ML
INJECTION, SOLUTION INTRAMUSCULAR; INTRAVENOUS; SUBCUTANEOUS
Status: DISPENSED
Start: 2024-09-12